# Patient Record
Sex: FEMALE | Race: OTHER | HISPANIC OR LATINO | Employment: OTHER | URBAN - METROPOLITAN AREA
[De-identification: names, ages, dates, MRNs, and addresses within clinical notes are randomized per-mention and may not be internally consistent; named-entity substitution may affect disease eponyms.]

---

## 2017-02-07 ENCOUNTER — HISTORICAL (OUTPATIENT)
Dept: OPHTHALMOLOGY | Facility: CLINIC | Age: 72
End: 2017-02-07

## 2017-02-10 ENCOUNTER — HISTORICAL (OUTPATIENT)
Dept: CARDIOLOGY | Facility: HOSPITAL | Age: 72
End: 2017-02-10

## 2017-03-08 ENCOUNTER — HISTORICAL (OUTPATIENT)
Dept: CARDIOLOGY | Facility: CLINIC | Age: 72
End: 2017-03-08

## 2017-03-28 ENCOUNTER — HISTORICAL (OUTPATIENT)
Dept: LAB | Facility: HOSPITAL | Age: 72
End: 2017-03-28

## 2017-03-29 ENCOUNTER — HOSPITAL ENCOUNTER (OUTPATIENT)
Dept: ADMINISTRATIVE | Facility: HOSPITAL | Age: 72
End: 2017-03-30
Attending: INTERNAL MEDICINE | Admitting: INTERNAL MEDICINE

## 2017-05-02 ENCOUNTER — HISTORICAL (OUTPATIENT)
Dept: ADMINISTRATIVE | Facility: HOSPITAL | Age: 72
End: 2017-05-02

## 2017-05-02 LAB
ABS NEUT (OLG): 5.51 X10(3)/MCL (ref 2.1–9.2)
ALBUMIN SERPL-MCNC: 4 GM/DL (ref 3.4–5)
ALBUMIN/GLOB SERPL: 1 {RATIO}
ALP SERPL-CCNC: 54 UNIT/L (ref 20–120)
ALT SERPL-CCNC: 14 UNIT/L
AST SERPL-CCNC: 19 UNIT/L
BILIRUB SERPL-MCNC: 0.7 MG/DL
BILIRUBIN DIRECT+TOT PNL SERPL-MCNC: <0.1 MG/DL
BILIRUBIN DIRECT+TOT PNL SERPL-MCNC: >0.6 MG/DL
BUN SERPL-MCNC: 19 MG/DL (ref 7–25)
CALCIUM SERPL-MCNC: 9.5 MG/DL (ref 8.4–10.3)
CHLORIDE SERPL-SCNC: 102 MMOL/L (ref 96–110)
CO2 SERPL-SCNC: 26 MMOL/L (ref 24–32)
CREAT SERPL-MCNC: 0.79 MG/DL (ref 0.7–1.1)
ERYTHROCYTE [DISTWIDTH] IN BLOOD BY AUTOMATED COUNT: 13.3 % (ref 11.5–14.5)
EST. AVERAGE GLUCOSE BLD GHB EST-MCNC: 166 MG/DL
GLOBULIN SER-MCNC: 3.3 GM/ML (ref 2.3–3.5)
GLUCOSE SERPL-MCNC: 162 MG/DL (ref 65–99)
HBA1C MFR BLD: 7.4 % (ref 4.7–5.6)
HCT VFR BLD AUTO: 36.9 % (ref 35–46)
HGB BLD-MCNC: 11.5 GM/DL (ref 12–16)
MCH RBC QN AUTO: 28.7 PG (ref 26–34)
MCHC RBC AUTO-ENTMCNC: 31.2 GM/DL (ref 31–37)
MCV RBC AUTO: 92 FL (ref 80–100)
PLATELET # BLD AUTO: 254 X10(3)/MCL (ref 130–400)
PMV BLD AUTO: 10.7 FL (ref 7.4–10.4)
POTASSIUM SERPL-SCNC: 4.2 MMOL/L (ref 3.6–5.2)
PROT SERPL-MCNC: 7.3 GM/DL (ref 6–8)
RBC # BLD AUTO: 4.01 X10(6)/MCL (ref 4–5.2)
SODIUM SERPL-SCNC: 137 MMOL/L (ref 135–146)
T4 FREE SERPL-MCNC: 1.32 NG/DL (ref 0.6–1.15)
TSH SERPL-ACNC: 1.4 MIU/L (ref 0.5–5)
WBC # SPEC AUTO: 9.5 X10(3)/MCL (ref 4.5–11)

## 2017-05-04 ENCOUNTER — HISTORICAL (OUTPATIENT)
Dept: SURGERY | Facility: HOSPITAL | Age: 72
End: 2017-05-04

## 2017-05-04 LAB — POTASSIUM SERPL-SCNC: 4.2 MMOL/L (ref 3.6–5.2)

## 2022-04-10 ENCOUNTER — HISTORICAL (OUTPATIENT)
Dept: ADMINISTRATIVE | Facility: HOSPITAL | Age: 77
End: 2022-04-10

## 2022-04-27 VITALS
WEIGHT: 189.38 LBS | DIASTOLIC BLOOD PRESSURE: 67 MMHG | HEIGHT: 59 IN | BODY MASS INDEX: 38.18 KG/M2 | SYSTOLIC BLOOD PRESSURE: 102 MMHG

## 2022-04-30 NOTE — OP NOTE
Patient:   Gabriela Fishman             MRN: [id number]            FIN: [id number]               Age:   71 years     Sex:  Female     :  1945   Associated Diagnoses:   None   Author:   Akin Calhoun MD      Operative Note   OPERATIVE NOTE:  DATE OF PROCEDURE: 17       PREOPERATIVE DIAGNOSIS: Visually significant cataract of right eye       POSTOPERATIVE DIAGNOSIS: Same       PROCEDURE PERFORMED: Cataract extraction with phacoemulsification and posterior chamber intraocular lens placement right eye       SURGEON: Jeanne Fulton MD       STAFF: Tristan Silverio MD       COMPLICATIONS: None.       BLOOD LOSS: Less than 5 mL.       ANESTHESIA: Monitored anesthesia care.       IMPLANT: [id number] 22.5       PROCEDURE IN DETAIL: After informed consent including the risks, benefits and alternatives, the patient was brought to the Operating Room table and placed in a supine position. Monitored anesthesia care was used throughout the entire procedure without any complications. Once adequate anesthesia was achieved with topical tetracaine, the patient was then prepped and draped in usual sterile fashion for intraocular surgery. A sideport blade was used to make a paracentesis wound. Viscoat was used to fill the anterior chamber. A 2.4 mm keratome blade was then used to make a clear corneal cataract wound. Cystotome was used to make a tear in the anterior capsular flap, which was continued around with Utrata forceps for continuous curvilinear capsulorrhexis. BSS was then used for hydrodissection and hydrodelineation of lens. The lens was noted to spin freely in the bag. Lens was then removed in a divide and conquer manner with the phacoemulsification handpiece. Irrigation and aspiration handpiece was then used to remove the remaining cortical material. Provisc was then used to fill the capsular bag and the lens as mentioned above was placed in the bag without any complications. Irrigation aspiration  handpiece removed the remaining Provisc and the wounds were hydrated with BSS.The eye was noted to have a good physiological pressure. The lid speculum was removed under microscope without any shallowing. Topical Vigamox, Pred Forte, Acular and Maxitrol ointment were placed in the eye. The eye was then covered with a shield. The patient tolerated the procedure well without any complication. The patient will follow-up with ophthalmology the next day.

## 2022-04-30 NOTE — H&P
* Final Report *  History of Present Illness Reports vision is still blurry. Review of Systems Constitutional: no fever, chills or weight loss  ENT: negative  Eyes: Per HPI  Cardio: negative  Pulmonary: negative  Integumentary: intact  Neuro: AAO x 3 Physical Exam   VAcc:  OD: 20/70 PH NI  OS: 20/70 PH 20/60      MRx 1/17/17  OD: +0.75 +0.5 x 180 20/60  OS: +1.00 +0.50 x125 20/50    Tp: 14//15    Pupils: R&R  EOM: full OU  CVF: full OU  Ext: wnl    SLE  L/L: dermatochalasis OU with temporal hooding  C/S: w/q OU  K: clear OU  AC: d/q OU  I: f/r OU  L: 2-3+ NSC, 2+ CC OU      DFE 12/12/16  Vit: clear OU  ONH: p/s OU. CDR 0.4 OU  Mac: flat OU. MAs, central soft exudates OD with temporal focal laser scars. MAs OS with temporal focal  Vsls: attenuated OU  Periph: flat 360 OU. No H/T/D. DBHs/MAs OU      IVFA 11/2016:  OD: staining from focal laser with no late leakage to indicate edema  OS: staining from focal laser with no late leakage to indicate edema      A/P    1. Mod NPDR OU  -encouraged tigth BG control  -Last A1c 6.5 (8/2016)    2. Hx of CSME OU  -s/p Avastin per patient and focal laser OU  -OCT mac (9/29/16): 286/247 w/o CME OD  -IVFA does not reveal any late leakage to indicate edema; pt can proceed to cataract surgery  -DFE/OCT after CE OU    3. NSC/CC OU  - Visually significant OU (OD > OS)  - BCVA 20/60 // 20/50  - Risks/benefits/alternatives of surgery discussed with patient; patient agrees and wishes to proceed  - Able to lay flat on back; no respiratory issues  - Pt is on plavix for cardiac stent placed 4yrs ago  - No h/o Flomax use  - No h/o Fuch's; no guttae on exam  - No h/o trauma  - No phako/iridodonesis on exam  - Cooperative with exam; no claustrophobia; able to do under topical anesthesia  - Trypan blue: may be needed  - Comorbidities: HTN, thyroid, cardiac stent  - Cleared by Cardio  - Surgery date May 4        4. CHASE OU  - Cont aggressive lubrication with ATs and warm compresses   Problem  List/Past Medical History Cataracts, bilateral Coronary artery disease FH: type 2 diabetes HLD (hyperlipidemia) HTN (hypertension) Hx of abnormal cervical Pap smear Morbid obesity(  Probable Diagnosis  ) Stented coronary artery Thyroid disease Type 2 diabetes mellitus Uterine cancer Historical Asthma Procedure/Surgical History Catheter placement in coronary artery(s) for coronary angiography, including intraprocedural injection(s) for coronary angiography, imaging supervision and interpretation; with left heart catheterization including intraprocedural injection(s) for left jania (03/29/2017), Dilation of Coronary Artery, Two Arteries with Three Drug-eluting Intraluminal Devices, Percutaneous Approach (03/29/2017), Dilation of Coronary Artery, Two Arteries, Percutaneous Approach (03/29/2017), Endoluminal imaging of coronary vessel or graft using intravascular ultrasound (IVUS) or optical coherence tomography (OCT) during diagnostic evaluation and/or therapeutic intervention including imaging supervision, interpretation and report; initial vess (03/29/2017), Fluoroscopy of Left Heart using Low Osmolar Contrast (03/29/2017), Fluoroscopy of Multiple Coronary Arteries using Low Osmolar Contrast (03/29/2017), Measurement of Cardiac Sampling and Pressure, Left Heart, Percutaneous Approach (03/29/2017), Percutaneous transcatheter placement of drug eluting intracoronary stent(s), with coronary angioplasty when performed; single major coronary artery or branch (03/29/2017), Percutaneous transcatheter placement of drug eluting intracoronary stent(s), with coronary angioplasty when performed; single major coronary artery or branch (03/29/2017), Ultrasonography of Single Coronary Artery, Intravascular (03/29/2017), Extirpation of Matter from Right External Auditory Canal, Via Natural or Artificial Opening (04/02/2016), Removal impacted cerumen requiring instrumentation, unilateral (04/02/2016), ankle sx, c- section x3, cardiac  stent, Hysterectomy. Medications acetaminophen 500 mg oral tablet, 500 mg, 1 tab(s), Oral, q4hr, PRN atorvastatin 40 mg oral tablet, 40 mg, 1 tab(s), Oral, Daily carvedilol 3.125 mg oral tablet, 3.125 mg, 1 tab(s), Oral, BID clopidogrel 75 mg oral tablet, 75 mg, 1 tab(s), Oral, Daily Flexeril 5 mg oral tablet, 5 mg, 1 tab(s), Oral, BID, Not Taking, Completed Rx gabapentin 300 mg oral capsule, 300 mg, 1 cap(s), Oral, TID glimepiride 2 mg oral tablet, 2 mg, 1 tab(s), Oral, Daily hydrochlorothiazide-lisinopril 25 mg-20 mg oral tablet, 1 tab(s), Oral, Daily isosorbide MONOnitrate 30 mg oral tablet, Extended Release, 30 mg, 1 tab(s), Oral, Daily levothyroxine 88 mcg (0.088 mg) oral tablet, 88 mcg, 1 tab(s), Oral, Daily metFORMIN 1000 mg oral tablet, 1000 mg, 1 tab(s), Oral, BID Mobic 7.5 mg oral tablet, 7.5 mg, 1 tab(s), Oral, q12hr, PRN Vitamin D3 1000 intl units oral capsule, 1000 IntUnit, 1 cap(s), Oral, Daily, Investigating Allergies No Known Medication Allergies Social History   Alcohol   never   Employment/School   Unemployed, Highest education level: High school.   confidential   Exercise   Exercise duration: 0. Self assessment: Fair condition.   Exercise type: never.   Home/Environment   Lives with Alone. Living situation: Home/Independent. Home equipment: Glucose monitoring. Alcohol abuse in household: No. Substance abuse in household: No. Smoker in household: No. Injuries/Abuse/Neglect in household: No. Feels unsafe at home: Yes. Safe place to go: Yes. Agency(s)/Others notified: No. Family/Friends available for support: Yes. Concern for family members at home: No. Major illness in household: No. Financial concerns: No.   Lives with Alone, .   Nutrition/Health   Caffeine intake amount: 2-3 CUPS COFFEE DAILY. Wants to lose weight: Yes. Sleeping concerns: No. Feels highly stressed: No.   Regular   Sexual   Substance Abuse   never   Tobacco   never Family History Congestive heart failure: Sister and  Brother.      Result type: Office/Clinic Note-Physician   Result date: April 27, 2017 10:59 CDT   Result status: Auth (Verified)   Result title: Office Visit Note   Performed by: Jeanne Fulton MD on April 27, 2017 10:59 CDT   Verified by: Jeanne Fulton MD on April 27, 2017 10:59 CDT   Encounter info: [id number], [facility], Clinic Visit, 4/27/2017 - 4/27/2017   Doc has been moved by HIM specialist.

## 2022-04-30 NOTE — H&P
* Final Report *  Update to H&P [facility]     Patient:   Gabriela Fishman             MRN: [id number]            FIN: [id number]               Age:   71 years     Sex:  Female     :  1945   Associated Diagnoses:   None   Author:   Akin Calhoun MD      Health Status   The H&P was reviewed, the patient was examined, and there are no changes to the patient's condition..The H&P was reviewed, the patient was examined, and the following changes to the patient's condition are noted:.  Result type: Progress Note-Physician  Result date: May 04, 2017 8:52 CDT  Result status: Auth (Verified)  Result title: Update to H&P [facility]  Performed by: Akin Calhoun MD on May 04, 2017 8:52 CDT  Verified by: Akin Calhoun MD on May 04, 2017 8:52 CDT  Encounter info: [id number], [facility], Day Surgery, 2017 - 2017    Doc has been moved by HIM specialist.

## 2022-10-31 DIAGNOSIS — M54.40 LUMBAGO WITH SCIATICA: Primary | ICD-10-CM

## 2022-11-03 ENCOUNTER — APPOINTMENT (OUTPATIENT)
Dept: RADIOLOGY | Facility: HOSPITAL | Age: 77
End: 2022-11-03
Attending: INTERNAL MEDICINE
Payer: COMMERCIAL

## 2022-11-03 DIAGNOSIS — M54.40 LUMBAGO WITH SCIATICA: ICD-10-CM

## 2022-11-03 LAB
CREAT SERPL-MCNC: 1.3 MG/DL (ref 0.5–1.4)
SAMPLE: NORMAL

## 2022-11-03 PROCEDURE — A9577 INJ MULTIHANCE: HCPCS | Performed by: INTERNAL MEDICINE

## 2022-11-03 PROCEDURE — 25500020 PHARM REV CODE 255: Performed by: INTERNAL MEDICINE

## 2022-11-03 PROCEDURE — 72158 MRI LUMBAR SPINE W/O & W/DYE: CPT | Mod: TC

## 2022-11-03 RX ADMIN — GADOBENATE DIMEGLUMINE 15 ML: 529 INJECTION, SOLUTION INTRAVENOUS at 08:11

## 2022-12-14 DIAGNOSIS — N13.39 OTHER HYDRONEPHROSIS: Primary | ICD-10-CM

## 2022-12-19 ENCOUNTER — HOSPITAL ENCOUNTER (OUTPATIENT)
Dept: RADIOLOGY | Facility: HOSPITAL | Age: 77
Discharge: HOME | End: 2022-12-19
Attending: INTERNAL MEDICINE
Payer: COMMERCIAL

## 2022-12-19 DIAGNOSIS — N13.39 OTHER HYDRONEPHROSIS: ICD-10-CM

## 2022-12-19 PROCEDURE — 76700 US EXAM ABDOM COMPLETE: CPT | Mod: TC

## 2022-12-20 DIAGNOSIS — M54.40 LUMBAGO WITH SCIATICA: Primary | ICD-10-CM

## 2023-01-19 DIAGNOSIS — R07.9 CHEST PAIN, UNSPECIFIED: Primary | ICD-10-CM

## 2023-01-19 DIAGNOSIS — R94.31 NONSPECIFIC ABNORMAL ELECTROCARDIOGRAM (ECG) (EKG): ICD-10-CM

## 2023-09-08 ENCOUNTER — HOSPITAL ENCOUNTER (EMERGENCY)
Facility: HOSPITAL | Age: 78
Discharge: HOME | End: 2023-09-09
Attending: STUDENT IN AN ORGANIZED HEALTH CARE EDUCATION/TRAINING PROGRAM
Payer: COMMERCIAL

## 2023-09-08 DIAGNOSIS — R52 PAIN: ICD-10-CM

## 2023-09-08 DIAGNOSIS — S93.402A SPRAIN OF LEFT ANKLE, UNSPECIFIED LIGAMENT, INITIAL ENCOUNTER: Primary | ICD-10-CM
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
## 2023-09-08 DIAGNOSIS — M79.672 LEFT FOOT PAIN: ICD-10-CM

## 2023-09-08 PROCEDURE — 99283 EMERGENCY DEPT VISIT LOW MDM: CPT

## 2023-09-09 VITALS
TEMPERATURE: 98 F | HEART RATE: 74 BPM | WEIGHT: 190 LBS | DIASTOLIC BLOOD PRESSURE: 80 MMHG | OXYGEN SATURATION: 99 % | SYSTOLIC BLOOD PRESSURE: 163 MMHG | HEIGHT: 58 IN | RESPIRATION RATE: 13 BRPM | BODY MASS INDEX: 39.88 KG/M2

## 2023-09-09 PROCEDURE — 25000003 PHARM REV CODE 250: Performed by: PHYSICIAN ASSISTANT

## 2023-09-09 RX ORDER — HYDROCODONE BITARTRATE AND ACETAMINOPHEN 10; 325 MG/1; MG/1
1 TABLET ORAL
Status: COMPLETED | OUTPATIENT
Start: 2023-09-09 | End: 2023-09-09

## 2023-09-09 RX ORDER — HYDROCODONE BITARTRATE AND ACETAMINOPHEN 10; 325 MG/1; MG/1
1 TABLET ORAL EVERY 6 HOURS PRN
Qty: 15 TABLET | Refills: 0 | Status: SHIPPED | OUTPATIENT
Start: 2023-09-09

## 2023-09-09 RX ADMIN — HYDROCODONE BITARTRATE AND ACETAMINOPHEN 1 TABLET: 10; 325 TABLET ORAL at 12:09

## 2023-09-09 NOTE — ED PROVIDER NOTES
Encounter Date: 9/8/2023       History     Chief Complaint   Patient presents with    Fall     C/o slip and fall with left ankle pain, did not hit head no loc Malay speaking. Hx of htn dm GCS 15     Ankle Pain     Patient presents with:  Fall: C/o slip and fall with left ankle pain, did not hit head no loc Malay speaking. Hx of htn dm GCS 15   Ankle Pain and left leg pain              Review of patient's allergies indicates:  No Known Allergies  No past medical history on file.  No past surgical history on file.  No family history on file.     Review of Systems   Constitutional:  Negative for fever.   HENT:  Negative for sore throat.    Respiratory:  Negative for shortness of breath.    Cardiovascular:  Negative for chest pain.   Gastrointestinal:  Negative for nausea.   Genitourinary:  Negative for dysuria.   Musculoskeletal:  Negative for back pain.        Left leg pain and left ankle pain.   Skin:  Negative for rash.   Neurological:  Negative for weakness.   Hematological:  Does not bruise/bleed easily.       Physical Exam     Initial Vitals [09/08/23 2045]   BP Pulse Resp Temp SpO2   (!) 163/80 74 18 98.2 °F (36.8 °C) 99 %      MAP       --         Physical Exam    Vitals reviewed.  Constitutional: She appears well-developed.   Cardiovascular:  Normal rate.           Pulmonary/Chest: Breath sounds normal.   Musculoskeletal:      Left ankle: Swelling present. No deformity or lacerations. Tenderness present over the lateral malleolus and proximal fibula. Normal range of motion. Anterior drawer test negative.      Left Achilles Tendon: Normal.      Left foot: Normal range of motion and normal capillary refill. No swelling or laceration. Normal pulse.     Neurological: She is alert and oriented to person, place, and time. She has normal strength. GCS eye subscore is 4. GCS verbal subscore is 5. GCS motor subscore is 6.   Skin: Skin is warm.   Psychiatric: Her behavior is normal. Judgment and thought content  normal.         ED Course   Procedures  Labs Reviewed - No data to display       Imaging Results              X-Ray Foot Complete Left (Final result)  Result time 09/08/23 21:59:43      Final result by Casey Maya MD (09/08/23 21:59:43)                   Impression:      No acute abnormality of the left foot.      Electronically signed by: Casey Maya MD  Date:    09/08/2023  Time:    21:59               Narrative:    EXAMINATION:  Three radiographic views of the LEFT FOOT.    CLINICAL HISTORY:  Pain, unspecified    TECHNIQUE:  Three radiographic views of the LEFT FOOT.    COMPARISON:  None.    FINDINGS:  Three views of the left foot demonstrate normal alignment.  There is no fracture.  There is no bony mass lesion.  There is no radiopaque foreign body.                                       X-Ray Tibia Fibula 2 View Left (Final result)  Result time 09/08/23 21:58:56      Final result by Casey Maya MD (09/08/23 21:58:56)                   Impression:      Diffuse soft tissue swelling without underlying fracture.      Electronically signed by: Casey Maya MD  Date:    09/08/2023  Time:    21:58               Narrative:    EXAMINATION:  Two radiographic views of the LEFT TIBIA FIBULA.    CLINICAL HISTORY:  Unspecified fall, initial encounter    TECHNIQUE:  Two radiographic views of the LEFT TIBIA FIBULA.    COMPARISON:  None.    FINDINGS:  Two views of the left tibia fibula demonstrate normal alignment.  There is no fracture.  There is diffuse soft tissue swelling.                        Final result by Casey Maya MD (09/08/23 21:58:56)                   Impression:      Diffuse soft tissue swelling without underlying fracture.      Electronically signed by: Casey Maya MD  Date:    09/08/2023  Time:    21:58               Narrative:    EXAMINATION:  Two radiographic views of the LEFT TIBIA FIBULA.    CLINICAL HISTORY:  Unspecified fall, initial encounter    TECHNIQUE:  Two radiographic views of the LEFT TIBIA  FIBULA.    COMPARISON:  None.    FINDINGS:  Two views of the left tibia fibula demonstrate normal alignment.  There is no fracture.  There is diffuse soft tissue swelling.                        Final result by Casey Maay MD (09/08/23 21:58:56)                   Impression:      Diffuse soft tissue swelling without underlying fracture.      Electronically signed by: Casey Maya MD  Date:    09/08/2023  Time:    21:58               Narrative:    EXAMINATION:  Two radiographic views of the LEFT TIBIA FIBULA.    CLINICAL HISTORY:  Unspecified fall, initial encounter    TECHNIQUE:  Two radiographic views of the LEFT TIBIA FIBULA.    COMPARISON:  None.    FINDINGS:  Two views of the left tibia fibula demonstrate normal alignment.  There is no fracture.  There is diffuse soft tissue swelling.                                       Medications   HYDROcodone-acetaminophen  mg per tablet 1 tablet (1 tablet Oral Given 9/9/23 0030)     Medical Decision Making  Patient presents with:  Fall: C/o slip and fall with left ankle pain, did not hit head no loc Croatian speaking. Hx of htn dm GCS 15   Ankle Pain        Amount and/or Complexity of Data Reviewed  Radiology:      Details: X-ray of the left tibia fibula demonstrated normal malalignment positive for tissue swelling  X-ray of the left foot no acute findings.    Risk  Prescription drug management.    Judging by the patient's chief complaint and pertinent history, the patient has the following possible differential diagnoses, including but not limited to the following.  Some of these are deemed to be lower likelihood and some more likely based on my physical exam and history combined with possible lab work and/or imaging studies.   Please see the pertinent studies, and refer to the HPI.  Some of these diagnoses will take further evaluation to fully rule out, perhaps as an outpatient and the patient was encouraged to follow up when discharged for more comprehensive  evaluation.    Sprain, strain, contusion, DDD,  foot strain, ankle sprain, ankle sprain, ankle fracture foot fracture     The patient is resting comfortably in no acute distress.  She is hemodynamically stable and is without objective evidence for acute process requiring urgent intervention or hospitalization. I provided counseling to patient with regard to condition, the treatment plan, specific conditions for return, and the importance of follow up. Detailed written and verbal instructions provided to patient and he expressed a verbal understanding of the discharge instructions and overall management plan. Reiterated the importance of medication administration and safety and advised patient to follow up with primary care provider in 3-5 days or sooner if needed.  Answered questions at this time. The patient is stable for discharge.        Clinical Impression:   Final diagnoses:  [R52] Pain  [W19.XXXA] Fall  [S93.402A] Sprain of left ankle, unspecified ligament, initial encounter (Primary)  [M79.672] Left foot pain        ED Disposition Condition    Discharge Stable          ED Prescriptions       Medication Sig Dispense Start Date End Date Auth. Provider    HYDROcodone-acetaminophen (NORCO)  mg per tablet Take 1 tablet by mouth every 6 (six) hours as needed for Pain. 15 tablet 9/9/2023 -- Maeve Aquino PA          Follow-up Information       Follow up With Specialties Details Why Contact Info    [facility] - Emergency Dept Emergency Medicine  If symptoms worsen [address] [id number] [phone]    [facility] - Emergency Dept Emergency Medicine   [address] [phone]  [phone]             Maeve Aquino PA  09/09/23 5708

## 2024-07-01 DIAGNOSIS — M48.061 DEGENERATIVE LUMBAR SPINAL STENOSIS: Primary | ICD-10-CM

## 2024-07-12 ENCOUNTER — HOSPITAL ENCOUNTER (OUTPATIENT)
Dept: RADIOLOGY | Facility: HOSPITAL | Age: 79
Discharge: HOME | End: 2024-07-12
Attending: INTERNAL MEDICINE
Payer: COMMERCIAL

## 2024-07-12 DIAGNOSIS — M48.061 DEGENERATIVE LUMBAR SPINAL STENOSIS: ICD-10-CM

## 2024-07-12 PROCEDURE — 25500020 PHARM REV CODE 255: Performed by: INTERNAL MEDICINE

## 2024-07-12 PROCEDURE — A9577 INJ MULTIHANCE: HCPCS | Performed by: INTERNAL MEDICINE

## 2024-07-12 PROCEDURE — 72158 MRI LUMBAR SPINE W/O & W/DYE: CPT | Mod: TC

## 2024-07-12 RX ADMIN — GADOBENATE DIMEGLUMINE 15 ML: 529 INJECTION, SOLUTION INTRAVENOUS at 05:07

## 2024-11-26 ENCOUNTER — HOSPITAL ENCOUNTER (INPATIENT)
Facility: HOSPITAL | Age: 79
LOS: 9 days | Discharge: NURSING, REHAB OR LONG-TERM CARE | DRG: 481 | End: 2024-12-05
Attending: STUDENT IN AN ORGANIZED HEALTH CARE EDUCATION/TRAINING PROGRAM | Admitting: SURGERY
Payer: COMMERCIAL
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
## 2024-11-26 DIAGNOSIS — S72.322A CLOSED DISPLACED TRANSVERSE FRACTURE OF SHAFT OF LEFT FEMUR, INITIAL ENCOUNTER: Primary | ICD-10-CM

## 2024-11-26 DIAGNOSIS — S72.402A UNSPECIFIED FRACTURE OF LOWER END OF LEFT FEMUR, INITIAL ENCOUNTER FOR CLOSED FRACTURE: ICD-10-CM

## 2024-11-26 LAB
ALBUMIN SERPL-MCNC: 3.4 G/DL (ref 3.4–4.8)
ALBUMIN/GLOB SERPL: 1.3 RATIO (ref 1.1–2)
ALP SERPL-CCNC: 49 UNIT/L (ref 40–150)
ALT SERPL-CCNC: 12 UNIT/L (ref 0–55)
ANION GAP SERPL CALC-SCNC: 8 MEQ/L
AST SERPL-CCNC: 18 UNIT/L (ref 5–34)
BASOPHILS # BLD AUTO: 0.02 X10(3)/MCL
BASOPHILS NFR BLD AUTO: 0.2 %
BILIRUB SERPL-MCNC: 0.2 MG/DL
BUN SERPL-MCNC: 31.1 MG/DL (ref 9.8–20.1)
CALCIUM SERPL-MCNC: 9.5 MG/DL (ref 8.4–10.2)
CHLORIDE SERPL-SCNC: 108 MMOL/L (ref 98–107)
CO2 SERPL-SCNC: 24 MMOL/L (ref 23–31)
CREAT SERPL-MCNC: 1.19 MG/DL (ref 0.55–1.02)
CREAT/UREA NIT SERPL: 26
EOSINOPHIL # BLD AUTO: 0.23 X10(3)/MCL (ref 0–0.9)
EOSINOPHIL NFR BLD AUTO: 2.4 %
ERYTHROCYTE [DISTWIDTH] IN BLOOD BY AUTOMATED COUNT: 14.1 % (ref 11.5–17)
GFR SERPLBLD CREATININE-BSD FMLA CKD-EPI: 46 ML/MIN/1.73/M2
GLOBULIN SER-MCNC: 2.6 GM/DL (ref 2.4–3.5)
GLUCOSE SERPL-MCNC: 210 MG/DL (ref 82–115)
GROUP & RH: NORMAL
HCT VFR BLD AUTO: 30.8 % (ref 37–47)
HGB BLD-MCNC: 9.7 G/DL (ref 12–16)
IMM GRANULOCYTES # BLD AUTO: 0.05 X10(3)/MCL (ref 0–0.04)
IMM GRANULOCYTES NFR BLD AUTO: 0.5 %
INDIRECT COOMBS: NORMAL
LYMPHOCYTES # BLD AUTO: 1.72 X10(3)/MCL (ref 0.6–4.6)
LYMPHOCYTES NFR BLD AUTO: 17.9 %
MCH RBC QN AUTO: 29.1 PG (ref 27–31)
MCHC RBC AUTO-ENTMCNC: 31.5 G/DL (ref 33–36)
MCV RBC AUTO: 92.5 FL (ref 80–94)
MONOCYTES # BLD AUTO: 0.64 X10(3)/MCL (ref 0.1–1.3)
MONOCYTES NFR BLD AUTO: 6.7 %
NEUTROPHILS # BLD AUTO: 6.95 X10(3)/MCL (ref 2.1–9.2)
NEUTROPHILS NFR BLD AUTO: 72.3 %
NRBC BLD AUTO-RTO: 0 %
PLATELET # BLD AUTO: 207 X10(3)/MCL (ref 130–400)
PMV BLD AUTO: 9.5 FL (ref 7.4–10.4)
POTASSIUM SERPL-SCNC: 4.9 MMOL/L (ref 3.5–5.1)
PROT SERPL-MCNC: 6 GM/DL (ref 5.8–7.6)
RBC # BLD AUTO: 3.33 X10(6)/MCL (ref 4.2–5.4)
SODIUM SERPL-SCNC: 140 MMOL/L (ref 136–145)
SPECIMEN OUTDATE: NORMAL
WBC # BLD AUTO: 9.61 X10(3)/MCL (ref 4.5–11.5)

## 2024-11-26 PROCEDURE — 99223 1ST HOSP IP/OBS HIGH 75: CPT | Mod: GC,,, | Performed by: SURGERY

## 2024-11-26 PROCEDURE — 85025 COMPLETE CBC W/AUTO DIFF WBC: CPT | Performed by: STUDENT IN AN ORGANIZED HEALTH CARE EDUCATION/TRAINING PROGRAM

## 2024-11-26 PROCEDURE — 63600175 PHARM REV CODE 636 W HCPCS: Performed by: STUDENT IN AN ORGANIZED HEALTH CARE EDUCATION/TRAINING PROGRAM

## 2024-11-26 PROCEDURE — 11000001 HC ACUTE MED/SURG PRIVATE ROOM

## 2024-11-26 PROCEDURE — 80053 COMPREHEN METABOLIC PANEL: CPT | Performed by: STUDENT IN AN ORGANIZED HEALTH CARE EDUCATION/TRAINING PROGRAM

## 2024-11-26 PROCEDURE — 86900 BLOOD TYPING SEROLOGIC ABO: CPT | Performed by: STUDENT IN AN ORGANIZED HEALTH CARE EDUCATION/TRAINING PROGRAM

## 2024-11-26 RX ORDER — ENOXAPARIN SODIUM 100 MG/ML
40 INJECTION SUBCUTANEOUS EVERY 12 HOURS
Status: DISCONTINUED | OUTPATIENT
Start: 2024-11-27 | End: 2024-12-05 | Stop reason: HOSPADM

## 2024-11-26 RX ORDER — ACETAMINOPHEN 325 MG/1
650 TABLET ORAL EVERY 4 HOURS
Status: DISPENSED | OUTPATIENT
Start: 2024-11-26 | End: 2024-12-01

## 2024-11-26 RX ORDER — SODIUM CHLORIDE, SODIUM LACTATE, POTASSIUM CHLORIDE, CALCIUM CHLORIDE 600; 310; 30; 20 MG/100ML; MG/100ML; MG/100ML; MG/100ML
INJECTION, SOLUTION INTRAVENOUS CONTINUOUS
Status: DISCONTINUED | OUTPATIENT
Start: 2024-11-27 | End: 2024-11-27

## 2024-11-26 RX ORDER — TALC
6 POWDER (GRAM) TOPICAL NIGHTLY PRN
Status: DISCONTINUED | OUTPATIENT
Start: 2024-11-26 | End: 2024-12-05 | Stop reason: HOSPADM

## 2024-11-26 RX ORDER — FENTANYL CITRATE 50 UG/ML
50 INJECTION, SOLUTION INTRAMUSCULAR; INTRAVENOUS
Status: COMPLETED | OUTPATIENT
Start: 2024-11-26 | End: 2024-11-26

## 2024-11-26 RX ORDER — ADHESIVE BANDAGE
30 BANDAGE TOPICAL DAILY PRN
Status: DISCONTINUED | OUTPATIENT
Start: 2024-11-26 | End: 2024-12-05 | Stop reason: HOSPADM

## 2024-11-26 RX ORDER — INSULIN ASPART 100 [IU]/ML
0-5 INJECTION, SOLUTION INTRAVENOUS; SUBCUTANEOUS EVERY 6 HOURS PRN
Status: DISCONTINUED | OUTPATIENT
Start: 2024-11-26 | End: 2024-11-30

## 2024-11-26 RX ORDER — POLYETHYLENE GLYCOL 3350 17 G/17G
17 POWDER, FOR SOLUTION ORAL 2 TIMES DAILY
Status: DISCONTINUED | OUTPATIENT
Start: 2024-11-27 | End: 2024-12-05 | Stop reason: HOSPADM

## 2024-11-26 RX ORDER — OXYCODONE HYDROCHLORIDE 10 MG/1
10 TABLET ORAL EVERY 4 HOURS PRN
Status: DISCONTINUED | OUTPATIENT
Start: 2024-11-26 | End: 2024-12-04

## 2024-11-26 RX ORDER — METHOCARBAMOL 500 MG/1
500 TABLET, FILM COATED ORAL EVERY 8 HOURS
Status: DISCONTINUED | OUTPATIENT
Start: 2024-11-26 | End: 2024-12-05 | Stop reason: HOSPADM

## 2024-11-26 RX ORDER — GLUCAGON 1 MG
1 KIT INJECTION
Status: DISCONTINUED | OUTPATIENT
Start: 2024-11-26 | End: 2024-11-30

## 2024-11-26 RX ORDER — ONDANSETRON HYDROCHLORIDE 2 MG/ML
4 INJECTION, SOLUTION INTRAVENOUS
Status: COMPLETED | OUTPATIENT
Start: 2024-11-26 | End: 2024-11-26

## 2024-11-26 RX ORDER — GABAPENTIN 300 MG/1
300 CAPSULE ORAL 3 TIMES DAILY
Status: DISCONTINUED | OUTPATIENT
Start: 2024-11-27 | End: 2024-12-05 | Stop reason: HOSPADM

## 2024-11-26 RX ORDER — OXYCODONE HYDROCHLORIDE 5 MG/1
5 TABLET ORAL EVERY 4 HOURS PRN
Status: DISCONTINUED | OUTPATIENT
Start: 2024-11-26 | End: 2024-12-05 | Stop reason: HOSPADM

## 2024-11-26 RX ORDER — DOCUSATE SODIUM 100 MG/1
100 CAPSULE, LIQUID FILLED ORAL 2 TIMES DAILY
Status: DISCONTINUED | OUTPATIENT
Start: 2024-11-26 | End: 2024-12-05 | Stop reason: HOSPADM

## 2024-11-26 RX ADMIN — FENTANYL CITRATE 50 MCG: 50 INJECTION, SOLUTION INTRAMUSCULAR; INTRAVENOUS at 09:11

## 2024-11-26 RX ADMIN — ONDANSETRON 4 MG: 2 INJECTION INTRAMUSCULAR; INTRAVENOUS at 09:11

## 2024-11-27 ENCOUNTER — ANESTHESIA (OUTPATIENT)
Dept: SURGERY | Facility: HOSPITAL | Age: 79
DRG: 481 | End: 2024-11-27
Payer: COMMERCIAL

## 2024-11-27 ENCOUNTER — ANESTHESIA EVENT (OUTPATIENT)
Dept: SURGERY | Facility: HOSPITAL | Age: 79
DRG: 481 | End: 2024-11-27
Payer: COMMERCIAL

## 2024-11-27 LAB
ABORH RETYPE: NORMAL
ALBUMIN SERPL-MCNC: 3 G/DL (ref 3.4–4.8)
ALBUMIN/GLOB SERPL: 1.4 RATIO (ref 1.1–2)
ALP SERPL-CCNC: 36 UNIT/L (ref 40–150)
ALT SERPL-CCNC: 11 UNIT/L (ref 0–55)
ANION GAP SERPL CALC-SCNC: 8 MEQ/L
AST SERPL-CCNC: 14 UNIT/L (ref 5–34)
BASOPHILS # BLD AUTO: 0.02 X10(3)/MCL
BASOPHILS NFR BLD AUTO: 0.2 %
BILIRUB SERPL-MCNC: 0.3 MG/DL
BUN SERPL-MCNC: 37.4 MG/DL (ref 9.8–20.1)
CALCIUM SERPL-MCNC: 8.7 MG/DL (ref 8.4–10.2)
CHLORIDE SERPL-SCNC: 108 MMOL/L (ref 98–107)
CO2 SERPL-SCNC: 22 MMOL/L (ref 23–31)
CREAT SERPL-MCNC: 1.15 MG/DL (ref 0.55–1.02)
CREAT/UREA NIT SERPL: 33
EOSINOPHIL # BLD AUTO: 0.18 X10(3)/MCL (ref 0–0.9)
EOSINOPHIL NFR BLD AUTO: 2.1 %
ERYTHROCYTE [DISTWIDTH] IN BLOOD BY AUTOMATED COUNT: 14.2 % (ref 11.5–17)
GFR SERPLBLD CREATININE-BSD FMLA CKD-EPI: 48 ML/MIN/1.73/M2
GLOBULIN SER-MCNC: 2.1 GM/DL (ref 2.4–3.5)
GLUCOSE SERPL-MCNC: 105 MG/DL (ref 82–115)
HCT VFR BLD AUTO: 27 % (ref 37–47)
HGB BLD-MCNC: 8.4 G/DL (ref 12–16)
IMM GRANULOCYTES # BLD AUTO: 0.02 X10(3)/MCL (ref 0–0.04)
IMM GRANULOCYTES NFR BLD AUTO: 0.2 %
LACTATE SERPL-SCNC: 1 MMOL/L (ref 0.5–2.2)
LYMPHOCYTES # BLD AUTO: 2.45 X10(3)/MCL (ref 0.6–4.6)
LYMPHOCYTES NFR BLD AUTO: 28.9 %
MAGNESIUM SERPL-MCNC: 0.9 MG/DL (ref 1.6–2.6)
MCH RBC QN AUTO: 29.3 PG (ref 27–31)
MCHC RBC AUTO-ENTMCNC: 31.1 G/DL (ref 33–36)
MCV RBC AUTO: 94.1 FL (ref 80–94)
MONOCYTES # BLD AUTO: 0.8 X10(3)/MCL (ref 0.1–1.3)
MONOCYTES NFR BLD AUTO: 9.4 %
NEUTROPHILS # BLD AUTO: 5 X10(3)/MCL (ref 2.1–9.2)
NEUTROPHILS NFR BLD AUTO: 59.2 %
NRBC BLD AUTO-RTO: 0 %
PHOSPHATE SERPL-MCNC: 3.1 MG/DL (ref 2.3–4.7)
PLATELET # BLD AUTO: 192 X10(3)/MCL (ref 130–400)
PMV BLD AUTO: 10.2 FL (ref 7.4–10.4)
POCT GLUCOSE: 156 MG/DL (ref 70–110)
POCT GLUCOSE: 77 MG/DL (ref 70–110)
POTASSIUM SERPL-SCNC: 4.9 MMOL/L (ref 3.5–5.1)
PROT SERPL-MCNC: 5.1 GM/DL (ref 5.8–7.6)
RBC # BLD AUTO: 2.87 X10(6)/MCL (ref 4.2–5.4)
SODIUM SERPL-SCNC: 138 MMOL/L (ref 136–145)
WBC # BLD AUTO: 8.47 X10(3)/MCL (ref 4.5–11.5)

## 2024-11-27 PROCEDURE — 36000710: Performed by: ORTHOPAEDIC SURGERY

## 2024-11-27 PROCEDURE — 99233 SBSQ HOSP IP/OBS HIGH 50: CPT | Mod: ,,, | Performed by: SURGERY

## 2024-11-27 PROCEDURE — 37000008 HC ANESTHESIA 1ST 15 MINUTES: Performed by: ORTHOPAEDIC SURGERY

## 2024-11-27 PROCEDURE — 0QS904Z REPOSITION LEFT FEMORAL SHAFT WITH INTERNAL FIXATION DEVICE, OPEN APPROACH: ICD-10-PCS | Performed by: ORTHOPAEDIC SURGERY

## 2024-11-27 PROCEDURE — 36415 COLL VENOUS BLD VENIPUNCTURE: CPT | Performed by: STUDENT IN AN ORGANIZED HEALTH CARE EDUCATION/TRAINING PROGRAM

## 2024-11-27 PROCEDURE — 25000003 PHARM REV CODE 250: Performed by: NURSE PRACTITIONER

## 2024-11-27 PROCEDURE — C1713 ANCHOR/SCREW BN/BN,TIS/BN: HCPCS | Performed by: ORTHOPAEDIC SURGERY

## 2024-11-27 PROCEDURE — 63600175 PHARM REV CODE 636 W HCPCS: Performed by: ANESTHESIOLOGY

## 2024-11-27 PROCEDURE — 27000221 HC OXYGEN, UP TO 24 HOURS

## 2024-11-27 PROCEDURE — 63600175 PHARM REV CODE 636 W HCPCS

## 2024-11-27 PROCEDURE — 27511 TREATMENT OF THIGH FRACTURE: CPT | Mod: LT,,, | Performed by: ORTHOPAEDIC SURGERY

## 2024-11-27 PROCEDURE — 63600175 PHARM REV CODE 636 W HCPCS: Performed by: NURSE PRACTITIONER

## 2024-11-27 PROCEDURE — D9220A PRA ANESTHESIA: Mod: ANES,,, | Performed by: ANESTHESIOLOGY

## 2024-11-27 PROCEDURE — 27201423 OPTIME MED/SURG SUP & DEVICES STERILE SUPPLY: Performed by: ORTHOPAEDIC SURGERY

## 2024-11-27 PROCEDURE — 84100 ASSAY OF PHOSPHORUS: CPT | Performed by: NURSE PRACTITIONER

## 2024-11-27 PROCEDURE — 80053 COMPREHEN METABOLIC PANEL: CPT | Performed by: NURSE PRACTITIONER

## 2024-11-27 PROCEDURE — 71000033 HC RECOVERY, INTIAL HOUR: Performed by: ORTHOPAEDIC SURGERY

## 2024-11-27 PROCEDURE — 27511 TREATMENT OF THIGH FRACTURE: CPT | Mod: AS,LT,,

## 2024-11-27 PROCEDURE — 25000003 PHARM REV CODE 250

## 2024-11-27 PROCEDURE — 99900035 HC TECH TIME PER 15 MIN (STAT)

## 2024-11-27 PROCEDURE — P9047 ALBUMIN (HUMAN), 25%, 50ML: HCPCS | Mod: JZ,JG

## 2024-11-27 PROCEDURE — 83735 ASSAY OF MAGNESIUM: CPT | Performed by: NURSE PRACTITIONER

## 2024-11-27 PROCEDURE — 85025 COMPLETE CBC W/AUTO DIFF WBC: CPT | Performed by: NURSE PRACTITIONER

## 2024-11-27 PROCEDURE — 83605 ASSAY OF LACTIC ACID: CPT | Performed by: NURSE PRACTITIONER

## 2024-11-27 PROCEDURE — 63600175 PHARM REV CODE 636 W HCPCS: Mod: JZ,JG

## 2024-11-27 PROCEDURE — C1769 GUIDE WIRE: HCPCS | Performed by: ORTHOPAEDIC SURGERY

## 2024-11-27 PROCEDURE — 99223 1ST HOSP IP/OBS HIGH 75: CPT | Mod: 57,,, | Performed by: ORTHOPAEDIC SURGERY

## 2024-11-27 PROCEDURE — 37000009 HC ANESTHESIA EA ADD 15 MINS: Performed by: ORTHOPAEDIC SURGERY

## 2024-11-27 PROCEDURE — 36415 COLL VENOUS BLD VENIPUNCTURE: CPT | Performed by: NURSE PRACTITIONER

## 2024-11-27 PROCEDURE — 63600175 PHARM REV CODE 636 W HCPCS: Performed by: ORTHOPAEDIC SURGERY

## 2024-11-27 PROCEDURE — D9220A PRA ANESTHESIA: Mod: CRNA,,,

## 2024-11-27 PROCEDURE — 11000001 HC ACUTE MED/SURG PRIVATE ROOM

## 2024-11-27 PROCEDURE — 94799 UNLISTED PULMONARY SVC/PX: CPT

## 2024-11-27 PROCEDURE — 36000711: Performed by: ORTHOPAEDIC SURGERY

## 2024-11-27 DEVICE — SCREW LOK XL25 5X32MM: Type: IMPLANTABLE DEVICE | Site: FEMUR | Status: FUNCTIONAL

## 2024-11-27 DEVICE — IMPLANTABLE DEVICE: Type: IMPLANTABLE DEVICE | Site: FEMUR | Status: FUNCTIONAL

## 2024-11-27 DEVICE — SCREW BONE VA LK 3.5X75MM: Type: IMPLANTABLE DEVICE | Site: FEMUR | Status: FUNCTIONAL

## 2024-11-27 DEVICE — SCREW STRDRV VA LOK 3.5X70MM: Type: IMPLANTABLE DEVICE | Site: FEMUR | Status: FUNCTIONAL

## 2024-11-27 DEVICE — SCREW LOK XL25 5X52MM: Type: IMPLANTABLE DEVICE | Site: FEMUR | Status: FUNCTIONAL

## 2024-11-27 DEVICE — SCREW BONE VA ST 3.5X60MM: Type: IMPLANTABLE DEVICE | Site: FEMUR | Status: FUNCTIONAL

## 2024-11-27 DEVICE — SCREW BONE LOCK IM NAIL 34X5MM: Type: IMPLANTABLE DEVICE | Site: FEMUR | Status: FUNCTIONAL

## 2024-11-27 RX ORDER — LIDOCAINE HYDROCHLORIDE 20 MG/ML
INJECTION INTRAVENOUS
Status: DISCONTINUED | OUTPATIENT
Start: 2024-11-27 | End: 2024-11-27

## 2024-11-27 RX ORDER — GABAPENTIN 600 MG/1
600 TABLET ORAL 2 TIMES DAILY
COMMUNITY
Start: 2024-07-10

## 2024-11-27 RX ORDER — SODIUM CHLORIDE, SODIUM LACTATE, POTASSIUM CHLORIDE, CALCIUM CHLORIDE 600; 310; 30; 20 MG/100ML; MG/100ML; MG/100ML; MG/100ML
INJECTION, SOLUTION INTRAVENOUS CONTINUOUS
Status: DISCONTINUED | OUTPATIENT
Start: 2024-11-27 | End: 2024-11-27

## 2024-11-27 RX ORDER — PROCHLORPERAZINE EDISYLATE 5 MG/ML
5 INJECTION INTRAMUSCULAR; INTRAVENOUS EVERY 30 MIN PRN
Status: DISCONTINUED | OUTPATIENT
Start: 2024-11-27 | End: 2024-11-28

## 2024-11-27 RX ORDER — INSULIN GLARGINE 100 [IU]/ML
30 INJECTION, SOLUTION SUBCUTANEOUS DAILY
Status: ON HOLD | COMMUNITY
End: 2024-12-05 | Stop reason: HOSPADM

## 2024-11-27 RX ORDER — ATORVASTATIN CALCIUM 40 MG/1
40 TABLET, FILM COATED ORAL DAILY
COMMUNITY

## 2024-11-27 RX ORDER — CARVEDILOL 3.12 MG/1
6.25 TABLET ORAL 2 TIMES DAILY
COMMUNITY
Start: 2024-07-15

## 2024-11-27 RX ORDER — DEXAMETHASONE SODIUM PHOSPHATE 4 MG/ML
INJECTION, SOLUTION INTRA-ARTICULAR; INTRALESIONAL; INTRAMUSCULAR; INTRAVENOUS; SOFT TISSUE
Status: DISCONTINUED | OUTPATIENT
Start: 2024-11-27 | End: 2024-11-27

## 2024-11-27 RX ORDER — METFORMIN HYDROCHLORIDE 1000 MG/1
1000 TABLET ORAL 2 TIMES DAILY
COMMUNITY

## 2024-11-27 RX ORDER — VANCOMYCIN HYDROCHLORIDE 1 G/20ML
INJECTION, POWDER, LYOPHILIZED, FOR SOLUTION INTRAVENOUS
Status: DISCONTINUED | OUTPATIENT
Start: 2024-11-27 | End: 2024-11-27 | Stop reason: HOSPADM

## 2024-11-27 RX ORDER — CALCIUM CHLORIDE INJECTION 100 MG/ML
INJECTION, SOLUTION INTRAVENOUS
Status: DISCONTINUED | OUTPATIENT
Start: 2024-11-27 | End: 2024-11-27

## 2024-11-27 RX ORDER — ONDANSETRON HYDROCHLORIDE 2 MG/ML
INJECTION, SOLUTION INTRAVENOUS
Status: DISCONTINUED | OUTPATIENT
Start: 2024-11-27 | End: 2024-11-27

## 2024-11-27 RX ORDER — PHENYLEPHRINE HYDROCHLORIDE 10 MG/ML
INJECTION INTRAVENOUS
Status: DISCONTINUED | OUTPATIENT
Start: 2024-11-27 | End: 2024-11-27

## 2024-11-27 RX ORDER — HYDROCODONE BITARTRATE AND ACETAMINOPHEN 5; 325 MG/1; MG/1
1 TABLET ORAL
OUTPATIENT
Start: 2024-11-27

## 2024-11-27 RX ORDER — TRAMADOL HYDROCHLORIDE 50 MG/1
50 TABLET ORAL DAILY PRN
Status: ON HOLD | COMMUNITY
End: 2024-12-05 | Stop reason: HOSPADM

## 2024-11-27 RX ORDER — LISINOPRIL 20 MG/1
20 TABLET ORAL DAILY
COMMUNITY

## 2024-11-27 RX ORDER — CEFAZOLIN SODIUM 1 G/3ML
INJECTION, POWDER, FOR SOLUTION INTRAMUSCULAR; INTRAVENOUS
Status: DISCONTINUED | OUTPATIENT
Start: 2024-11-27 | End: 2024-11-27

## 2024-11-27 RX ORDER — ROCURONIUM BROMIDE 10 MG/ML
INJECTION, SOLUTION INTRAVENOUS
Status: DISCONTINUED | OUTPATIENT
Start: 2024-11-27 | End: 2024-11-27

## 2024-11-27 RX ORDER — SODIUM CHLORIDE 9 MG/ML
INJECTION, SOLUTION INTRAVENOUS CONTINUOUS PRN
Status: DISCONTINUED | OUTPATIENT
Start: 2024-11-27 | End: 2024-11-27

## 2024-11-27 RX ORDER — ISOSORBIDE MONONITRATE 30 MG/1
30 TABLET, EXTENDED RELEASE ORAL DAILY
COMMUNITY
Start: 2024-06-11

## 2024-11-27 RX ORDER — OXYCODONE HYDROCHLORIDE 5 MG/1
5 TABLET ORAL EVERY 4 HOURS PRN
OUTPATIENT
Start: 2024-11-27

## 2024-11-27 RX ORDER — MORPHINE SULFATE 4 MG/ML
2 INJECTION, SOLUTION INTRAMUSCULAR; INTRAVENOUS EVERY 4 HOURS PRN
Status: DISCONTINUED | OUTPATIENT
Start: 2024-11-27 | End: 2024-12-03

## 2024-11-27 RX ORDER — MEPERIDINE HYDROCHLORIDE 25 MG/ML
6.25 INJECTION INTRAMUSCULAR; INTRAVENOUS; SUBCUTANEOUS ONCE AS NEEDED
Status: DISCONTINUED | OUTPATIENT
Start: 2024-11-27 | End: 2024-11-28

## 2024-11-27 RX ORDER — FENTANYL CITRATE 50 UG/ML
INJECTION, SOLUTION INTRAMUSCULAR; INTRAVENOUS
Status: DISCONTINUED | OUTPATIENT
Start: 2024-11-27 | End: 2024-11-27

## 2024-11-27 RX ORDER — METHOCARBAMOL 100 MG/ML
1000 INJECTION, SOLUTION INTRAMUSCULAR; INTRAVENOUS ONCE AS NEEDED
Status: ACTIVE | OUTPATIENT
Start: 2024-11-27 | End: 2024-11-27

## 2024-11-27 RX ORDER — HALOPERIDOL 5 MG/ML
0.5 INJECTION INTRAMUSCULAR EVERY 10 MIN PRN
Status: DISCONTINUED | OUTPATIENT
Start: 2024-11-27 | End: 2024-11-28

## 2024-11-27 RX ORDER — SODIUM CHLORIDE, SODIUM GLUCONATE, SODIUM ACETATE, POTASSIUM CHLORIDE AND MAGNESIUM CHLORIDE 30; 37; 368; 526; 502 MG/100ML; MG/100ML; MG/100ML; MG/100ML; MG/100ML
INJECTION, SOLUTION INTRAVENOUS CONTINUOUS
Status: DISCONTINUED | OUTPATIENT
Start: 2024-11-27 | End: 2024-11-27

## 2024-11-27 RX ORDER — CEFAZOLIN 2 G/1
2 INJECTION, POWDER, FOR SOLUTION INTRAMUSCULAR; INTRAVENOUS
Status: COMPLETED | OUTPATIENT
Start: 2024-11-27 | End: 2024-11-28

## 2024-11-27 RX ORDER — MAGNESIUM SULFATE HEPTAHYDRATE 40 MG/ML
2 INJECTION, SOLUTION INTRAVENOUS 2 TIMES DAILY
Status: DISCONTINUED | OUTPATIENT
Start: 2024-11-27 | End: 2024-11-27

## 2024-11-27 RX ORDER — CLOPIDOGREL BISULFATE 75 MG/1
75 TABLET ORAL DAILY
COMMUNITY

## 2024-11-27 RX ORDER — PROPOFOL 10 MG/ML
VIAL (ML) INTRAVENOUS
Status: DISCONTINUED | OUTPATIENT
Start: 2024-11-27 | End: 2024-11-27

## 2024-11-27 RX ORDER — ALBUMIN HUMAN 250 G/1000ML
SOLUTION INTRAVENOUS
Status: DISCONTINUED | OUTPATIENT
Start: 2024-11-27 | End: 2024-11-27

## 2024-11-27 RX ORDER — HYDROMORPHONE HYDROCHLORIDE 2 MG/ML
0.4 INJECTION, SOLUTION INTRAMUSCULAR; INTRAVENOUS; SUBCUTANEOUS EVERY 5 MIN PRN
Status: DISCONTINUED | OUTPATIENT
Start: 2024-11-27 | End: 2024-11-28

## 2024-11-27 RX ORDER — MIDAZOLAM HYDROCHLORIDE 2 MG/2ML
2 INJECTION, SOLUTION INTRAMUSCULAR; INTRAVENOUS ONCE AS NEEDED
Status: DISCONTINUED | OUTPATIENT
Start: 2024-11-27 | End: 2024-11-28

## 2024-11-27 RX ORDER — MIDAZOLAM HYDROCHLORIDE 1 MG/ML
INJECTION INTRAMUSCULAR; INTRAVENOUS
Status: DISCONTINUED | OUTPATIENT
Start: 2024-11-27 | End: 2024-11-27

## 2024-11-27 RX ORDER — MAGNESIUM SULFATE HEPTAHYDRATE 40 MG/ML
2 INJECTION, SOLUTION INTRAVENOUS ONCE
Status: COMPLETED | OUTPATIENT
Start: 2024-11-27 | End: 2024-11-27

## 2024-11-27 RX ADMIN — PHENYLEPHRINE HYDROCHLORIDE 100 MCG: 10 INJECTION INTRAVENOUS at 11:11

## 2024-11-27 RX ADMIN — CEFAZOLIN 2 G: 2 INJECTION, POWDER, FOR SOLUTION INTRAMUSCULAR; INTRAVENOUS at 08:11

## 2024-11-27 RX ADMIN — FENTANYL CITRATE 50 MCG: 50 INJECTION, SOLUTION INTRAMUSCULAR; INTRAVENOUS at 10:11

## 2024-11-27 RX ADMIN — METHOCARBAMOL 500 MG: 500 TABLET ORAL at 12:11

## 2024-11-27 RX ADMIN — DOCUSATE SODIUM 100 MG: 100 CAPSULE, LIQUID FILLED ORAL at 08:11

## 2024-11-27 RX ADMIN — DEXAMETHASONE SODIUM PHOSPHATE 4 MG: 4 INJECTION, SOLUTION INTRA-ARTICULAR; INTRALESIONAL; INTRAMUSCULAR; INTRAVENOUS; SOFT TISSUE at 10:11

## 2024-11-27 RX ADMIN — ACETAMINOPHEN 650 MG: 325 TABLET, FILM COATED ORAL at 08:11

## 2024-11-27 RX ADMIN — ROCURONIUM BROMIDE 70 MG: 10 SOLUTION INTRAVENOUS at 10:11

## 2024-11-27 RX ADMIN — ENOXAPARIN SODIUM 40 MG: 40 INJECTION SUBCUTANEOUS at 08:11

## 2024-11-27 RX ADMIN — ALBUMIN (HUMAN) 100 ML: 12.5 SOLUTION INTRAVENOUS at 10:11

## 2024-11-27 RX ADMIN — PROPOFOL 80 MG: 10 INJECTION, EMULSION INTRAVENOUS at 10:11

## 2024-11-27 RX ADMIN — GABAPENTIN 300 MG: 300 CAPSULE ORAL at 08:11

## 2024-11-27 RX ADMIN — ACETAMINOPHEN 650 MG: 325 TABLET, FILM COATED ORAL at 02:11

## 2024-11-27 RX ADMIN — METHOCARBAMOL 500 MG: 500 TABLET ORAL at 06:11

## 2024-11-27 RX ADMIN — CALCIUM CHLORIDE INJECTION 0.5 G: 100 INJECTION, SOLUTION INTRAVENOUS at 11:11

## 2024-11-27 RX ADMIN — ACETAMINOPHEN 650 MG: 325 TABLET, FILM COATED ORAL at 06:11

## 2024-11-27 RX ADMIN — ONDANSETRON 4 MG: 2 INJECTION INTRAMUSCULAR; INTRAVENOUS at 11:11

## 2024-11-27 RX ADMIN — MAGNESIUM SULFATE HEPTAHYDRATE 2 G: 40 INJECTION, SOLUTION INTRAVENOUS at 08:11

## 2024-11-27 RX ADMIN — POLYETHYLENE GLYCOL 3350 17 G: 17 POWDER, FOR SOLUTION ORAL at 08:11

## 2024-11-27 RX ADMIN — MIDAZOLAM HYDROCHLORIDE 1 MG: 1 INJECTION, SOLUTION INTRAMUSCULAR; INTRAVENOUS at 09:11

## 2024-11-27 RX ADMIN — SODIUM CHLORIDE: 9 INJECTION, SOLUTION INTRAVENOUS at 09:11

## 2024-11-27 RX ADMIN — CEFAZOLIN 2 G: 2 INJECTION, POWDER, FOR SOLUTION INTRAMUSCULAR; INTRAVENOUS at 02:11

## 2024-11-27 RX ADMIN — LIDOCAINE HYDROCHLORIDE 80 MG: 20 INJECTION INTRAVENOUS at 10:11

## 2024-11-27 RX ADMIN — ACETAMINOPHEN 650 MG: 325 TABLET, FILM COATED ORAL at 12:11

## 2024-11-27 RX ADMIN — MAGNESIUM SULFATE HEPTAHYDRATE 2 G: 40 INJECTION, SOLUTION INTRAVENOUS at 02:11

## 2024-11-27 RX ADMIN — HYDROMORPHONE HYDROCHLORIDE 0.4 MG: 2 INJECTION INTRAMUSCULAR; INTRAVENOUS; SUBCUTANEOUS at 12:11

## 2024-11-27 RX ADMIN — OXYCODONE HYDROCHLORIDE 5 MG: 5 TABLET ORAL at 08:11

## 2024-11-27 RX ADMIN — GABAPENTIN 300 MG: 300 CAPSULE ORAL at 02:11

## 2024-11-27 RX ADMIN — CEFAZOLIN 2 G: 330 INJECTION, POWDER, FOR SOLUTION INTRAMUSCULAR; INTRAVENOUS at 10:11

## 2024-11-27 RX ADMIN — OXYCODONE HYDROCHLORIDE 5 MG: 5 TABLET ORAL at 02:11

## 2024-11-27 RX ADMIN — SODIUM CHLORIDE, POTASSIUM CHLORIDE, SODIUM LACTATE AND CALCIUM CHLORIDE: 600; 310; 30; 20 INJECTION, SOLUTION INTRAVENOUS at 12:11

## 2024-11-27 RX ADMIN — PHENYLEPHRINE HYDROCHLORIDE 100 MCG: 10 INJECTION INTRAVENOUS at 10:11

## 2024-11-27 RX ADMIN — PHENYLEPHRINE HYDROCHLORIDE 200 MCG: 10 INJECTION INTRAVENOUS at 10:11

## 2024-11-27 RX ADMIN — METHOCARBAMOL 500 MG: 500 TABLET ORAL at 02:11

## 2024-11-27 NOTE — PLAN OF CARE
11/27/24 1413   Discharge Assessment   Assessment Type Discharge Planning Assessment   Confirmed/corrected address, phone number and insurance Yes   Confirmed Demographics Correct on Facesheet   Source of Information patient;family   Communicated HUSSAIN with patient/caregiver Date not available/Unable to determine   Reason For Admission fall   People in Home alone   Do you expect to return to your current living situation? Yes   Do you have help at home or someone to help you manage your care at home? Yes   Who are your caregiver(s) and their phone number(s)? Martin Jacob [phone]   Prior to hospitilization cognitive status: Alert/Oriented  (per her son)   Current cognitive status: Alert/Oriented  (per her son)   Walking or Climbing Stairs Difficulty yes   Walking or Climbing Stairs ambulation difficulty, requires equipment   Mobility Management standard walker, quad cane   Dressing/Bathing Difficulty yes   Dressing/Bathing Management does not have equipment but has a hard time getting into shower   Home Layout Able to live on 1st floor   Equipment Currently Used at Home glucometer;cane, quad;walker, standard   Patient currently being followed by outpatient case management? No   Do you currently have service(s) that help you manage your care at home? No   Do you take prescription medications? Yes   Do you have prescription coverage? Yes   Do you have any problems affording any of your prescribed medications? No   Is the patient taking medications as prescribed? yes   Who is going to help you get home at discharge? pt's son and his family   How do you get to doctors appointments? car, drives self   Are you on dialysis? No   Do you take coumadin? No   Discharge Plan A Skilled Nursing Facility   Discharge Plan B Skilled Nursing Facility   DME Needed Upon Discharge    (TBD)   Discharge Plan discussed with: Patient;Adult children  (DIL and grandchildren)   Transition of Care Barriers Other (see comments)  (primary  language is Faroese)     Assessment done with pt in bed. Son, MAURICE and grandchildren at bedside. They live in [place] and assist pt as needed. Pt has 2 other sons. (1 in [place] and 1 in [place]). Pt's primary language is Faroese and she speaks very little English. Her son, Martin, translated for the assessment.   Per her son, they spoke to the doctor about rehab after her discharge here. Son says that is what the patient wanted to do so she would feel safer going home alone after rehab. Son says they chose [facility] at [place] since it is near the pt's apartment and near the son's home.   2-TCU at [facility]. [place] of choice signed. PASRR completed and given to [facility].   [facility] notified of pt's choices. Pt no longer sees Dr. Delacruz and is now setup with   Dr. Humphreys as PCP at [facility] in [place]. Pt has a standard walker that the son purchased at [facility].   Pt is having a hard time getting her legs over the tub to shower. Spoke to son about a tub bench.

## 2024-11-27 NOTE — ANESTHESIA PREPROCEDURE EVALUATION
Belarusian Speaking                                                                      11/27/2024  Gabriela Lozano is a 79 y.o., presents for left femur IM timi after foot got caught in sheets   PMHx DM2, HTN, spinal stenosis, Cardiac stents x 4    Per PCP note 08/14/2024 pt is on plavix, coreg, isosorbide, lisinopril/HCTZ, metformin, insulin, levothyroxine      Pre-op Assessment    I have reviewed the NPO Status.      Review of Systems     Latest Reference Range & Units 11/27/24 04:18   WBC 4.50 - 11.50 x10(3)/mcL 8.47   Hemoglobin 12.0 - 16.0 g/dL 8.4 (L)   Hematocrit 37.0 - 47.0 % 27.0 (L)   Platelet Count 130 - 400 x10(3)/mcL 192   Sodium 136 - 145 mmol/L 138   Potassium 3.5 - 5.1 mmol/L 4.9   Chloride 98 - 107 mmol/L 108 (H)   CO2 23 - 31 mmol/L 22 (L)   Anion Gap mEq/L 8.0   BUN 9.8 - 20.1 mg/dL 37.4 (H)   Creatinine 0.55 - 1.02 mg/dL 1.15 (H)   BUN/CREAT RATIO  33   eGFR mL/min/1.73/m2 48   Glucose 82 - 115 mg/dL 105   Calcium 8.4 - 10.2 mg/dL 8.7   Phosphorus Level 2.3 - 4.7 mg/dL 3.1   Magnesium  1.60 - 2.60 mg/dL 0.90 (LL)   ALP 40 - 150 unit/L 36 (L)   PROTEIN TOTAL 5.8 - 7.6 gm/dL 5.1 (L)   Albumin 3.4 - 4.8 g/dL 3.0 (L)   (LL): Data is critically low  (L): Data is abnormally low  (H): Data is abnormally high    Physical Exam  General: Well nourished, Cooperative, Alert and Oriented    Airway:  Mallampati: II   Mouth Opening: Normal  TM Distance: Normal  Tongue: Normal  Neck ROM: Normal ROM    Dental:  Intact        Anesthesia Plan  Type of Anesthesia, risks & benefits discussed:    Anesthesia Type: Gen ETT  Intra-op Monitoring Plan: Standard ASA Monitors  Post Op Pain Control Plan: IV/PO Opioids PRN and multimodal analgesia  Induction:  IV  Airway Plan: Direct  Informed Consent: Informed consent signed with the Patient and all parties understand the risks and agree with anesthesia plan.  All questions answered. Patient consented to blood products? No  ASA  Score: 3  Day of Surgery Review of History & Physical: H&P Update referred to the surgeon/provider.  Anesthesia Plan Notes: Premedication with Midazolam  Technique: GETA - pt on home plavix so spinal should be avoided  Ketamine on induction - ofirmev f none in last 4-6 hours   Will avoid toradol due to bump in creatinine and use goal directed IVFs  PONV Prophylaxis   PACU Postop       Ready For Surgery From Anesthesia Perspective.     .

## 2024-11-27 NOTE — HPI
Trip and fall while getting OOB. Legs got caught in sheets. PMH: HTN, DM. No blood thinners. Son translates. Pain isoalted to L leg. Did not hit head or have LOC, GCS 15. Pain well controlled.

## 2024-11-27 NOTE — H&P
[facility] - Emergency Dept  Trauma  History & Physical    Patient Name: Gabriela Lozano  MRN: [id number]  Admission Date: 11/26/2024  Attending Physician: Jose Trotter MD   Primary Care Provider: Jude Cabello MD    Patient information was obtained from patient and ER records.     Subjective:     Chief Complaint/Reason for Admission: FFS    History of Present Illness: Trip and fall while getting OOB. Legs got caught in sheets. PMH: HTN, DM. No blood thinners. Son translates. Pain isoalted to L leg. Did not hit head or have LOC, GCS 15. Pain well controlled.     No current facility-administered medications on file prior to encounter.     Current Outpatient Medications on File Prior to Encounter   Medication Sig    HYDROcodone-acetaminophen (NORCO)  mg per tablet Take 1 tablet by mouth every 6 (six) hours as needed for Pain.       Review of patient's allergies indicates:  No Known Allergies    No past medical history on file.  No past surgical history on file.  Family History    None       Tobacco Use    Smoking status: Not on file    Smokeless tobacco: Not on file   Substance and Sexual Activity    Alcohol use: Not on file    Drug use: Not on file    Sexual activity: Not on file     Review of Systems   Constitutional:  Negative for chills and fever.   HENT:  Negative for ear pain and trouble swallowing.    Eyes:  Negative for pain and redness.   Respiratory:  Negative for cough and chest tightness.    Cardiovascular:  Negative for chest pain, palpitations and leg swelling.   Gastrointestinal:  Negative for abdominal distention, abdominal pain, nausea and vomiting.   Genitourinary:  Negative for difficulty urinating.   Musculoskeletal:  Positive for arthralgias, joint swelling and myalgias. Negative for back pain and neck pain.   Skin:  Negative for color change, pallor and wound.   Neurological:  Negative for dizziness, syncope, speech difficulty, weakness, light-headedness,  numbness and headaches.   Psychiatric/Behavioral:  Negative for agitation and suicidal ideas.    All other systems reviewed and are negative.    Objective:     Vital Signs (Most Recent):  Temp: 97.8 °F (36.6 °C) (11/26/24 1956)  Pulse: 79 (11/26/24 2231)  Resp: 13 (11/26/24 2231)  BP: 125/61 (11/26/24 2231)  SpO2: 98 % (11/26/24 2231) Vital Signs (24h Range):  Temp:  [97.8 °F (36.6 °C)] 97.8 °F (36.6 °C)  Pulse:  [76-83] 79  Resp:  [13-20] 13  SpO2:  [97 %-98 %] 98 %  BP: (125-190)/(61-99) 125/61     Weight: 99.8 kg (220 lb)  Body mass index is 42.97 kg/m².     Physical Exam  Constitutional:       Appearance: Normal appearance.   HENT:      Head: Normocephalic and atraumatic.      Nose: Nose normal.   Eyes:      Pupils: Pupils are equal, round, and reactive to light.   Cardiovascular:      Rate and Rhythm: Normal rate.      Pulses: Normal pulses.      Comments: Normal peripheral pulses  Pulmonary:      Effort: Pulmonary effort is normal. No respiratory distress.   Chest:      Chest wall: No tenderness.   Abdominal:      General: Abdomen is flat. Bowel sounds are normal. There is no distension.      Palpations: Abdomen is soft.      Tenderness: There is no abdominal tenderness.   Musculoskeletal:         General: Swelling, tenderness, deformity and signs of injury present.      Cervical back: Normal range of motion and neck supple. No tenderness.   Skin:     General: Skin is warm and dry.      Capillary Refill: Capillary refill takes less than 2 seconds.      Findings: No lesion.   Neurological:      General: No focal deficit present.      Mental Status: She is alert and oriented to person, place, and time. Mental status is at baseline.   Psychiatric:         Mood and Affect: Mood normal.         Behavior: Behavior normal.         Thought Content: Thought content normal.         Judgment: Judgment normal.            I have reviewed all pertinent lab results within the past 24 hours.    Significant Diagnostics:  I  have reviewed all pertinent imaging results/findings within the past 24 hours.    Assessment/Plan:     * Unspecified fracture of lower end of left femur, initial encounter for closed fracture  Admit  IVF given creat, most recent creat 2 years ago 1.3  MMPC  Diabetic diet, CLD at midnight, NPO 5 am  Ortho consult  SSI  L knee in immobilizer  NWB LLE  Q4 neurovascular checks      VTE Risk Mitigation (From admission, onward)           Ordered     enoxaparin injection 40 mg  Every 12 hours         11/26/24 2255     IP VTE HIGH RISK PATIENT  Once         11/26/24 2255     Place sequential compression device  Until discontinued         11/26/24 2255                    JOSE R Gomez  Trauma  [facility] - Emergency Dept

## 2024-11-27 NOTE — PLAN OF CARE
Problem: Adult Inpatient Plan of Care  Goal: Plan of Care Review  Outcome: Progressing  Goal: Patient-Specific Goal (Individualized)  Outcome: Progressing  Goal: Absence of Hospital-Acquired Illness or Injury  Outcome: Progressing  Goal: Optimal Comfort and Wellbeing  Outcome: Progressing  Goal: Readiness for Transition of Care  Outcome: Progressing     Problem: Bariatric Environmental Safety  Goal: Safety Maintained with Care  Outcome: Progressing     Problem: Fall Injury Risk  Goal: Absence of Fall and Fall-Related Injury  Outcome: Progressing     Problem: Wound  Goal: Optimal Coping  Outcome: Progressing  Goal: Optimal Functional Ability  Outcome: Progressing  Goal: Absence of Infection Signs and Symptoms  Outcome: Progressing  Goal: Improved Oral Intake  Outcome: Progressing  Goal: Optimal Pain Control and Function  Outcome: Progressing  Goal: Skin Health and Integrity  Outcome: Progressing  Goal: Optimal Wound Healing  Outcome: Progressing

## 2024-11-27 NOTE — OP NOTE
[facility]                       [address] [place] [address]    PATIENT NAME:      ROGELIO GAGNON   YOB: 1945  CSN:               [id number]  MRN:               [id number]  ADMIT DATE:        11/26/2024 20:03:00  PHYSICIAN:         Godwin Teixeira MD                          OPERATIVE REPORT      DATE OF SURGERY:    11/27/2024 00:00:00    SURGEON:  Godwin Teixeira MD    PREOPERATIVE DIAGNOSIS:  Left supracondylar distal femur fracture without   intercondylar extension.    POSTOPERATIVE DIAGNOSIS:  Left supracondylar distal femur fracture without   intercondylar extension.    PROCEDURE:  Operative stabilization of left distal femur fracture without   intercondylar extension, CPT 07986    ANESTHESIA:  General.    ASSISTANT:  HYACINTH De León, necessary for a skilled set of hands to assist   with reduction of the fracture as well as application of hardware and deep   closure.    IMPLANTS:  Synthes 12 x 320 mm retrograde femoral nail with locking attachment   washer.    COMPLICATIONS:  None.    COUNTS:  All counts correct x2 at the end of the case.    INDICATIONS FOR PROCEDURE:  The patient is an 79-year-old female, who had a fall   at home, sustained displaced left distal femur fracture.  She had a CT scan   performed showed no intercondylar split.  She was admitted to the trauma   service.  The risks and benefits of treatment were discussed at length with the   patient and family.  She is brought to the operating room for operative   stabilization today.    PROCEDURE IN DETAIL:  After informed consent was obtained, the patient was met   in the preoperative holding area and her site was marked.  She was taken to the   operating room.  She was placed supine on the operating table and general   anesthesia was induced.  All bony prominences were well padded.  Preoperative   antibiotics were given.  Her  left lower extremity was prepped and draped in a   standard sterile fashion.  A time-out was done indicating correct operative limb   and procedure.  She had proximal tibia traction applied.  She was pulled over   radiolucent triangle.  The fracture was pulled out to length.  The length and   rotation and overall alignment was corrected with traction as well as bumps.  We   made an incision of the anterior aspect of the knee, it was carried down   through the patellar tendon, a starting point for a retrograde femoral nail was obtained.  Opening   reamer was passed.  The ball-tipped guidewire was placed centered within the   femoral canal was reamed up to 13.5 mm.  The length was measured and a 12 x 320   mm nail was malleted into position.  An oblique screw was placed into the distal   segment to correct rotation and hold it in a still position.  Two proximal   interlocking screws were placed in a perfect Ione technique.  We then made an   incision on the lateral aspect of the knee was carried down through the IT band.    The locking attachment washer was positioned and 2 locking screws were placed   into the distal femur segment through the nail.  We then placed 2 above and 2   below using a 3.5 mm locking screws.  Her knee was found to be in stable, good   position and good overall alignment.  She had good range of motion after the jig was   removed.  The wounds were thoroughly irrigated and closed using a #1 Stratafix,   #1 Vicryl, 2-0 Monocryl, and staples.  Xeroform, 4 x 4's, and cast padding and   Ace bandage were applied.  The patient was awakened, extubated, and taken to   recovery in stable condition.    POSTOPERATIVE PLAN:  She will be admitted to the floor.  She can weightbear as   tolerated left lower extremity.  Full range of motion of the left knee.  Lovenox   for DVT prophylaxis while in-house.  Switch to enteric-coated aspirin 81 mg   p.o. b.i.d. upon discharge.  She will need Case Management  evaluation for   placement because she does live alone.        ______________________________  MD WILSON Crouch/BRISEIDA  DD:  11/27/2024  Time:  11:18AM  DT:  11/27/2024  Time:  12:21PM  Job #:  [id number]      OPERATIVE REPORT

## 2024-11-27 NOTE — PT/OT/SLP PROGRESS
Physical Therapy Treatment    Patient Name:  Gabriela Lozano   MRN:  [id number]    PT eval attempted. Patient recently returned from surgery. Asked for PT to return tomorrow. PT to f/u.

## 2024-11-27 NOTE — TRANSFER OF CARE
"Anesthesia Transfer of Care Note    Patient: Gabriela Lozano    Procedure(s) Performed: Procedure(s) (LRB):  INSERTION, INTRAMEDULLARY MARCOS, FEMUR, DISTAL, RETROGRADE (Left)    Patient location: PACU    Anesthesia Type: general    Transport from OR: Transported from OR on room air with adequate spontaneous ventilation    Post pain: adequate analgesia    Post assessment: no apparent anesthetic complications and tolerated procedure well    Post vital signs: stable    Level of consciousness: awake    Nausea/Vomiting: no nausea/vomiting    Complications: none    Transfer of care protocol was followed    Last vitals: Visit Vitals  BP (!) 143/64   Pulse 79   Temp 36.4 °C (97.5 °F) (Temporal)   Resp 13   Ht 4' 11" (1.499 m)   Wt 84.4 kg (186 lb)   SpO2 100%   BMI 37.57 kg/m²     "

## 2024-11-27 NOTE — PROGRESS NOTES
TERTIARY TRAUMA SURVEY (TTS)    List Injuries Identified to Date:   1. Femur fracture, left, distal    [x]Problems list reviewed  List Operations and Procedures:   1. IMN of L femur, 11/27    No past surgical history on file.    Incidental findings:   1. None    Past Medical History:   1. HTN  2. DM    Active Ambulatory Problems     Diagnosis Date Noted    No Active Ambulatory Problems     Resolved Ambulatory Problems     Diagnosis Date Noted    No Resolved Ambulatory Problems     Past Medical History:   Diagnosis Date    Diabetes mellitus     Hypertension      Past Medical History:   Diagnosis Date    Diabetes mellitus     Hypertension        Tertiary Physical Exam:     Physical Exam  Vitals and nursing note reviewed.   Constitutional:       Appearance: Normal appearance.   HENT:      Head: Normocephalic and atraumatic.      Right Ear: External ear normal.      Left Ear: External ear normal.      Nose: Nose normal.      Mouth/Throat:      Mouth: Mucous membranes are moist.      Pharynx: Oropharynx is clear.   Eyes:      Extraocular Movements: Extraocular movements intact.   Cardiovascular:      Rate and Rhythm: Normal rate.   Pulmonary:      Effort: Pulmonary effort is normal. No respiratory distress.      Breath sounds: No wheezing or rales.   Abdominal:      General: Abdomen is flat.      Palpations: Abdomen is soft.   Musculoskeletal:      Cervical back: Normal range of motion. No tenderness.      Comments: Knee immobilizer in place to LLE   Skin:     General: Skin is warm and dry.   Neurological:      General: No focal deficit present.      Mental Status: She is alert and oriented to person, place, and time.   Psychiatric:         Mood and Affect: Mood normal.         Behavior: Behavior normal.         Thought Content: Thought content normal.         Judgment: Judgment normal.         Imaging Review:     Imaging Results              CT Knee Without Contrast Left (Final result)  Result time 11/26/24 21:57:29       Final result by Jose Solis MD (11/26/24 21:57:29)                   Impression:      Distal femur fracture is described above      Electronically signed by: Jose Solis MD  Date:    11/26/2024  Time:    21:57               Narrative:    EXAMINATION:  CT KNEE WITHOUT CONTRAST LEFT    CLINICAL HISTORY:  Fracture, knee;    TECHNIQUE:  Automatic exposure control (AEC) was utilized for dose reduction.    Dose: 446 mGycm    COMPARISON:  None    FINDINGS:  There is a distal femur fracture.  The fracture is somewhat impacted with some overlapping of fragments.  The distal fragment is lateral to the medial fragment.  There is mild comminution.  Fracture does not extend intra-articular.  There is a small joint effusion.                                       X-Ray Knee 3 View Left (Final result)  Result time 11/26/24 21:30:30   Procedure changed from X-Ray Knee Complete 4 or More Views Left     Final result by Jose Solis MD (11/26/24 21:30:30)                   Impression:      Displaced distal femur fracture.      Electronically signed by: Jose Solis MD  Date:    11/26/2024  Time:    21:30               Narrative:    EXAMINATION:  XR KNEE 3 VIEW LEFT    CLINICAL HISTORY:  fall; Unspecified fall, initial encounter    TECHNIQUE:  AP, lateral, and Merchant views of the left knee were performed.    COMPARISON:  None    FINDINGS:  There is a displaced distal femur fracture.  It appears oblique in nature.  There is overlapping of fragments.                                       X-Ray Hip 2 or 3 views Left with Pelvis when performed (Final result)  Result time 11/26/24 21:31:27      Final result by Jose Solis MD (11/26/24 21:31:27)                   Impression:      No acute abnormalities are seen      Electronically signed by: Jose Solis MD  Date:    11/26/2024  Time:    21:31               Narrative:    EXAMINATION:  XR HIP WITH PELVIS WHEN PERFORMED 2 OR 3 VIEWS LEFT    CLINICAL  "HISTORY:  fall;    TECHNIQUE:  AP view of the pelvis and frog leg lateral view of the left hip were performed.    COMPARISON:  None    FINDINGS:  There are no fractures seen.  There is no dislocation.  There are no bony lesions noted.                                       X-Ray Chest AP Portable (Final result)  Result time 11/26/24 21:22:47      Final result by Jose Solis MD (11/26/24 21:22:47)                   Impression:      No acute disease is seen      Electronically signed by: Jose Solis MD  Date:    11/26/2024  Time:    21:22               Narrative:    EXAMINATION:  XR CHEST AP PORTABLE    CLINICAL HISTORY:  Unspecified fall, initial encounter    TECHNIQUE:  Single frontal view of the chest was performed.    COMPARISON:  01/02/2018    FINDINGS:  Bowel lungs are clear.  Heart size is within normal limits.  Costophrenic angles are clear.                                       Lab Review:   CBC:  Recent Labs   Lab Result Units 11/26/24 2035 11/27/24  0418   WBC x10(3)/mcL 9.61 8.47   RBC x10(6)/mcL 3.33* 2.87*   Hgb g/dL 9.7* 8.4*   Hct % 30.8* 27.0*   Platelet x10(3)/mcL 207 192   MCV fL 92.5 94.1*   MCH pg 29.1 29.3   MCHC g/dL 31.5* 31.1*       CMP:  Recent Labs   Lab Result Units 11/26/24 2035 11/27/24  0418   Calcium mg/dL 9.5 8.7   Albumin g/dL 3.4 3.0*   Sodium mmol/L 140 138   Potassium mmol/L 4.9 4.9   CO2 mmol/L 24 22*   Chloride mmol/L 108* 108*   Blood Urea Nitrogen mg/dL 31.1* 37.4*   Creatinine mg/dL 1.19* 1.15*   ALP unit/L 49 36*   ALT unit/L 12 11   AST unit/L 18 14   Bilirubin Total mg/dL 0.2 0.3       Troponin:  No results for input(s): "TROPONINI" in the last 2160 hours.    ETOH:  No results for input(s): "ETHANOL" in the last 72 hours.     Urine Drug Screen:  No results for input(s): "COCAINE", "OPIATE", "BARBITURATE", "AMPHETAMINE", "FENTANYL", "CANNABINOIDS", "MDMA" in the last 72 hours.    Invalid input(s): "BENZODIAZEPINE", "PHENCYCLIDINE"     Plan:     Unspecified fracture " of lower end of left femur, initial encounter for closed fracture  - Orthopedic surgery consulted, appreciate recs   - To OR today 11/27  for IMN of left femur  - Follow up orthopedic surgery recommendations postoperatively   - Q4H neurovascular checks    Fall  - MMPC  - Bowel regimen   - Resume diabetic diet postoperatively  - Daily labs  - Pulmonary hygiene: IS    Diabetes mellitus  - SSI    Elevated renal indices   - mIVF @ 100 mL/hr   - Trend BUN/Cr    Hypomagnesemia   - Replete PRN  - Trend Mag        Lazaro Álvarez MD  General Surgery PGY-1  [facility]

## 2024-11-27 NOTE — ED PROVIDER NOTES
Encounter Date: 11/26/2024    SCRIBE #1 NOTE: I, Yfn Zuluaga, am scribing for, and in the presence of,  Jose Trotter MD. I have scribed the following portions of the note - Other sections scribed: HPI,ROS,PE.       History     Chief Complaint   Patient presents with    Knee Injury     Pt arrives via [facility], EMS reports pt fell onto Left knee, no head strike , + blood thinners. Pt c/o Left knee pain, pt unable to bare weight, difficulty straightening leg, however appears straight on ems stretcher, possible patellar deformity, Pt is [place] speaking.      80 y/o female with PMHx of DM, HTN, and HLD presents to ED via EMS c/o fall onset tonight. Pt reports she tripped over her blanket, fell, and directly impacted her left knee. She denies head trauma or LOC. She denies any neck, back, head, chest, or hip pain.     The history is provided by the patient. The history is limited by a language barrier. A  was used (son was the ).     Review of patient's allergies indicates:  No Known Allergies  No past medical history on file.  No past surgical history on file.  No family history on file.     Review of Systems   Constitutional:  Negative for chills and fever.   HENT:  Negative for congestion, drooling and sore throat.    Eyes:  Negative for pain and visual disturbance.   Respiratory:  Negative for chest tightness, shortness of breath and wheezing.    Cardiovascular:  Negative for chest pain, palpitations and leg swelling.   Gastrointestinal:  Negative for abdominal pain, nausea and vomiting.   Genitourinary:  Negative for dysuria and hematuria.   Musculoskeletal:  Positive for arthralgias (left knee pain) and myalgias (left knee pain). Negative for back pain, neck pain and neck stiffness.        Denies hip pain   Skin:  Negative for pallor and rash.   Neurological:  Negative for weakness, numbness and headaches.   Hematological:  Does not bruise/bleed easily.       Physical  Exam     Initial Vitals [11/26/24 1956]   BP Pulse Resp Temp SpO2   (!) 188/98 83 20 97.8 °F (36.6 °C) 98 %      MAP       --         Physical Exam    Nursing note and vitals reviewed.  Constitutional: She appears well-developed and well-nourished. She is not diaphoretic. No distress.   HENT:   Head: Normocephalic and atraumatic.   Nose: Nose normal. Mouth/Throat: Oropharynx is clear and moist.   Eyes: EOM are normal. Pupils are equal, round, and reactive to light.   Neck: Neck supple.   No C-spine tenderness.    Normal range of motion.  Cardiovascular:  Normal rate, regular rhythm and normal heart sounds.           No murmur heard.  Palpable pulses.    Pulmonary/Chest: Breath sounds normal. No respiratory distress. She has no wheezes. She has no rales. She exhibits no tenderness.   Abdominal: Abdomen is soft. She exhibits no distension. There is no abdominal tenderness.   Musculoskeletal:         General: Tenderness (tenderness to Left knee.) present.      Cervical back: Normal range of motion and neck supple.      Comments: No tenderness to hips bilaterally.   No midline spine tenderness.      Neurological: She is alert and oriented to person, place, and time. She has normal strength. No cranial nerve deficit or sensory deficit. GCS score is 15. GCS eye subscore is 4. GCS verbal subscore is 5. GCS motor subscore is 6.   Awake and alert.  Answers questions appropriately.   Intact sensation to LLE.    Skin: Skin is warm. Capillary refill takes less than 2 seconds. No rash noted.         ED Course   Procedures  Labs Reviewed   CBC W/ AUTO DIFFERENTIAL    Narrative:     The following orders were created for panel order CBC auto differential.  Procedure                               Abnormality         Status                     ---------                               -----------         ------                     CBC with Differential[[id number]]                            In process                   Please view  results for these tests on the individual orders.   COMPREHENSIVE METABOLIC PANEL   CBC WITH DIFFERENTIAL          Imaging Results              X-Ray Knee Complete 4 or More Views Left (In process)                      Medications   fentaNYL injection 50 mcg (has no administration in time range)   ondansetron injection 4 mg (has no administration in time range)     Medical Decision Making  Amount and/or Complexity of Data Reviewed  Labs: ordered. Decision-making details documented in ED Course.  Radiology: ordered. Decision-making details documented in ED Course.    Risk  Prescription drug management.    Differential diagnosis (includes but is not limited to):   ***    MDM Narrative  ***    Update: ***    Dispo: ***    My independent radiology interpretation: ***  Point of care US (independently performed and interpreted): ***  Decision rules/clinical scoring: ***    Sepsis Perfusion Assessment: ***    Amount and/or Complexity of Data Reviewed  Independent historian: {MDMINDEPENDENTHISTORIAN:82790}   Summary of history: ***  External data reviewed: {MDMEXTERNALDATAREVIEWED:58583}  Summary of data reviewed: ***  Risk and benefits of testing: discussed   Labs: ordered and reviewed  Radiology: ordered and independent interpretation performed (see above or ED course)  {MDMEC}  Discussion of management or test interpretation with external provider(s): {MDMEXTERNALPROVIDER:34076}   Summary of discussion: ***    Risk  {MDMRISK:70646}    Critical Care  {MDMCRITICALCARE:03316}    Data Reviewed/Counseling: I have personally reviewed the patient's vital signs, nursing notes, and other relevant tests, information, and imaging. I had a detailed discussion regarding the historical points, exam findings, and any diagnostic results supporting the discharge diagnosis. I personally performed the history, PE, MDM and procedures as documented above and agree with the scribe's documentation.    Portions of this note were dictated  using voice recognition software. Although it was reviewed for accuracy, some inherent voice recognition errors may have occurred and may be present in this document.          Scribe Attestation:   Scribe #1: I performed the above scribed service and the documentation accurately describes the services I performed. I attest to the accuracy of the note.    Attending Attestation:           Physician Attestation for Scribe:  Physician Attestation Statement for Scribe #1: I, Jose Trotter MD, reviewed documentation, as scribed by Yfn Zuluaga in my presence, and it is both accurate and complete.                                    Clinical Impression:  Final diagnoses:  [W19.XXXA] Fall

## 2024-11-27 NOTE — ANESTHESIA PROCEDURE NOTES
Intubation    Date/Time: 11/27/2024 10:09 AM    Performed by: Mariel Julio  Authorized by: Kenyetta Whelan MD    Intubation:     Induction:  Intravenous    Intubated:  Postinduction    Mask Ventilation:  Easy mask    Attempts:  1    Attempted By:  Student    Method of Intubation:  Direct    Blade:  Yaima 3    Laryngeal View Grade: Grade IIA - cords partially seen      Difficult Airway Encountered?: No      Complications:  None    Airway Device:  Oral endotracheal tube    Airway Device Size:  7.0    Style/Cuff Inflation:  Cuffed (inflated to minimal occlusive pressure)    Tube secured:  21    Secured at:  The lips    Placement Verified By:  Capnometry    Complicating Factors:  None    Findings Post-Intubation:  BS equal bilateral and atraumatic/condition of teeth unchanged

## 2024-11-27 NOTE — SUBJECTIVE & OBJECTIVE
No current facility-administered medications on file prior to encounter.     Current Outpatient Medications on File Prior to Encounter   Medication Sig    HYDROcodone-acetaminophen (NORCO)  mg per tablet Take 1 tablet by mouth every 6 (six) hours as needed for Pain.       Review of patient's allergies indicates:  No Known Allergies    No past medical history on file.  No past surgical history on file.  Family History    None       Tobacco Use    Smoking status: Not on file    Smokeless tobacco: Not on file   Substance and Sexual Activity    Alcohol use: Not on file    Drug use: Not on file    Sexual activity: Not on file     Review of Systems   Constitutional:  Negative for chills and fever.   HENT:  Negative for ear pain and trouble swallowing.    Eyes:  Negative for pain and redness.   Respiratory:  Negative for cough and chest tightness.    Cardiovascular:  Negative for chest pain, palpitations and leg swelling.   Gastrointestinal:  Negative for abdominal distention, abdominal pain, nausea and vomiting.   Genitourinary:  Negative for difficulty urinating.   Musculoskeletal:  Positive for arthralgias, joint swelling and myalgias. Negative for back pain and neck pain.   Skin:  Negative for color change, pallor and wound.   Neurological:  Negative for dizziness, syncope, speech difficulty, weakness, light-headedness, numbness and headaches.   Psychiatric/Behavioral:  Negative for agitation and suicidal ideas.    All other systems reviewed and are negative.    Objective:     Vital Signs (Most Recent):  Temp: 97.8 °F (36.6 °C) (11/26/24 1956)  Pulse: 79 (11/26/24 2231)  Resp: 13 (11/26/24 2231)  BP: 125/61 (11/26/24 2231)  SpO2: 98 % (11/26/24 2231) Vital Signs (24h Range):  Temp:  [97.8 °F (36.6 °C)] 97.8 °F (36.6 °C)  Pulse:  [76-83] 79  Resp:  [13-20] 13  SpO2:  [97 %-98 %] 98 %  BP: (125-190)/(61-99) 125/61     Weight: 99.8 kg (220 lb)  Body mass index is 42.97 kg/m².     Physical Exam  Constitutional:        Appearance: Normal appearance.   HENT:      Head: Normocephalic and atraumatic.      Nose: Nose normal.   Eyes:      Pupils: Pupils are equal, round, and reactive to light.   Cardiovascular:      Rate and Rhythm: Normal rate.      Pulses: Normal pulses.      Comments: Normal peripheral pulses  Pulmonary:      Effort: Pulmonary effort is normal. No respiratory distress.   Chest:      Chest wall: No tenderness.   Abdominal:      General: Abdomen is flat. Bowel sounds are normal. There is no distension.      Palpations: Abdomen is soft.      Tenderness: There is no abdominal tenderness.   Musculoskeletal:         General: Swelling, tenderness, deformity and signs of injury present.      Cervical back: Normal range of motion and neck supple. No tenderness.   Skin:     General: Skin is warm and dry.      Capillary Refill: Capillary refill takes less than 2 seconds.      Findings: No lesion.   Neurological:      General: No focal deficit present.      Mental Status: She is alert and oriented to person, place, and time. Mental status is at baseline.   Psychiatric:         Mood and Affect: Mood normal.         Behavior: Behavior normal.         Thought Content: Thought content normal.         Judgment: Judgment normal.            I have reviewed all pertinent lab results within the past 24 hours.    Significant Diagnostics:  I have reviewed all pertinent imaging results/findings within the past 24 hours.

## 2024-11-27 NOTE — CONSULTS
Chief Complaint:   Chief Complaint   Patient presents with    Knee Injury     Pt arrives via [facility], EMS reports pt fell onto Left knee, no head strike , + blood thinners. Pt c/o Left knee pain, pt unable to bare weight, difficulty straightening leg, however appears straight on ems stretcher, possible patellar deformity, Pt is Dutch speaking.        History of present illness:  79-year-old female presents to the ER.  Patient was ambulating her house when she got caught in some sheets.  Landed and twisted her leg.  Complaining of left knee pain as well as left leg pain.  Taken to the ER she was noted to have a left distal femur fracture.  Orthopedics was consulted for treatment this.  Patient was on Plavix.    History of Present Illness      No past medical history on file.    No past surgical history on file.    Current Facility-Administered Medications   Medication    acetaminophen tablet 650 mg    dextrose 10% bolus 125 mL 125 mL    dextrose 10% bolus 250 mL 250 mL    docusate sodium capsule 100 mg    enoxaparin injection 40 mg    gabapentin capsule 300 mg    glucagon (human recombinant) injection 1 mg    insulin aspart U-100 injection 0-5 Units    lactated ringers infusion    magnesium hydroxide 400 mg/5 ml suspension 2,400 mg    melatonin tablet 6 mg    methocarbamoL tablet 500 mg    oxyCODONE immediate release tablet 5 mg    oxyCODONE immediate release tablet Tab 10 mg    polyethylene glycol packet 17 g       Review of patient's allergies indicates:  No Known Allergies    No family history on file.    Social History     Socioeconomic History    Marital status:    Social History Narrative    ** Merged History Encounter **                Review of Systems:    Constitution: Negative for chills, fever, and sweats.  Negative for unexplained weight loss.    HENT:  Negative for headaches and blurry vision.    Cardiovascular:Negative for chest pain or irregular heart beat. Negative for  hypertension.    Respiratory:  Negative for cough and shortness of breath.    Gastrointestinal: Negative for abdominal pain, heartburn, melena, nausea, and vomitting.    Genitourinary:  Negative bladder incontinence and dysuria.    Musculoskeletal:  See HPI    Neurological: Negative for numbness.    Psychiatric/Behavioral: Negative for depression.  The patient is not nervous/anxious.      Endocrine: Negative for polyuria    Hematologic/Lymphatic: Negative for bleeding problem.  Does not bruise/bleed easily.    Skin: Negative for poor would healing and rash      Physical Examination:    Vital Signs:    Vitals:    11/27/24 0435   BP: 124/88   Pulse: 77   Resp: 15   Temp: 97.7 °F (36.5 °C)       Body mass index is 37.57 kg/m².    General: No acute distress, alert and oriented, healthy appearing    HEENT: Head is atraumatic, mucous membranes are moist    Neck: Supples, no JVD    Cardiovascular: Palpable dorsalis pedis and posterior tibial pulses, regular rate and rhythm to those pulses    Lungs: Breathing non-labored    Skin: no rashes appreciated    Neurologic: Can flex and extend knees, ankles, and toes. Sensation is grossly intact    Bilateral upper extremities:  Full range of motion.  It was significant pain.  No significant tenderness.  No crepitus noted.    Left lower extremity:  Tenderness and swelling noted about the left knee.  Sensation intact distally.  Positive FHL EHL    X-rays:  Three views of the left knee as well as CT scan reviewed.  Patient with a extra-articular distal femur fracture     Assessment::  Left distal femur fracture    Plan:  Patient was benefit from operative fixation.  Plan for ORIF of the left distal femur later today.  All questions answered to the patient's satisfaction.  No guarantees made.    This note was generated with the assistance of ambient listening technology. Verbal consent was obtained by the patient and accompanying visitor(s) for the recording of patient appointment to  facilitate this note. I attest to having reviewed and edited the generated note for accuracy, though some syntax or spelling errors may persist. Please contact the author of this note for any clarification.      This note was created using [employer] voice recognition software that occasionally misinterpreted phrases or words.    Consult note is delivered via Epic messaging service.

## 2024-11-27 NOTE — ASSESSMENT & PLAN NOTE
Admit  IVF given creat, most recent creat 2 years ago 1.3  MMPC  Diabetic diet, CLD at midnight, NPO 5 am  Ortho consult  SSI  L knee in immobilizer  NWB LLE  Q4 neurovascular checks

## 2024-11-27 NOTE — BRIEF OP NOTE
[facility] - 8th Floor Med Surg  Brief Operative Note    SUMMARY     Surgery Date: 11/27/2024     Surgeons and Role:     * Godwin Teixeira MD - Primary    Assisting Surgeon: None    Pre-op Diagnosis:  Left distal femur supracondylar fracture without intercondylar extension    Post-op Diagnosis:  same    Procedure(s) (LRB):  Open treatment of left supracondylar distal femur fracture- 67465    Anesthesia: Choice    Implants:  Implant Name Type Inv. Item Serial No.  Lot No. LRB No. Used Action   WASHER RFNA DAYNA LEFT 5 DEG - [id number]  WASHER RFNA DAYNA LEFT 5 DEG  [employer] [id number] Left 1 Implanted   SCREW DAYNA XL25 5X52MM - [id number]  SCREW DAYNA XL25 5X52MM  [employer] [id number] Left 1 Implanted   NAIL IM NAIL RFNA 5D [id number] - [id number]  NAIL IM NAIL RFNA 5D [id number]  [employer] [id number] Left 1 Implanted   SCREW DAYNA XL25 5X32MM - [id number]  SCREW DAYNA XL25 5X32MM  [employer] [id number] Left 1 Implanted   SCREW BONE LOCK IM NAIL 34X5MM - [id number]  SCREW BONE LOCK IM NAIL 34X5MM  [employer] [id number] Left 1 Implanted   5.0mm x 55mm Optilink Locking Screw      Left 1 Implanted   5.0mm x 60mm Optilink Locking Screw    [employer]  Left 1 Implanted   5.0mm x 75mm Optilink Locking Screw    [employer]ES  Left 1 Implanted   SCREW STRDRV VA DAYNA 3.5X70MM - [id number]  SCREW STRDRV VA DAYNA 3.5X70MM  [employer]  Left 2 Implanted   SCREW BONE VA LK 3.5X75MM - [id number]  SCREW BONE VA LK 3.5X75MM  [employer]  Left 1 Implanted   SCREW BONE VA ST 3.5X60MM - [id number]  SCREW BONE VA ST 3.5X60MM  [employer]  Left 1 Implanted       Operative Findings: see op report    Estimated Blood Loss: 100mL    Estimated Blood Loss has been documented.         Specimens:   Specimen (24h ago, onward)      None            [id number]  A/P: Tolerated procdure well. Admit to floor. WBAT to LLE. Full ROM. Lovenox while in hluse then ECASA 81mg PO BID upon d/c. CM eval for placement. Ancef for 24hrs.Dressing changes in 48hrs.      Godwin Teixeira,  MD  Orthopedic Trauma  [facility]

## 2024-11-28 LAB
ABO + RH BLD: NORMAL
ABO + RH BLD: NORMAL
ALBUMIN SERPL-MCNC: 3.1 G/DL (ref 3.4–4.8)
ALBUMIN/GLOB SERPL: 1.7 RATIO (ref 1.1–2)
ALP SERPL-CCNC: 30 UNIT/L (ref 40–150)
ALT SERPL-CCNC: 6 UNIT/L (ref 0–55)
ANION GAP SERPL CALC-SCNC: 7 MEQ/L
AST SERPL-CCNC: 15 UNIT/L (ref 5–34)
BASOPHILS # BLD AUTO: 0.01 X10(3)/MCL
BASOPHILS # BLD AUTO: 0.02 X10(3)/MCL
BASOPHILS NFR BLD AUTO: 0.1 %
BASOPHILS NFR BLD AUTO: 0.2 %
BILIRUB SERPL-MCNC: 0.3 MG/DL
BLD PROD TYP BPU: NORMAL
BLD PROD TYP BPU: NORMAL
BLOOD UNIT EXPIRATION DATE: NORMAL
BLOOD UNIT EXPIRATION DATE: NORMAL
BLOOD UNIT TYPE CODE: 600
BLOOD UNIT TYPE CODE: 600
BUN SERPL-MCNC: 37.2 MG/DL (ref 9.8–20.1)
CALCIUM SERPL-MCNC: 9 MG/DL (ref 8.4–10.2)
CHLORIDE SERPL-SCNC: 104 MMOL/L (ref 98–107)
CO2 SERPL-SCNC: 24 MMOL/L (ref 23–31)
CREAT SERPL-MCNC: 1.5 MG/DL (ref 0.55–1.02)
CREAT/UREA NIT SERPL: 25
CROSSMATCH INTERPRETATION: NORMAL
CROSSMATCH INTERPRETATION: NORMAL
CRP SERPL-MCNC: 69.9 MG/L
DISPENSE STATUS: NORMAL
DISPENSE STATUS: NORMAL
EOSINOPHIL # BLD AUTO: 0.01 X10(3)/MCL (ref 0–0.9)
EOSINOPHIL # BLD AUTO: 0.14 X10(3)/MCL (ref 0–0.9)
EOSINOPHIL NFR BLD AUTO: 0.1 %
EOSINOPHIL NFR BLD AUTO: 1.6 %
ERYTHROCYTE [DISTWIDTH] IN BLOOD BY AUTOMATED COUNT: 14.2 % (ref 11.5–17)
ERYTHROCYTE [DISTWIDTH] IN BLOOD BY AUTOMATED COUNT: 14.6 % (ref 11.5–17)
GFR SERPLBLD CREATININE-BSD FMLA CKD-EPI: 35 ML/MIN/1.73/M2
GLOBULIN SER-MCNC: 1.8 GM/DL (ref 2.4–3.5)
GLUCOSE SERPL-MCNC: 222 MG/DL (ref 82–115)
HCT VFR BLD AUTO: 18.1 % (ref 37–47)
HCT VFR BLD AUTO: 26.2 % (ref 37–47)
HGB BLD-MCNC: 5.8 G/DL (ref 12–16)
HGB BLD-MCNC: 8.7 G/DL (ref 12–16)
IMM GRANULOCYTES # BLD AUTO: 0.04 X10(3)/MCL (ref 0–0.04)
IMM GRANULOCYTES # BLD AUTO: 0.04 X10(3)/MCL (ref 0–0.04)
IMM GRANULOCYTES NFR BLD AUTO: 0.5 %
IMM GRANULOCYTES NFR BLD AUTO: 0.5 %
LYMPHOCYTES # BLD AUTO: 1.53 X10(3)/MCL (ref 0.6–4.6)
LYMPHOCYTES # BLD AUTO: 1.79 X10(3)/MCL (ref 0.6–4.6)
LYMPHOCYTES NFR BLD AUTO: 19.6 %
LYMPHOCYTES NFR BLD AUTO: 21 %
MAGNESIUM SERPL-MCNC: 2 MG/DL (ref 1.6–2.6)
MCH RBC QN AUTO: 29.3 PG (ref 27–31)
MCH RBC QN AUTO: 30.1 PG (ref 27–31)
MCHC RBC AUTO-ENTMCNC: 32 G/DL (ref 33–36)
MCHC RBC AUTO-ENTMCNC: 33.2 G/DL (ref 33–36)
MCV RBC AUTO: 88.2 FL (ref 80–94)
MCV RBC AUTO: 93.8 FL (ref 80–94)
MONOCYTES # BLD AUTO: 0.62 X10(3)/MCL (ref 0.1–1.3)
MONOCYTES # BLD AUTO: 0.79 X10(3)/MCL (ref 0.1–1.3)
MONOCYTES NFR BLD AUTO: 7.9 %
MONOCYTES NFR BLD AUTO: 9.3 %
NEUTROPHILS # BLD AUTO: 5.59 X10(3)/MCL (ref 2.1–9.2)
NEUTROPHILS # BLD AUTO: 5.73 X10(3)/MCL (ref 2.1–9.2)
NEUTROPHILS NFR BLD AUTO: 67.4 %
NEUTROPHILS NFR BLD AUTO: 71.8 %
NRBC BLD AUTO-RTO: 0 %
NRBC BLD AUTO-RTO: 0.4 %
PHOSPHATE SERPL-MCNC: 4.4 MG/DL (ref 2.3–4.7)
PLATELET # BLD AUTO: 135 X10(3)/MCL (ref 130–400)
PLATELET # BLD AUTO: 160 X10(3)/MCL (ref 130–400)
PLATELET # BLD EST: NORMAL 10*3/UL
PMV BLD AUTO: 10.1 FL (ref 7.4–10.4)
PMV BLD AUTO: 10.4 FL (ref 7.4–10.4)
POCT GLUCOSE: 224 MG/DL (ref 70–110)
POCT GLUCOSE: 286 MG/DL (ref 70–110)
POCT GLUCOSE: 311 MG/DL (ref 70–110)
POCT GLUCOSE: 319 MG/DL (ref 70–110)
POIKILOCYTOSIS BLD QL SMEAR: ABNORMAL
POTASSIUM SERPL-SCNC: 5.2 MMOL/L (ref 3.5–5.1)
PREALB SERPL-MCNC: 17.1 MG/DL (ref 14–37)
PROT SERPL-MCNC: 4.9 GM/DL (ref 5.8–7.6)
RBC # BLD AUTO: 1.93 X10(6)/MCL (ref 4.2–5.4)
RBC # BLD AUTO: 2.97 X10(6)/MCL (ref 4.2–5.4)
RBC MORPH BLD: ABNORMAL
SODIUM SERPL-SCNC: 135 MMOL/L (ref 136–145)
UNIT NUMBER: NORMAL
UNIT NUMBER: NORMAL
WBC # BLD AUTO: 7.8 X10(3)/MCL (ref 4.5–11.5)
WBC # BLD AUTO: 8.51 X10(3)/MCL (ref 4.5–11.5)

## 2024-11-28 PROCEDURE — 86923 COMPATIBILITY TEST ELECTRIC: CPT | Mod: 91

## 2024-11-28 PROCEDURE — 36415 COLL VENOUS BLD VENIPUNCTURE: CPT | Performed by: NURSE PRACTITIONER

## 2024-11-28 PROCEDURE — 99233 SBSQ HOSP IP/OBS HIGH 50: CPT | Mod: ,,, | Performed by: SURGERY

## 2024-11-28 PROCEDURE — 25000003 PHARM REV CODE 250: Performed by: NURSE PRACTITIONER

## 2024-11-28 PROCEDURE — 84134 ASSAY OF PREALBUMIN: CPT | Performed by: NURSE PRACTITIONER

## 2024-11-28 PROCEDURE — 94799 UNLISTED PULMONARY SVC/PX: CPT

## 2024-11-28 PROCEDURE — 63600175 PHARM REV CODE 636 W HCPCS: Performed by: NURSE PRACTITIONER

## 2024-11-28 PROCEDURE — 99900035 HC TECH TIME PER 15 MIN (STAT)

## 2024-11-28 PROCEDURE — 85025 COMPLETE CBC W/AUTO DIFF WBC: CPT

## 2024-11-28 PROCEDURE — 86140 C-REACTIVE PROTEIN: CPT | Performed by: NURSE PRACTITIONER

## 2024-11-28 PROCEDURE — 84100 ASSAY OF PHOSPHORUS: CPT

## 2024-11-28 PROCEDURE — 80053 COMPREHEN METABOLIC PANEL: CPT | Performed by: NURSE PRACTITIONER

## 2024-11-28 PROCEDURE — 36415 COLL VENOUS BLD VENIPUNCTURE: CPT

## 2024-11-28 PROCEDURE — 94760 N-INVAS EAR/PLS OXIMETRY 1: CPT

## 2024-11-28 PROCEDURE — 85025 COMPLETE CBC W/AUTO DIFF WBC: CPT | Performed by: NURSE PRACTITIONER

## 2024-11-28 PROCEDURE — P9016 RBC LEUKOCYTES REDUCED: HCPCS

## 2024-11-28 PROCEDURE — 11000001 HC ACUTE MED/SURG PRIVATE ROOM

## 2024-11-28 PROCEDURE — 63600175 PHARM REV CODE 636 W HCPCS

## 2024-11-28 PROCEDURE — 30233N1 TRANSFUSION OF NONAUTOLOGOUS RED BLOOD CELLS INTO PERIPHERAL VEIN, PERCUTANEOUS APPROACH: ICD-10-PCS | Performed by: SURGERY

## 2024-11-28 PROCEDURE — 83735 ASSAY OF MAGNESIUM: CPT

## 2024-11-28 PROCEDURE — 27000221 HC OXYGEN, UP TO 24 HOURS

## 2024-11-28 RX ORDER — HYDROCODONE BITARTRATE AND ACETAMINOPHEN 500; 5 MG/1; MG/1
TABLET ORAL
Status: DISCONTINUED | OUTPATIENT
Start: 2024-11-28 | End: 2024-11-30

## 2024-11-28 RX ADMIN — CEFAZOLIN 2 G: 2 INJECTION, POWDER, FOR SOLUTION INTRAMUSCULAR; INTRAVENOUS at 04:11

## 2024-11-28 RX ADMIN — POLYETHYLENE GLYCOL 3350 17 G: 17 POWDER, FOR SOLUTION ORAL at 08:11

## 2024-11-28 RX ADMIN — ACETAMINOPHEN 650 MG: 325 TABLET, FILM COATED ORAL at 04:11

## 2024-11-28 RX ADMIN — METHOCARBAMOL 500 MG: 500 TABLET ORAL at 12:11

## 2024-11-28 RX ADMIN — METHOCARBAMOL 500 MG: 500 TABLET ORAL at 10:11

## 2024-11-28 RX ADMIN — ENOXAPARIN SODIUM 40 MG: 40 INJECTION SUBCUTANEOUS at 09:11

## 2024-11-28 RX ADMIN — GABAPENTIN 300 MG: 300 CAPSULE ORAL at 08:11

## 2024-11-28 RX ADMIN — METHOCARBAMOL 500 MG: 500 TABLET ORAL at 01:11

## 2024-11-28 RX ADMIN — INSULIN ASPART 5 UNITS: 100 INJECTION, SOLUTION INTRAVENOUS; SUBCUTANEOUS at 10:11

## 2024-11-28 RX ADMIN — ACETAMINOPHEN 650 MG: 325 TABLET, FILM COATED ORAL at 10:11

## 2024-11-28 RX ADMIN — INSULIN ASPART 2 UNITS: 100 INJECTION, SOLUTION INTRAVENOUS; SUBCUTANEOUS at 05:11

## 2024-11-28 RX ADMIN — INSULIN ASPART 5 UNITS: 100 INJECTION, SOLUTION INTRAVENOUS; SUBCUTANEOUS at 12:11

## 2024-11-28 RX ADMIN — DOCUSATE SODIUM 100 MG: 100 CAPSULE, LIQUID FILLED ORAL at 08:11

## 2024-11-28 RX ADMIN — DOCUSATE SODIUM 100 MG: 100 CAPSULE, LIQUID FILLED ORAL at 09:11

## 2024-11-28 RX ADMIN — INSULIN ASPART 4 UNITS: 100 INJECTION, SOLUTION INTRAVENOUS; SUBCUTANEOUS at 09:11

## 2024-11-28 RX ADMIN — OXYCODONE HYDROCHLORIDE 5 MG: 5 TABLET ORAL at 04:11

## 2024-11-28 RX ADMIN — METHOCARBAMOL 500 MG: 500 TABLET ORAL at 05:11

## 2024-11-28 RX ADMIN — GABAPENTIN 300 MG: 300 CAPSULE ORAL at 09:11

## 2024-11-28 RX ADMIN — POLYETHYLENE GLYCOL 3350 17 G: 17 POWDER, FOR SOLUTION ORAL at 09:11

## 2024-11-28 RX ADMIN — ACETAMINOPHEN 650 MG: 325 TABLET, FILM COATED ORAL at 05:11

## 2024-11-28 RX ADMIN — GABAPENTIN 300 MG: 300 CAPSULE ORAL at 01:11

## 2024-11-28 RX ADMIN — ACETAMINOPHEN 650 MG: 325 TABLET, FILM COATED ORAL at 08:11

## 2024-11-28 RX ADMIN — ACETAMINOPHEN 650 MG: 325 TABLET, FILM COATED ORAL at 01:11

## 2024-11-28 RX ADMIN — ENOXAPARIN SODIUM 40 MG: 40 INJECTION SUBCUTANEOUS at 08:11

## 2024-11-28 NOTE — PROGRESS NOTES
"Patient Name: Gabriela Lozano  MRN: [id number]  Admission Date: 11/26/2024  Hospital Length of Stay: 2 days  Attending Provider: Shiv Moreno Jr., *  Primary Care Provider: Monica Humphreys MD  Follow-up For: Procedure(s) (LRB):  INSERTION, INTRAMEDULLARY MARCOS, FEMUR, DISTAL, RETROGRADE (Left)    Post-Operative Day: 1 Day Post-Op  Subjective:       Principal Orthopedic Problem: 1 Day Post-Op       Interval History:  No acute issues overnight.  Tolerated surgery very well.  Resting comfortably.    Review of patient's allergies indicates:  No Known Allergies    Current Facility-Administered Medications   Medication    0.9%  NaCl infusion (for blood administration)    acetaminophen tablet 650 mg    dextrose 10% bolus 125 mL 125 mL    dextrose 10% bolus 250 mL 250 mL    docusate sodium capsule 100 mg    enoxaparin injection 40 mg    gabapentin capsule 300 mg    glucagon (human recombinant) injection 1 mg    insulin aspart U-100 injection 0-5 Units    magnesium hydroxide 400 mg/5 ml suspension 2,400 mg    melatonin tablet 6 mg    methocarbamoL tablet 500 mg    morphine injection 2 mg    oxyCODONE immediate release tablet 5 mg    oxyCODONE immediate release tablet Tab 10 mg    polyethylene glycol packet 17 g     Objective:     Vital Signs (Most Recent):  Temp: 98.3 °F (36.8 °C) (11/28/24 0429)  Pulse: 92 (11/28/24 0429)  Resp: 16 (11/28/24 0429)  BP: (!) 111/50 (11/28/24 0429)  SpO2: (!) 91 % (11/28/24 0429) Vital Signs (24h Range):  Temp:  [97 °F (36.1 °C)-98.3 °F (36.8 °C)] 98.3 °F (36.8 °C)  Pulse:  [] 92  Resp:  [11-18] 16  SpO2:  [91 %-100 %] 91 %  BP: (103-184)/(43-96) 111/50     Weight: 84.4 kg (186 lb)  Height: 4' 11" (149.9 cm)  Body mass index is 37.57 kg/m².      Intake/Output Summary (Last 24 hours) at 11/28/2024 0718  Last data filed at 11/28/2024 0600  Gross per 24 hour   Intake 240 ml   Output 100 ml   Net 140 ml       Physical Exam:   Ortho/SPM Exam    General the patient is alert and in " no acute distress nontoxic-appearing appropriate affect.    Constitutional: Vital signs are examined and stable.  Resp: No signs of labored breathing    Musculoskeletal Exam:  Left lower extremity:  Dressings clean and dry and intact tolerates gentle passive circumduction of the hip.  Tolerates gentle passive range motion of the knee.  Lower limb compartments are soft and compressible.  2+ DP pulse.  Good range motion of the ankle and digits actively.    Diagnostic Findings:   Significant Labs: BMP:   Recent Labs   Lab 11/28/24 0419   *   K 5.2*      CO2 24   BUN 37.2*   CREATININE 1.50*   CALCIUM 9.0   MG 2.00     CBC:   Recent Labs   Lab 11/26/24 2035 11/27/24 0418 11/28/24  0515   WBC 9.61 8.47 7.80   HGB 9.7* 8.4* 5.8*   HCT 30.8* 27.0* 18.1*    192 160     CMP:   Recent Labs   Lab 11/26/24 2035 11/27/24 0418 11/28/24 0419    138 135*   K 4.9 4.9 5.2*   * 108* 104   CO2 24 22* 24   BUN 31.1* 37.4* 37.2*   CREATININE 1.19* 1.15* 1.50*   CALCIUM 9.5 8.7 9.0   ALBUMIN 3.4 3.0* 3.1*   BILITOT 0.2 0.3 0.3   ALKPHOS 49 36* 30*   AST 18 14 15   ALT 12 11 6     All pertinent labs within the past 24 hours have been reviewed.        Significant Imaging: I have reviewed and interpreted all pertinent imaging results/findings.     Assessment/Plan:     Active Diagnoses:    Diagnosis Date Noted POA    PRINCIPAL PROBLEM:  Unspecified fracture of lower end of left femur, initial encounter for closed fracture [S72.402A] 11/26/2024 Yes      Problems Resolved During this Admission:     H/H is down as expected due to acute blood loss anemia following surgery.  Defer transfusion to primary team.  Plan to allow her to weight bear as tolerated to left lower extremity, full range motion.  Begin dressing changes tomorrow.  Patient will need case management evaluation for inpatient rehab admission versus skilled nursing facility.    This note/OR report was created with the assistance of  voice  recognition software or phone  dictation.  There may be transcription errors as a result of using this technology however minimal. Effort has been made to assure accuracy of transcription but any obvious errors or omissions should be clarified with the author of the document.         Godwin Teixeira MD  Orthopedic Trauma Surgery  [facility] - 8th Floor Med Surg

## 2024-11-28 NOTE — PROGRESS NOTES
Trauma Surgery   Progress Note  Patient Name: Gabriela Lozano                   : 1945     MRN: [id number]   Date of Admission: 2024  Code Status: Full Code  Date of Exam: 2024  HD#2  POD#1 Day Post-Op  Attending Provider: Shiv Moreno Jr., *    Subjective:   Interval history:  NAEON  AF   SBP soft  Intermittent tachycardia  Pale this AM   H/H down, will transfuse     Home Meds:   Current Outpatient Medications   Medication Instructions    atorvastatin (LIPITOR) 40 mg, Oral, Daily    carvediloL (COREG) 6.25 mg, 2 times daily    clopidogreL (PLAVIX) 75 mg, Oral, Daily    gabapentin (NEURONTIN) 600 mg, 2 times daily    insulin glargine U-100 (Lantus) 30 Units, Subcutaneous, Daily    isosorbide mononitrate (IMDUR) 30 mg, Daily    lisinopriL (PRINIVIL,ZESTRIL) 20 mg, Oral, Daily    metFORMIN (GLUCOPHAGE) 1,000 mg, Oral, 2 times daily    traMADoL (ULTRAM) 50 mg, Oral, Daily PRN      Scheduled Meds:   acetaminophen  650 mg Oral Q4H    docusate sodium  100 mg Oral BID    enoxparin  40 mg Subcutaneous Q12H    gabapentin  300 mg Oral TID    methocarbamoL  500 mg Oral Q8H    polyethylene glycol  17 g Oral BID     Continuous Infusions:  PRN Meds:  Current Facility-Administered Medications:     0.9%  NaCl infusion (for blood administration), , Intravenous, Q24H PRN    dextrose 10%, 12.5 g, Intravenous, PRN    dextrose 10%, 25 g, Intravenous, PRN    glucagon (human recombinant), 1 mg, Intramuscular, PRN    haloperidol lactate, 0.5 mg, Intravenous, Q10 Min PRN    HYDROmorphone, 0.4 mg, Intravenous, Q5 Min PRN    insulin aspart U-100, 0-5 Units, Subcutaneous, Q6H PRN    magnesium hydroxide 400 mg/5 ml, 30 mL, Oral, Daily PRN    melatonin, 6 mg, Oral, Nightly PRN    meperidine, 6.25 mg, Intravenous, Once PRN    midazolam, 2 mg, Intravenous, Once PRN    morphine, 2 mg, Intravenous, Q4H PRN    oxyCODONE, 5 mg, Oral, Q4H PRN    oxyCODONE, 10 mg, Oral, Q4H PRN    prochlorperazine, 5 mg, Intravenous, Q30  "Min PRN     Objective:     VITAL SIGNS: 24 HR MIN & MAX LAST   Temp  Min: 97 °F (36.1 °C)  Max: 98.3 °F (36.8 °C)  98.3 °F (36.8 °C)   BP  Min: 103/63  Max: 184/55  (!) 111/50    Pulse  Min: 69  Max: 107  92    Resp  Min: 11  Max: 18  16    SpO2  Min: 91 %  Max: 100 %  (!) 91 %      HT: 4' 11" (149.9 cm)  WT: 84.4 kg (186 lb)  BMI: 37.5     Intake/output:  Intake/Output - Last 3 Shifts         11/26 0700 11/27 0659 11/27 0700 11/28 0659    P.O. 200 240    Total Intake(mL/kg) 200 (2.4) 240 (2.8)    Urine (mL/kg/hr)  100 (0)    Total Output  100    Net +200 +140          Urine Occurrence  2 x            Intake/Output Summary (Last 24 hours) at 11/28/2024 0659  Last data filed at 11/28/2024 0600  Gross per 24 hour   Intake 240 ml   Output 100 ml   Net 140 ml         Lines/drains/airway:       Peripheral IV - Single Lumen 11/26/24 2130 20 G Anterior;Distal;Left Forearm (Active)   Site Assessment Clean;Dry;Intact;No redness;No swelling 11/28/24 0400   Line Securement Device Secured with sutureless device 11/27/24 2000   Extremity Assessment Distal to IV No abnormal discoloration;No redness;No swelling;No warmth 11/27/24 2000   Line Status Capped 11/28/24 0400   Dressing Status Clean;Dry;Intact 11/28/24 0400   Dressing Intervention Integrity maintained 11/28/24 0400   Number of days: 1       Physical examination:  Gen: NAD, AAOx3, answering questions appropriately  HEENT: NC  CV: RR  Resp: NWOB  Abd: S/NT/ND  Msk: moving all extremities spontaneously and purposefully. LLE dressing c/d/i  Neuro: CN II-XII grossly intact, GCS 15    Labs:  Renal:  Recent Labs     11/26/24 2035 11/27/24 0418 11/28/24 0419   BUN 31.1* 37.4* 37.2*   CREATININE 1.19* 1.15* 1.50*     No results for input(s): "LACTIC" in the last 72 hours.  FEN/GI:  Recent Labs     11/26/24 2035 11/27/24 0418 11/28/24 0419    138 135*   K 4.9 4.9 5.2*   * 108* 104   CO2 24 22* 24   CALCIUM 9.5 8.7 9.0   MG  --  0.90* 2.00   PHOS  --  3.1 4.4 " "  ALBUMIN 3.4 3.0* 3.1*   BILITOT 0.2 0.3 0.3   AST 18 14 15   ALKPHOS 49 36* 30*   ALT 12 11 6     Heme:  Recent Labs     11/26/24 2035 11/27/24 0418 11/28/24  0515   HGB 9.7* 8.4* 5.8*   HCT 30.8* 27.0* 18.1*    192 160     ID:  Recent Labs     11/26/24 2035 11/27/24 0418 11/28/24  0515   WBC 9.61 8.47 7.80     CBG:  Recent Labs     11/26/24 2035 11/27/24 0418 11/28/24  0419   GLUCOSE 210* 105 222*      Recent Labs     11/27/24 0018 11/27/24  0923 11/28/24 0018 11/28/24  0545   POCTGLUCOSE 156* 77 319* 224*      Cardiovascular:  No results for input(s): "TROPONINI", "CKTOTAL", "CKMB", "BNP" in the last 168 hours.  I have reviewed all pertinent lab results within the past 24 hours.    Imaging:  SURG FL Surgery Fluoro Usage   Final Result      X-Ray Femur 2 View Left   Final Result      As above.         Electronically signed by: Dvaid Lomeli   Date:    11/27/2024   Time:    12:36      CT Knee Without Contrast Left   Final Result      Distal femur fracture is described above         Electronically signed by: Jose Solis MD   Date:    11/26/2024   Time:    21:57      X-Ray Knee 3 View Left   Final Result      Displaced distal femur fracture.         Electronically signed by: Jose Solis MD   Date:    11/26/2024   Time:    21:30      X-Ray Hip 2 or 3 views Left with Pelvis when performed   Final Result      No acute abnormalities are seen         Electronically signed by: Jose Solis MD   Date:    11/26/2024   Time:    21:31      X-Ray Chest AP Portable   Final Result      No acute disease is seen         Electronically signed by: Jose Solis MD   Date:    11/26/2024   Time:    21:22         I have reviewed all pertinent imaging results/findings within the past 24 hours.    Micro/Path/Other:  Microbiology Results (last 7 days)       ** No results found for the last 168 hours. **           Pathology Results  (Last 7 days)      None             Problems list:  Active Problem List with Overview " Notes    Diagnosis Date Noted    Unspecified fracture of lower end of left femur, initial encounter for closed fracture 11/26/2024      Active Diagnoses:    Diagnosis Date Noted POA    PRINCIPAL PROBLEM:  Unspecified fracture of lower end of left femur, initial encounter for closed fracture [S72.402A] 11/26/2024 Yes      Problems Resolved During this Admission:       Assessment & Plan:   L femur fx s/p IMN    - Consults: Orthopedic surgery  - Regular diet  - mIVF   - Daily labs  - trend BUN, Cr, H/H  - transfuse 2 U PRBC   - obtain post transfusion CBC  - SSI  - MM pain control  - IS  - Physical Therapy  Occupational Therapy  - Prophylactic Lovenox 40mg BID   - Precautions: Fall  - Disposition: Pending progress with therapy  Pending clinical progress  - Outpatient follow up: PCP and Orthopedic surgery

## 2024-11-28 NOTE — PT/OT/SLP PROGRESS
Physical Therapy      Patient Name:  Gabriela Lozano   MRN:  [id number]    Patient not seen today secondary to low H&H . Will follow-up as schedule allows.    11/28/24

## 2024-11-29 LAB
ALBUMIN SERPL-MCNC: 3.1 G/DL (ref 3.4–4.8)
ALBUMIN/GLOB SERPL: 1.4 RATIO (ref 1.1–2)
ALP SERPL-CCNC: 40 UNIT/L (ref 40–150)
ALT SERPL-CCNC: <5 UNIT/L (ref 0–55)
ANION GAP SERPL CALC-SCNC: 9 MEQ/L
AST SERPL-CCNC: 22 UNIT/L (ref 5–34)
BASOPHILS # BLD AUTO: 0.03 X10(3)/MCL
BASOPHILS NFR BLD AUTO: 0.3 %
BILIRUB SERPL-MCNC: 0.5 MG/DL
BUN SERPL-MCNC: 39 MG/DL (ref 9.8–20.1)
CALCIUM SERPL-MCNC: 9.2 MG/DL (ref 8.4–10.2)
CHLORIDE SERPL-SCNC: 107 MMOL/L (ref 98–107)
CO2 SERPL-SCNC: 19 MMOL/L (ref 23–31)
CREAT SERPL-MCNC: 1.33 MG/DL (ref 0.55–1.02)
CREAT/UREA NIT SERPL: 29
EOSINOPHIL # BLD AUTO: 0.37 X10(3)/MCL (ref 0–0.9)
EOSINOPHIL NFR BLD AUTO: 3.6 %
ERYTHROCYTE [DISTWIDTH] IN BLOOD BY AUTOMATED COUNT: 15.6 % (ref 11.5–17)
GFR SERPLBLD CREATININE-BSD FMLA CKD-EPI: 41 ML/MIN/1.73/M2
GLOBULIN SER-MCNC: 2.2 GM/DL (ref 2.4–3.5)
GLUCOSE SERPL-MCNC: 224 MG/DL (ref 82–115)
HCT VFR BLD AUTO: 28.4 % (ref 37–47)
HGB BLD-MCNC: 9.3 G/DL (ref 12–16)
IMM GRANULOCYTES # BLD AUTO: 0.05 X10(3)/MCL (ref 0–0.04)
IMM GRANULOCYTES NFR BLD AUTO: 0.5 %
LYMPHOCYTES # BLD AUTO: 2.3 X10(3)/MCL (ref 0.6–4.6)
LYMPHOCYTES NFR BLD AUTO: 22.6 %
MAGNESIUM SERPL-MCNC: 1.7 MG/DL (ref 1.6–2.6)
MCH RBC QN AUTO: 29.2 PG (ref 27–31)
MCHC RBC AUTO-ENTMCNC: 32.7 G/DL (ref 33–36)
MCV RBC AUTO: 89.3 FL (ref 80–94)
MONOCYTES # BLD AUTO: 0.95 X10(3)/MCL (ref 0.1–1.3)
MONOCYTES NFR BLD AUTO: 9.3 %
NEUTROPHILS # BLD AUTO: 6.47 X10(3)/MCL (ref 2.1–9.2)
NEUTROPHILS NFR BLD AUTO: 63.7 %
NRBC BLD AUTO-RTO: 0.3 %
PHOSPHATE SERPL-MCNC: 3.2 MG/DL (ref 2.3–4.7)
PLATELET # BLD AUTO: 142 X10(3)/MCL (ref 130–400)
PMV BLD AUTO: 10.3 FL (ref 7.4–10.4)
POCT GLUCOSE: 239 MG/DL (ref 70–110)
POCT GLUCOSE: 264 MG/DL (ref 70–110)
POCT GLUCOSE: 264 MG/DL (ref 70–110)
POCT GLUCOSE: 290 MG/DL (ref 70–110)
POTASSIUM SERPL-SCNC: 5.2 MMOL/L (ref 3.5–5.1)
PROT SERPL-MCNC: 5.3 GM/DL (ref 5.8–7.6)
RBC # BLD AUTO: 3.18 X10(6)/MCL (ref 4.2–5.4)
SODIUM SERPL-SCNC: 135 MMOL/L (ref 136–145)
WBC # BLD AUTO: 10.17 X10(3)/MCL (ref 4.5–11.5)

## 2024-11-29 PROCEDURE — 80053 COMPREHEN METABOLIC PANEL: CPT | Performed by: NURSE PRACTITIONER

## 2024-11-29 PROCEDURE — 63600175 PHARM REV CODE 636 W HCPCS

## 2024-11-29 PROCEDURE — 84100 ASSAY OF PHOSPHORUS: CPT

## 2024-11-29 PROCEDURE — 97166 OT EVAL MOD COMPLEX 45 MIN: CPT

## 2024-11-29 PROCEDURE — 94799 UNLISTED PULMONARY SVC/PX: CPT

## 2024-11-29 PROCEDURE — 97162 PT EVAL MOD COMPLEX 30 MIN: CPT

## 2024-11-29 PROCEDURE — 85025 COMPLETE CBC W/AUTO DIFF WBC: CPT | Performed by: NURSE PRACTITIONER

## 2024-11-29 PROCEDURE — 83735 ASSAY OF MAGNESIUM: CPT

## 2024-11-29 PROCEDURE — 11000001 HC ACUTE MED/SURG PRIVATE ROOM

## 2024-11-29 PROCEDURE — 36415 COLL VENOUS BLD VENIPUNCTURE: CPT | Performed by: NURSE PRACTITIONER

## 2024-11-29 PROCEDURE — 99900031 HC PATIENT EDUCATION (STAT)

## 2024-11-29 PROCEDURE — 99233 SBSQ HOSP IP/OBS HIGH 50: CPT | Mod: ,,, | Performed by: SURGERY

## 2024-11-29 PROCEDURE — 63600175 PHARM REV CODE 636 W HCPCS: Performed by: NURSE PRACTITIONER

## 2024-11-29 PROCEDURE — 25000003 PHARM REV CODE 250: Performed by: NURSE PRACTITIONER

## 2024-11-29 RX ORDER — INSULIN GLARGINE 100 [IU]/ML
5 INJECTION, SOLUTION SUBCUTANEOUS NIGHTLY
Status: DISCONTINUED | OUTPATIENT
Start: 2024-11-29 | End: 2024-12-02

## 2024-11-29 RX ADMIN — INSULIN ASPART 2 UNITS: 100 INJECTION, SOLUTION INTRAVENOUS; SUBCUTANEOUS at 01:11

## 2024-11-29 RX ADMIN — DOCUSATE SODIUM 100 MG: 100 CAPSULE, LIQUID FILLED ORAL at 09:11

## 2024-11-29 RX ADMIN — METHOCARBAMOL 500 MG: 500 TABLET ORAL at 06:11

## 2024-11-29 RX ADMIN — ENOXAPARIN SODIUM 40 MG: 40 INJECTION SUBCUTANEOUS at 09:11

## 2024-11-29 RX ADMIN — OXYCODONE HYDROCHLORIDE 5 MG: 5 TABLET ORAL at 06:11

## 2024-11-29 RX ADMIN — INSULIN ASPART 3 UNITS: 100 INJECTION, SOLUTION INTRAVENOUS; SUBCUTANEOUS at 06:11

## 2024-11-29 RX ADMIN — ACETAMINOPHEN 650 MG: 325 TABLET, FILM COATED ORAL at 06:11

## 2024-11-29 RX ADMIN — OXYCODONE HYDROCHLORIDE 10 MG: 10 TABLET ORAL at 01:11

## 2024-11-29 RX ADMIN — INSULIN ASPART 2 UNITS: 100 INJECTION, SOLUTION INTRAVENOUS; SUBCUTANEOUS at 06:11

## 2024-11-29 RX ADMIN — ACETAMINOPHEN 650 MG: 325 TABLET, FILM COATED ORAL at 10:11

## 2024-11-29 RX ADMIN — GABAPENTIN 300 MG: 300 CAPSULE ORAL at 09:11

## 2024-11-29 RX ADMIN — GABAPENTIN 300 MG: 300 CAPSULE ORAL at 10:11

## 2024-11-29 RX ADMIN — POLYETHYLENE GLYCOL 3350 17 G: 17 POWDER, FOR SOLUTION ORAL at 10:11

## 2024-11-29 RX ADMIN — POLYETHYLENE GLYCOL 3350 17 G: 17 POWDER, FOR SOLUTION ORAL at 09:11

## 2024-11-29 RX ADMIN — INSULIN ASPART 1 UNITS: 100 INJECTION, SOLUTION INTRAVENOUS; SUBCUTANEOUS at 11:11

## 2024-11-29 RX ADMIN — METHOCARBAMOL 500 MG: 500 TABLET ORAL at 10:11

## 2024-11-29 RX ADMIN — METHOCARBAMOL 500 MG: 500 TABLET ORAL at 01:11

## 2024-11-29 RX ADMIN — GABAPENTIN 300 MG: 300 CAPSULE ORAL at 04:11

## 2024-11-29 RX ADMIN — MORPHINE SULFATE 2 MG: 4 INJECTION INTRAVENOUS at 10:11

## 2024-11-29 RX ADMIN — ENOXAPARIN SODIUM 40 MG: 40 INJECTION SUBCUTANEOUS at 10:11

## 2024-11-29 RX ADMIN — INSULIN GLARGINE 5 UNITS: 100 INJECTION, SOLUTION SUBCUTANEOUS at 10:11

## 2024-11-29 RX ADMIN — DOCUSATE SODIUM 100 MG: 100 CAPSULE, LIQUID FILLED ORAL at 10:11

## 2024-11-29 NOTE — PROGRESS NOTES
Trauma Surgery   Progress Note  Patient Name: Gabriela Lozano                   : 1945     MRN: [id number]   Date of Admission: 2024  Code Status: Full Code  Date of Exam: 2024  HD#3  POD#2 Days Post-Op  Attending Provider: Shiv Moreno Jr., *    Subjective:   Interval history:  NAEON  AFVSS, mild tachycardia  H&H stable at 9.3/28.4  Pain controlled with meds  Tolerating diabetic diet    Home Meds:   Current Outpatient Medications   Medication Instructions    atorvastatin (LIPITOR) 40 mg, Oral, Daily    carvediloL (COREG) 6.25 mg, 2 times daily    clopidogreL (PLAVIX) 75 mg, Oral, Daily    gabapentin (NEURONTIN) 600 mg, 2 times daily    insulin glargine U-100 (Lantus) 30 Units, Subcutaneous, Daily    isosorbide mononitrate (IMDUR) 30 mg, Daily    lisinopriL (PRINIVIL,ZESTRIL) 20 mg, Oral, Daily    metFORMIN (GLUCOPHAGE) 1,000 mg, Oral, 2 times daily    traMADoL (ULTRAM) 50 mg, Oral, Daily PRN      Scheduled Meds:   acetaminophen  650 mg Oral Q4H    docusate sodium  100 mg Oral BID    enoxparin  40 mg Subcutaneous Q12H    gabapentin  300 mg Oral TID    methocarbamoL  500 mg Oral Q8H    polyethylene glycol  17 g Oral BID     Continuous Infusions:  PRN Meds:  Current Facility-Administered Medications:     0.9%  NaCl infusion (for blood administration), , Intravenous, Q24H PRN    dextrose 10%, 12.5 g, Intravenous, PRN    dextrose 10%, 25 g, Intravenous, PRN    glucagon (human recombinant), 1 mg, Intramuscular, PRN    insulin aspart U-100, 0-5 Units, Subcutaneous, Q6H PRN    magnesium hydroxide 400 mg/5 ml, 30 mL, Oral, Daily PRN    melatonin, 6 mg, Oral, Nightly PRN    morphine, 2 mg, Intravenous, Q4H PRN    oxyCODONE, 5 mg, Oral, Q4H PRN    oxyCODONE, 10 mg, Oral, Q4H PRN     Objective:     VITAL SIGNS: 24 HR MIN & MAX LAST   Temp  Min: 98 °F (36.7 °C)  Max: 99.1 °F (37.3 °C)  99 °F (37.2 °C)   BP  Min: 106/40  Max: 144/70  (!) 144/70    Pulse  Min: 96  Max: 106  105    Resp  Min: 16   "Max: 20  18    SpO2  Min: 94 %  Max: 97 %  97 %      HT: 4' 11" (149.9 cm)  WT: 84.4 kg (186 lb)  BMI: 37.5     Intake/output:  Intake/Output - Last 3 Shifts         11/27 0700 11/28 0659 11/28 0700 11/29 0659 11/29 0700 11/30 0659    P.O. 240 720 240    Blood  635.4     Total Intake(mL/kg) 240 (2.8) 1355.4 (16.1) 240 (2.8)    Urine (mL/kg/hr) 100 (0) 600 (0.3) 500 (1.4)    Total Output 100 600 500    Net +140 +755.4 -260           Urine Occurrence 2 x              Intake/Output Summary (Last 24 hours) at 11/29/2024 1116  Last data filed at 11/29/2024 0746  Gross per 24 hour   Intake 1295.42 ml   Output 1100 ml   Net 195.42 ml         Lines/drains/airway:       Peripheral IV - Single Lumen 11/26/24 2130 20 G Anterior;Distal;Left Forearm (Active)   Site Assessment Clean;Dry;Intact;No redness;No swelling 11/28/24 0400   Line Securement Device Secured with sutureless device 11/27/24 2000   Extremity Assessment Distal to IV No abnormal discoloration;No redness;No swelling;No warmth 11/27/24 2000   Line Status Capped 11/28/24 0400   Dressing Status Clean;Dry;Intact 11/28/24 0400   Dressing Intervention Integrity maintained 11/28/24 0400   Number of days: 1       Physical examination:  Gen: NAD, AAOx3, answering questions appropriately  HEENT: Normocephalic, atraumatic  CV: RR  Resp: NWOB  Abd: S/NT/ND  Msk: moving all extremities spontaneously and purposefully. LLE dressing c/d/i  Neuro: CN II-XII grossly intact, GCS 15    Labs:  Renal:  Recent Labs     11/26/24 2035 11/27/24 0418 11/28/24 0419 11/29/24 0407   BUN 31.1* 37.4* 37.2* 39.0*   CREATININE 1.19* 1.15* 1.50* 1.33*     No results for input(s): "LACTIC" in the last 72 hours.  FEN/GI:  Recent Labs     11/26/24 2035 11/27/24 0418 11/28/24 0419 11/29/24 0407    138 135* 135*   K 4.9 4.9 5.2* 5.2*   * 108* 104 107   CO2 24 22* 24 19*   CALCIUM 9.5 8.7 9.0 9.2   MG  --  0.90* 2.00 1.70   PHOS  --  3.1 4.4 3.2   ALBUMIN 3.4 3.0* 3.1* 3.1* " "  BILITOT 0.2 0.3 0.3 0.5   AST 18 14 15 22   ALKPHOS 49 36* 30* 40   ALT 12 11 6 <5     Heme:  Recent Labs     11/27/24 0418 11/28/24  0515 11/28/24  1455 11/29/24  0407   HGB 8.4* 5.8* 8.7* 9.3*   HCT 27.0* 18.1* 26.2* 28.4*    160 135 142     ID:  Recent Labs     11/27/24 0418 11/28/24  0515 11/28/24  1455 11/29/24  0407   WBC 8.47 7.80 8.51 10.17     CBG:  Recent Labs     11/26/24 2035 11/27/24  0418 11/28/24  0419 11/29/24  0407   GLUCOSE 210* 105 222* 224*      Recent Labs     11/27/24  0018 11/27/24  0923 11/28/24  0018 11/28/24  0545 11/28/24  1027 11/28/24  2115 11/29/24  0609   POCTGLUCOSE 156* 77 319* 224* 286* 311* 239*      Cardiovascular:  No results for input(s): "TROPONINI", "CKTOTAL", "CKMB", "BNP" in the last 168 hours.  I have reviewed all pertinent lab results within the past 24 hours.    Imaging:  SURG FL Surgery Fluoro Usage   Final Result      X-Ray Femur 2 View Left   Final Result      As above.         Electronically signed by: David Lomeli   Date:    11/27/2024   Time:    12:36      CT Knee Without Contrast Left   Final Result      Distal femur fracture is described above         Electronically signed by: Jose Solis MD   Date:    11/26/2024   Time:    21:57      X-Ray Knee 3 View Left   Final Result      Displaced distal femur fracture.         Electronically signed by: Jose Solis MD   Date:    11/26/2024   Time:    21:30      X-Ray Hip 2 or 3 views Left with Pelvis when performed   Final Result      No acute abnormalities are seen         Electronically signed by: Jose Solis MD   Date:    11/26/2024   Time:    21:31      X-Ray Chest AP Portable   Final Result      No acute disease is seen         Electronically signed by: Jose Solis MD   Date:    11/26/2024   Time:    21:22         I have reviewed all pertinent imaging results/findings within the past 24 hours.    Micro/Path/Other:  Microbiology Results (last 7 days)       ** No results found for the last 168 hours. " **           Pathology Results  (Last 7 days)      None             Problems list:  Active Problem List with Overview Notes    Diagnosis Date Noted    Unspecified fracture of lower end of left femur, initial encounter for closed fracture 11/26/2024      Active Diagnoses:    Diagnosis Date Noted POA    PRINCIPAL PROBLEM:  Unspecified fracture of lower end of left femur, initial encounter for closed fracture [S72.402A] 11/26/2024 Yes      Problems Resolved During this Admission:       Assessment & Plan:   Left femur fracture  -Ortho following  -s/p IMN  -MMPC  -PT/OT  -Lovenox  -NV checks q4h    Fall  -MMPC  -Regular diet  -Daily labs  -SSI  -IS    Ankita Call P  Trauma Surgery

## 2024-11-29 NOTE — PROGRESS NOTES
"[facility] - 8th Floor Med Surg  Orthopedics  Progress Note    Patient Name: Gabriela Lozano  MRN: [id number]  Admission Date: 11/26/2024  Hospital Length of Stay: 3 days  Attending Provider: Shiv Moreno Jr., *  Primary Care Provider: Monica Humphreys MD    Subjective:     Principal Problem:Unspecified fracture of lower end of left femur, initial encounter for closed fracture    Principal Orthopedic Problem: 2 Days Post-Op   IMN L distal femur fracture    Interval History: Seen this am.  used. Dressings changed this am. States she has back pain with radiating left leg pain and she is nervous now that she will be limited. Discussed possible placement if needed. Questions answered.     Review of patient's allergies indicates:  No Known Allergies    Current Facility-Administered Medications   Medication    0.9%  NaCl infusion (for blood administration)    acetaminophen tablet 650 mg    dextrose 10% bolus 125 mL 125 mL    dextrose 10% bolus 250 mL 250 mL    docusate sodium capsule 100 mg    enoxaparin injection 40 mg    gabapentin capsule 300 mg    glucagon (human recombinant) injection 1 mg    insulin aspart U-100 injection 0-5 Units    magnesium hydroxide 400 mg/5 ml suspension 2,400 mg    melatonin tablet 6 mg    methocarbamoL tablet 500 mg    morphine injection 2 mg    oxyCODONE immediate release tablet 5 mg    oxyCODONE immediate release tablet Tab 10 mg    polyethylene glycol packet 17 g     Objective:     Vital Signs (Most Recent):  Temp: 98 °F (36.7 °C) (11/29/24 0730)  Pulse: 102 (11/29/24 0730)  Resp: 18 (11/29/24 0730)  BP: 125/77 (11/29/24 0730)  SpO2: 95 % (11/29/24 0730) Vital Signs (24h Range):  Temp:  [98 °F (36.7 °C)-99.1 °F (37.3 °C)] 98 °F (36.7 °C)  Pulse:  [] 102  Resp:  [16-20] 18  SpO2:  [94 %-97 %] 95 %  BP: (106-150)/(39-99) 125/77     Weight: 84.4 kg (186 lb)  Height: 4' 11" (149.9 cm)  Body mass index is 37.57 kg/m².      Intake/Output Summary (Last " 24 hours) at 11/29/2024 0842  Last data filed at 11/29/2024 0746  Gross per 24 hour   Intake 1595.42 ml   Output 1100 ml   Net 495.42 ml       Physical Exam:   General the patient is alert and in no acute distress;  nontoxic-appearing appropriate affect.    Constitutional: Vital signs are examined and stable.  Resp: No signs of labored breathing               LLE: -Skin: Dressings all removed, incisions well approxiamted, staples in tact.            -MSK: +EHL/FHL, + DF/PF           -Neuro:  Sensation intact to light touch throughout           -CV: Capillary refill is less than 2 seconds. +DP. Compartments soft and compressible      Diagnostic Findings:     Significant Labs: CBC:   Recent Labs   Lab 11/28/24  0515 11/28/24  1455 11/29/24  0407   WBC 7.80 8.51 10.17   HGB 5.8* 8.7* 9.3*   HCT 18.1* 26.2* 28.4*    135 142     All pertinent labs within the past 24 hours have been reviewed.    Significant Imaging: I have reviewed all pertinent imaging results/findings.     Assessment/Plan:     Active Diagnoses:    Diagnosis Date Noted POA    PRINCIPAL PROBLEM:  Unspecified fracture of lower end of left femur, initial encounter for closed fracture [S72.402A] 11/26/2024 Yes      Problems Resolved During this Admission:   POD #2 IMN L distal femur fracture    Diet: As tolerated  Pain: multimodal PRN  DVT: Lovenox;  OK for ASA 81mg BID on DC x30D  ABLA: expected post op; transfused - hgb stable this am 9.3  PT/OT: Eval and Treat  Activity: WBAT LLE full ROM  Ok to leave incisions open to air if dry; No creams/ointments   FU with Dr Teixeira in 2 weeks for staple removal     The above findings, diagnostics, and treatment plan were discussed with Dr Teixeira who is in agreement with the plan of care except as stated in additional documentation.      JOSE R Chang   Orthopedic Trauma Surgery  [facility]

## 2024-11-29 NOTE — PLAN OF CARE
Problem: Occupational Therapy  Goal: Occupational Therapy Goal  Description: Goals to be met by: 12/27/24     Patient will increase functional independence with ADLs by performing:    LE Dressing with Modified [place].  Grooming while standing with Modified [place].  Toileting from bedside commode with Modified [place] for hygiene and clothing management.   Toilet transfer to bedside commode with Modified [place]. Progress as appropriate.     Outcome: Progressing

## 2024-11-29 NOTE — PLAN OF CARE
Problem: Physical Therapy  Goal: Physical Therapy Goal  Description: Goals to be met by: 2024     Patient will increase functional independence with mobility by performin. Supine to sit with MInimal Assistance  2. Sit to supine with MInimal Assistance  3. Sit to stand transfer with Contact Guard Assistance  4. Gait  x 100 feet with Minimal Assistance using Rolling Walker.     Outcome: Progressing

## 2024-11-29 NOTE — PT/OT/SLP EVAL
Physical Therapy Evaluation    Patient Name:  Gabriela Lozano   MRN:  [id number]    Recommendations:     Discharge therapy intensity: Moderate Intensity Therapy   Discharge Equipment Recommendations: walker, rolling   Barriers to discharge: Impaired mobility and Ongoing medical needs    Assessment:     Gabriela Lozano is a 79 y.o. female admitted with a medical diagnosis of L femur fx s/p IMR . Upon eval,  She presents with the following impairments/functional limitations: weakness, gait instability, impaired endurance, impaired balance, impaired functional mobility, pain, decreased lower extremity function, decreased ROM, orthopedic precautions. Pt req Max A x2 for supine <> sit and mod A x2 for sit <> stand. Attempted weight shifting onto LLE; unable to WB fully through LLE 2/2 pain. Unable to take side steps on this date. Prior to admit, pt lived alone and was Mod I using either a cane or walker due to history of low back pain. Recommending moderate intensity therapy at this time.     Of note:  Linda ([id number]) used for the session.       Rehab Prognosis: Good; patient would benefit from acute skilled PT services to address these deficits and reach maximum level of function.    Recent Surgery: Procedure(s) (LRB):  INSERTION, INTRAMEDULLARY MARCOS, FEMUR, DISTAL, RETROGRADE (Left) 2 Days Post-Op    Plan:     During this hospitalization, patient would benefit from acute PT services 6 x/week to address the identified rehab impairments via gait training, therapeutic activities, therapeutic exercises and progress toward the following goals:    Plan of Care Expires:  12/29/24    Subjective     Chief Complaint: LLE pain   Patient/Family Comments/goals: get better  Pain/Comfort:   Back and LLE pain with movement     Patients cultural, spiritual, Advent conflicts given the current situation: no    Living Environment:  Pt lives alone in an apartment; no MATEO.   Prior to admission, patients  level of function was Mod I .  Equipment used at home: walker, standard, cane, quad.  DME owned (not currently used): none.  Upon discharge, patient will have assistance from family; unclear how much they can assit.    Objective:     Communicated with RN prior to session.  Patient found HOB elevated with peripheral IV, pulse ox (continuous), telemetry  upon PT entry to room.    General Precautions: Standard, fall  Orthopedic Precautions:LLE weight bearing as tolerated   Braces: N/A  Respiratory Status: Room air  Blood Pressure: NT      Exams:  RLE ROM: WFL  RLE Strength: WFL  LLE ROM: limited by pain   LLE Strength: unable to test secondary to pain and post op limb         Functional Mobility:  Bed Mobility:     Supine to Sit: maximal assistance and of 2 persons  Sit to Supine: maximal assistance and of 2 persons  Transfers:     Sit to Stand:  moderate assistance and of 2 persons with rolling walker  Pre-Gait: Pt able to pivot LLE posterior x1. Attempted weight shifting and side stepping at EOB; but unable to tolerate WB through LLE.     AM-PAC 6 CLICK MOBILITY  Total Score:9       Treatment & Education:      Patient provided with verbal education education regarding PT role/goals/POC, post-op precautions, fall prevention, and safety awareness.  Understanding was verbalized, however additional teaching warranted.     Patient left HOB elevated with all lines intact, call button in reach, pressure relief boots, RN notified, and Ice to LLE .    GOALS:   Multidisciplinary Problems       Physical Therapy Goals          Problem: Physical Therapy    Goal Priority Disciplines Outcome Interventions   Physical Therapy Goal     PT, PT/OT Progressing    Description: Goals to be met by: 2024     Patient will increase functional independence with mobility by performin. Supine to sit with MInimal Assistance  2. Sit to supine with MInimal Assistance  3. Sit to stand transfer with Contact Guard Assistance  4. Gait  x  100 feet with Minimal Assistance using Rolling Walker.                          History:     Past Medical History:   Diagnosis Date    Diabetes mellitus     Hypertension        Past Surgical History:   Procedure Laterality Date    RETROGRADE INTRAMEDULLARY RODDING OF DISTAL FEMUR Left 11/27/2024    Procedure: INSERTION, INTRAMEDULLARY MARCOS, FEMUR, DISTAL, RETROGRADE;  Surgeon: Godwin Teixeira MD;  Location: [facility];  Service: Orthopedics;  Laterality: Left;       Time Tracking:     PT Received On: 11/29/24  PT Start Time: 0943     PT Stop Time: 1011  PT Total Time (min): 28 min     Billable Minutes: Evaluation Mod      11/29/2024

## 2024-11-29 NOTE — PT/OT/SLP EVAL
"Occupational Therapy  Evaluation    Name: Gabriela Lozano  MRN: [id number]  Admitting Diagnosis:   1. Closed displaced transverse fracture of shaft of left femur, initial encounter    2. Fall    3. Unspecified fracture of lower end of left femur, initial encounter for closed fracture        Recent Surgery: Procedure(s) (LRB):  INSERTION, INTRAMEDULLARY MARCOS, FEMUR, DISTAL, RETROGRADE (Left) 2 Days Post-Op    Recommendations:     Discharge therapy intensity: Moderate Intensity Therapy   Discharge Equipment Recommendations:  walker, rolling (other tbd)  Barriers to discharge:  Decreased caregiver support    Assessment:     Gabriela Lozano is a 79 y.o. female with a medical diagnosis of L femur fx s/p IMR.  She presents with the following performance deficits affecting function: weakness, impaired endurance, impaired self care skills, impaired functional mobility, decreased lower extremity function, pain.    At [facility], pt requires max assist x2 supine to sit, and mod assist x2 for safety sit to stand, u/a to pivot along EOB but attempted wt. Shifts briefly, pt lives alone and was previously mod I/I with BADL's, at this time recommend mod intensity therapy at d/c.       Rehab Prognosis: good ; patient would benefit from acute skilled OT services to address these deficits and reach maximum level of function.       Plan:     Patient to be seen 6 x/week to address the above listed problems via self-care/home management, therapeutic activities, therapeutic exercises  Plan of Care Expires: 12/27/24  Plan of Care Reviewed with: patient    Subjective     Chief Complaint: LLE pain  Patient/Family Comments/goals: "it hurts when I move"    Occupational Profile:  Living Environment: lives alone, has tub shower, grab bars  Previous level of function: mod I/I, difficulty in and out of shower   Roles and Routines: mom, mom in law  Equipment Used at Home: cane, quad, walker, standard  Assistance upon Discharge: some from " family, unclear how much    Pain/Comfort:  Pain Rating 1:  (not rated but c/o L leg pain anytime she tries to move)    Patients cultural, spiritual, Druze conflicts given the current situation:      Objective:     OT communicated with nsg and Pt prior to session.      Patient was found supine with telemetry, pressure relief boots upon OT entry to room.    General Precautions: Standard, fall  Orthopedic Precautions: LLE weight bearing as tolerated  Braces:      Vital Signs:     Bed Mobility:    Sup to sit with max assist x 2    Functional Mobility/Transfers:  Sit to stand with mod assist x 2 for safety  Functional Mobility: attempted wt. Shifts, u/a to progress LE's along EOB but able to bring LLE back    Activities of Daily Living:  Total assist with socks    AMPA 6 Click ADL:  [facility] Total Score:      Functional Cognition:  Follows commands appropriately    Visual Perceptual Skills:  intact    Upper Extremity Function:  Right Upper Extremity:   wfl    Left Upper Extremity:  wfl    Balance:   Sitting EOB with SBA  Standing with mod assist and rW        Patient Education:  Patient provided with verbal education education regarding OT role/goals/POC, post op precautions, fall prevention, safety awareness, Discharge/DME recommendations, and pressure ulcer prevention.  Understanding was verbalized.     Patient left HOB elevated with all lines intact, call button in reach, and bed alarm on.    GOALS:   Multidisciplinary Problems       Occupational Therapy Goals          Problem: Occupational Therapy    Goal Priority Disciplines Outcome Interventions   Occupational Therapy Goal     OT, PT/OT Progressing    Description: Goals to be met by: 12/27/24     Patient will increase functional independence with ADLs by performing:    LE Dressing with Modified [place].  Grooming while standing with Modified [place].  Toileting from bedside commode with Modified [place] for hygiene and clothing management.    Toilet transfer to bedside commode with Modified [place]. Progress as appropriate.                          History:     Past Medical History:   Diagnosis Date    Diabetes mellitus     Hypertension          Past Surgical History:   Procedure Laterality Date    RETROGRADE INTRAMEDULLARY RODDING OF DISTAL FEMUR Left 11/27/2024    Procedure: INSERTION, INTRAMEDULLARY MARCOS, FEMUR, DISTAL, RETROGRADE;  Surgeon: Godwin Teixeira MD;  Location: [facility];  Service: Orthopedics;  Laterality: Left;       Time Tracking:     OT Date of Treatment: 11/29/24  OT Start Time: 0943  OT Stop Time: 1015  OT Total Time (min): 32 min    Billable Minutes:Evaluation mod complexity    11/29/2024

## 2024-11-29 NOTE — PLAN OF CARE
[facility] [place] and  [facility].

## 2024-11-29 NOTE — PHYSICIAN QUERY
Please specify the diagnosis associated with the clinical findings.                          CHOOSE ALL THAT APPLY   Unspecified acute kidney failure/injury
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
## 2024-11-30 PROBLEM — Z79.4 TYPE 2 DIABETES MELLITUS, WITH LONG-TERM CURRENT USE OF INSULIN: Status: ACTIVE | Noted: 2024-11-30

## 2024-11-30 PROBLEM — D62 ACUTE BLOOD LOSS ANEMIA: Status: ACTIVE | Noted: 2024-11-30

## 2024-11-30 PROBLEM — E11.9 TYPE 2 DIABETES MELLITUS, WITH LONG-TERM CURRENT USE OF INSULIN: Status: ACTIVE | Noted: 2024-11-30

## 2024-11-30 LAB
ABO + RH BLD: NORMAL
ALBUMIN SERPL-MCNC: 2.6 G/DL (ref 3.4–4.8)
ALBUMIN/GLOB SERPL: 1.2 RATIO (ref 1.1–2)
ALP SERPL-CCNC: 41 UNIT/L (ref 40–150)
ALT SERPL-CCNC: <5 UNIT/L (ref 0–55)
ANION GAP SERPL CALC-SCNC: 9 MEQ/L
ANISOCYTOSIS BLD QL SMEAR: ABNORMAL
AST SERPL-CCNC: 16 UNIT/L (ref 5–34)
BASOPHILS # BLD AUTO: 0.02 X10(3)/MCL
BASOPHILS # BLD AUTO: 0.02 X10(3)/MCL
BASOPHILS NFR BLD AUTO: 0.3 %
BASOPHILS NFR BLD AUTO: 0.3 %
BILIRUB SERPL-MCNC: 0.5 MG/DL
BLD PROD TYP BPU: NORMAL
BLOOD UNIT EXPIRATION DATE: NORMAL
BLOOD UNIT TYPE CODE: 600
BUN SERPL-MCNC: 37.6 MG/DL (ref 9.8–20.1)
BURR CELLS (OLG): ABNORMAL
CALCIUM SERPL-MCNC: 8.3 MG/DL (ref 8.4–10.2)
CHLORIDE SERPL-SCNC: 101 MMOL/L (ref 98–107)
CO2 SERPL-SCNC: 23 MMOL/L (ref 23–31)
CREAT SERPL-MCNC: 1.21 MG/DL (ref 0.55–1.02)
CREAT/UREA NIT SERPL: 31
CROSSMATCH INTERPRETATION: NORMAL
DISPENSE STATUS: NORMAL
EOSINOPHIL # BLD AUTO: 0.26 X10(3)/MCL (ref 0–0.9)
EOSINOPHIL # BLD AUTO: 0.33 X10(3)/MCL (ref 0–0.9)
EOSINOPHIL NFR BLD AUTO: 3.5 %
EOSINOPHIL NFR BLD AUTO: 4.2 %
ERYTHROCYTE [DISTWIDTH] IN BLOOD BY AUTOMATED COUNT: 14.9 % (ref 11.5–17)
ERYTHROCYTE [DISTWIDTH] IN BLOOD BY AUTOMATED COUNT: 22.5 % (ref 11.5–17)
GFR SERPLBLD CREATININE-BSD FMLA CKD-EPI: 45 ML/MIN/1.73/M2
GLOBULIN SER-MCNC: 2.1 GM/DL (ref 2.4–3.5)
GLUCOSE SERPL-MCNC: 235 MG/DL (ref 82–115)
GROUP & RH: NORMAL
HCT VFR BLD AUTO: 22.5 % (ref 37–47)
HCT VFR BLD AUTO: 25.9 % (ref 37–47)
HGB BLD-MCNC: 7.3 G/DL (ref 12–16)
HGB BLD-MCNC: 8.3 G/DL (ref 12–16)
IMM GRANULOCYTES # BLD AUTO: 0.03 X10(3)/MCL (ref 0–0.04)
IMM GRANULOCYTES # BLD AUTO: 0.06 X10(3)/MCL (ref 0–0.04)
IMM GRANULOCYTES NFR BLD AUTO: 0.4 %
IMM GRANULOCYTES NFR BLD AUTO: 0.8 %
INDIRECT COOMBS: NORMAL
LYMPHOCYTES # BLD AUTO: 1.9 X10(3)/MCL (ref 0.6–4.6)
LYMPHOCYTES # BLD AUTO: 2.35 X10(3)/MCL (ref 0.6–4.6)
LYMPHOCYTES NFR BLD AUTO: 25.3 %
LYMPHOCYTES NFR BLD AUTO: 30 %
MACROCYTES BLD QL SMEAR: ABNORMAL
MAGNESIUM SERPL-MCNC: 1.4 MG/DL (ref 1.6–2.6)
MCH RBC QN AUTO: 26.5 PG (ref 27–31)
MCH RBC QN AUTO: 28.9 PG (ref 27–31)
MCHC RBC AUTO-ENTMCNC: 32 G/DL (ref 33–36)
MCHC RBC AUTO-ENTMCNC: 32.4 G/DL (ref 33–36)
MCV RBC AUTO: 82.7 FL (ref 80–94)
MCV RBC AUTO: 88.9 FL (ref 80–94)
MONOCYTES # BLD AUTO: 0.86 X10(3)/MCL (ref 0.1–1.3)
MONOCYTES # BLD AUTO: 0.9 X10(3)/MCL (ref 0.1–1.3)
MONOCYTES NFR BLD AUTO: 11 %
MONOCYTES NFR BLD AUTO: 12 %
NEUTROPHILS # BLD AUTO: 4.24 X10(3)/MCL (ref 2.1–9.2)
NEUTROPHILS # BLD AUTO: 4.36 X10(3)/MCL (ref 2.1–9.2)
NEUTROPHILS NFR BLD AUTO: 54.1 %
NEUTROPHILS NFR BLD AUTO: 58.1 %
NRBC BLD AUTO-RTO: 0.3 %
NRBC BLD AUTO-RTO: 0.7 %
PHOSPHATE SERPL-MCNC: 3.1 MG/DL (ref 2.3–4.7)
PLATELET # BLD AUTO: 109 X10(3)/MCL (ref 130–400)
PLATELET # BLD AUTO: 129 X10(3)/MCL (ref 130–400)
PLATELET # BLD EST: ABNORMAL 10*3/UL
PLATELETS.RETICULATED NFR BLD AUTO: 7 % (ref 0.9–11.2)
PMV BLD AUTO: 10.3 FL (ref 7.4–10.4)
PMV BLD AUTO: 10.9 FL (ref 7.4–10.4)
POCT GLUCOSE: 252 MG/DL (ref 70–110)
POCT GLUCOSE: 273 MG/DL (ref 70–110)
POCT GLUCOSE: 288 MG/DL (ref 70–110)
POIKILOCYTOSIS BLD QL SMEAR: ABNORMAL
POTASSIUM SERPL-SCNC: 5 MMOL/L (ref 3.5–5.1)
PROT SERPL-MCNC: 4.7 GM/DL (ref 5.8–7.6)
RBC # BLD AUTO: 2.53 X10(6)/MCL (ref 4.2–5.4)
RBC # BLD AUTO: 3.13 X10(6)/MCL (ref 4.2–5.4)
RBC MORPH BLD: ABNORMAL
RBCS: NORMAL
SODIUM SERPL-SCNC: 133 MMOL/L (ref 136–145)
SPECIMEN OUTDATE: NORMAL
UNIT NUMBER: NORMAL
WBC # BLD AUTO: 7.5 X10(3)/MCL (ref 4.5–11.5)
WBC # BLD AUTO: 7.83 X10(3)/MCL (ref 4.5–11.5)

## 2024-11-30 PROCEDURE — C1751 CATH, INF, PER/CENT/MIDLINE: HCPCS

## 2024-11-30 PROCEDURE — 25000003 PHARM REV CODE 250: Performed by: NURSE PRACTITIONER

## 2024-11-30 PROCEDURE — 86901 BLOOD TYPING SEROLOGIC RH(D): CPT

## 2024-11-30 PROCEDURE — 85025 COMPLETE CBC W/AUTO DIFF WBC: CPT | Performed by: NURSE PRACTITIONER

## 2024-11-30 PROCEDURE — 86923 COMPATIBILITY TEST ELECTRIC: CPT

## 2024-11-30 PROCEDURE — 83735 ASSAY OF MAGNESIUM: CPT

## 2024-11-30 PROCEDURE — 99900031 HC PATIENT EDUCATION (STAT)

## 2024-11-30 PROCEDURE — 84100 ASSAY OF PHOSPHORUS: CPT

## 2024-11-30 PROCEDURE — 94799 UNLISTED PULMONARY SVC/PX: CPT | Mod: XB

## 2024-11-30 PROCEDURE — 63600175 PHARM REV CODE 636 W HCPCS

## 2024-11-30 PROCEDURE — 25000003 PHARM REV CODE 250

## 2024-11-30 PROCEDURE — 63600175 PHARM REV CODE 636 W HCPCS: Performed by: NURSE PRACTITIONER

## 2024-11-30 PROCEDURE — 36415 COLL VENOUS BLD VENIPUNCTURE: CPT | Performed by: SURGERY

## 2024-11-30 PROCEDURE — 36415 COLL VENOUS BLD VENIPUNCTURE: CPT | Performed by: NURSE PRACTITIONER

## 2024-11-30 PROCEDURE — 11000001 HC ACUTE MED/SURG PRIVATE ROOM

## 2024-11-30 PROCEDURE — 99900035 HC TECH TIME PER 15 MIN (STAT)

## 2024-11-30 PROCEDURE — P9016 RBC LEUKOCYTES REDUCED: HCPCS

## 2024-11-30 PROCEDURE — 99233 SBSQ HOSP IP/OBS HIGH 50: CPT | Mod: ,,, | Performed by: SURGERY

## 2024-11-30 PROCEDURE — 80053 COMPREHEN METABOLIC PANEL: CPT | Performed by: NURSE PRACTITIONER

## 2024-11-30 PROCEDURE — 36410 VNPNXR 3YR/> PHY/QHP DX/THER: CPT

## 2024-11-30 PROCEDURE — 36415 COLL VENOUS BLD VENIPUNCTURE: CPT

## 2024-11-30 PROCEDURE — 85025 COMPLETE CBC W/AUTO DIFF WBC: CPT | Performed by: SURGERY

## 2024-11-30 RX ORDER — ISOSORBIDE MONONITRATE 30 MG/1
30 TABLET, EXTENDED RELEASE ORAL DAILY
Status: DISCONTINUED | OUTPATIENT
Start: 2024-11-30 | End: 2024-12-05 | Stop reason: HOSPADM

## 2024-11-30 RX ORDER — INSULIN ASPART 100 [IU]/ML
0-10 INJECTION, SOLUTION INTRAVENOUS; SUBCUTANEOUS
Status: DISCONTINUED | OUTPATIENT
Start: 2024-11-30 | End: 2024-12-05 | Stop reason: HOSPADM

## 2024-11-30 RX ORDER — IBUPROFEN 200 MG
24 TABLET ORAL
Status: DISCONTINUED | OUTPATIENT
Start: 2024-11-30 | End: 2024-12-05 | Stop reason: HOSPADM

## 2024-11-30 RX ORDER — IBUPROFEN 200 MG
16 TABLET ORAL
Status: DISCONTINUED | OUTPATIENT
Start: 2024-11-30 | End: 2024-12-05 | Stop reason: HOSPADM

## 2024-11-30 RX ORDER — MAGNESIUM SULFATE HEPTAHYDRATE 40 MG/ML
2 INJECTION, SOLUTION INTRAVENOUS ONCE
Status: COMPLETED | OUTPATIENT
Start: 2024-11-30 | End: 2024-11-30

## 2024-11-30 RX ORDER — CARVEDILOL 3.12 MG/1
6.25 TABLET ORAL 2 TIMES DAILY
Status: DISCONTINUED | OUTPATIENT
Start: 2024-11-30 | End: 2024-12-05 | Stop reason: HOSPADM

## 2024-11-30 RX ORDER — HYDROCODONE BITARTRATE AND ACETAMINOPHEN 500; 5 MG/1; MG/1
TABLET ORAL
Status: DISCONTINUED | OUTPATIENT
Start: 2024-11-30 | End: 2024-12-03

## 2024-11-30 RX ORDER — LEVOTHYROXINE SODIUM 88 UG/1
88 TABLET ORAL
Status: DISCONTINUED | OUTPATIENT
Start: 2024-12-01 | End: 2024-12-05 | Stop reason: HOSPADM

## 2024-11-30 RX ORDER — SODIUM CHLORIDE 0.9 % (FLUSH) 0.9 %
10 SYRINGE (ML) INJECTION EVERY 12 HOURS PRN
Status: DISCONTINUED | OUTPATIENT
Start: 2024-11-30 | End: 2024-12-05 | Stop reason: HOSPADM

## 2024-11-30 RX ADMIN — MAGNESIUM SULFATE HEPTAHYDRATE 2 G: 40 INJECTION, SOLUTION INTRAVENOUS at 07:11

## 2024-11-30 RX ADMIN — INSULIN ASPART 6 UNITS: 100 INJECTION, SOLUTION INTRAVENOUS; SUBCUTANEOUS at 12:11

## 2024-11-30 RX ADMIN — DOCUSATE SODIUM 100 MG: 100 CAPSULE, LIQUID FILLED ORAL at 11:11

## 2024-11-30 RX ADMIN — CARVEDILOL 6.25 MG: 3.12 TABLET, FILM COATED ORAL at 11:11

## 2024-11-30 RX ADMIN — ENOXAPARIN SODIUM 40 MG: 40 INJECTION SUBCUTANEOUS at 09:11

## 2024-11-30 RX ADMIN — ISOSORBIDE MONONITRATE 30 MG: 30 TABLET, EXTENDED RELEASE ORAL at 09:11

## 2024-11-30 RX ADMIN — ACETAMINOPHEN 650 MG: 325 TABLET, FILM COATED ORAL at 02:11

## 2024-11-30 RX ADMIN — INSULIN ASPART 3 UNITS: 100 INJECTION, SOLUTION INTRAVENOUS; SUBCUTANEOUS at 11:11

## 2024-11-30 RX ADMIN — OXYCODONE HYDROCHLORIDE 5 MG: 5 TABLET ORAL at 08:11

## 2024-11-30 RX ADMIN — GABAPENTIN 300 MG: 300 CAPSULE ORAL at 09:11

## 2024-11-30 RX ADMIN — GABAPENTIN 300 MG: 300 CAPSULE ORAL at 11:11

## 2024-11-30 RX ADMIN — GABAPENTIN 300 MG: 300 CAPSULE ORAL at 02:11

## 2024-11-30 RX ADMIN — METHOCARBAMOL 500 MG: 500 TABLET ORAL at 02:11

## 2024-11-30 RX ADMIN — CARVEDILOL 6.25 MG: 3.12 TABLET, FILM COATED ORAL at 09:11

## 2024-11-30 RX ADMIN — ACETAMINOPHEN 650 MG: 325 TABLET, FILM COATED ORAL at 09:11

## 2024-11-30 RX ADMIN — POLYETHYLENE GLYCOL 3350 17 G: 17 POWDER, FOR SOLUTION ORAL at 09:11

## 2024-11-30 RX ADMIN — POLYETHYLENE GLYCOL 3350 17 G: 17 POWDER, FOR SOLUTION ORAL at 11:11

## 2024-11-30 RX ADMIN — DOCUSATE SODIUM 100 MG: 100 CAPSULE, LIQUID FILLED ORAL at 09:11

## 2024-11-30 RX ADMIN — INSULIN ASPART 4 UNITS: 100 INJECTION, SOLUTION INTRAVENOUS; SUBCUTANEOUS at 06:11

## 2024-11-30 RX ADMIN — ACETAMINOPHEN 650 MG: 325 TABLET, FILM COATED ORAL at 11:11

## 2024-11-30 RX ADMIN — INSULIN GLARGINE 5 UNITS: 100 INJECTION, SOLUTION SUBCUTANEOUS at 11:11

## 2024-11-30 RX ADMIN — METHOCARBAMOL 500 MG: 500 TABLET ORAL at 11:11

## 2024-11-30 RX ADMIN — ENOXAPARIN SODIUM 40 MG: 40 INJECTION SUBCUTANEOUS at 11:11

## 2024-11-30 RX ADMIN — METHOCARBAMOL 500 MG: 500 TABLET ORAL at 06:11

## 2024-11-30 RX ADMIN — ACETAMINOPHEN 650 MG: 325 TABLET, FILM COATED ORAL at 06:11

## 2024-11-30 NOTE — PROGRESS NOTES
TERTIARY TRAUMA SURVEY (TTS)    List Injuries Identified to Date:   Displaced left distal femur fracture with mild comminution    [x]Problems list reviewed  List Operations and Procedures:   1. IMN left femur, 11/27    Past Surgical History:   Procedure Laterality Date    RETROGRADE INTRAMEDULLARY RODDING OF DISTAL FEMUR Left 11/27/2024    Procedure: INSERTION, INTRAMEDULLARY MARCOS, FEMUR, DISTAL, RETROGRADE;  Surgeon: Godwin Teixeira MD;  Location: [facility];  Service: Orthopedics;  Laterality: Left;       Incidental findings:   None    Past Medical History:   DM  HTN  CAD with stents  Hypothyroidism    Active Ambulatory Problems     Diagnosis Date Noted    No Active Ambulatory Problems     Resolved Ambulatory Problems     Diagnosis Date Noted    No Resolved Ambulatory Problems     Past Medical History:   Diagnosis Date    Diabetes mellitus     Hypertension      Past Medical History:   Diagnosis Date    Diabetes mellitus     Hypertension        Tertiary Physical Exam:     Physical Exam  Constitutional:       General: She is not in acute distress.     Appearance: She is obese.   HENT:      Head: Normocephalic and atraumatic.      Mouth/Throat:      Mouth: Mucous membranes are moist.      Pharynx: Oropharynx is clear.   Cardiovascular:      Rate and Rhythm: Normal rate.      Pulses: Normal pulses.   Pulmonary:      Effort: Pulmonary effort is normal. No respiratory distress.   Abdominal:      General: There is no distension.      Palpations: Abdomen is soft.      Tenderness: There is no abdominal tenderness.   Musculoskeletal:      Cervical back: Normal range of motion.      Left upper leg: Swelling and tenderness present.      Comments: Dry dressing in place to left thigh surgical sites. Compartments soft and compressible.    Skin:     General: Skin is warm and dry.      Capillary Refill: Capillary refill takes less than 2 seconds.      Coloration: Skin is pale.   Neurological:      General: No focal deficit  present.      Mental Status: She is alert and oriented to person, place, and time.   Psychiatric:         Mood and Affect: Mood normal.         Behavior: Behavior normal.         Thought Content: Thought content normal.         Judgment: Judgment normal.         Imaging Review:     Imaging Results              CT Knee Without Contrast Left (Final result)  Result time 11/26/24 21:57:29      Final result by Jose Solis MD (11/26/24 21:57:29)                   Impression:      Distal femur fracture is described above      Electronically signed by: Jose Solis MD  Date:    11/26/2024  Time:    21:57               Narrative:    EXAMINATION:  CT KNEE WITHOUT CONTRAST LEFT    CLINICAL HISTORY:  Fracture, knee;    TECHNIQUE:  Automatic exposure control (AEC) was utilized for dose reduction.    Dose: 446 mGycm    COMPARISON:  None    FINDINGS:  There is a distal femur fracture.  The fracture is somewhat impacted with some overlapping of fragments.  The distal fragment is lateral to the medial fragment.  There is mild comminution.  Fracture does not extend intra-articular.  There is a small joint effusion.                                       X-Ray Knee 3 View Left (Final result)  Result time 11/26/24 21:30:30   Procedure changed from X-Ray Knee Complete 4 or More Views Left     Final result by Jose Solis MD (11/26/24 21:30:30)                   Impression:      Displaced distal femur fracture.      Electronically signed by: Jose Solis MD  Date:    11/26/2024  Time:    21:30               Narrative:    EXAMINATION:  XR KNEE 3 VIEW LEFT    CLINICAL HISTORY:  fall; Unspecified fall, initial encounter    TECHNIQUE:  AP, lateral, and Merchant views of the left knee were performed.    COMPARISON:  None    FINDINGS:  There is a displaced distal femur fracture.  It appears oblique in nature.  There is overlapping of fragments.                                       X-Ray Hip 2 or 3 views Left with Pelvis when  performed (Final result)  Result time 11/26/24 21:31:27      Final result by Jose Solis MD (11/26/24 21:31:27)                   Impression:      No acute abnormalities are seen      Electronically signed by: Jose Solis MD  Date:    11/26/2024  Time:    21:31               Narrative:    EXAMINATION:  XR HIP WITH PELVIS WHEN PERFORMED 2 OR 3 VIEWS LEFT    CLINICAL HISTORY:  fall;    TECHNIQUE:  AP view of the pelvis and frog leg lateral view of the left hip were performed.    COMPARISON:  None    FINDINGS:  There are no fractures seen.  There is no dislocation.  There are no bony lesions noted.                                       X-Ray Chest AP Portable (Final result)  Result time 11/26/24 21:22:47      Final result by Jose Solis MD (11/26/24 21:22:47)                   Impression:      No acute disease is seen      Electronically signed by: Jose Solis MD  Date:    11/26/2024  Time:    21:22               Narrative:    EXAMINATION:  XR CHEST AP PORTABLE    CLINICAL HISTORY:  Unspecified fall, initial encounter    TECHNIQUE:  Single frontal view of the chest was performed.    COMPARISON:  01/02/2018    FINDINGS:  Bowel lungs are clear.  Heart size is within normal limits.  Costophrenic angles are clear.                                       Lab Review:   CBC:  Recent Labs   Lab Result Units 11/26/24  2035 11/27/24  0418 11/28/24  0515 11/28/24  1455 11/29/24  0407 11/30/24  0412   WBC x10(3)/mcL 9.61 8.47 7.80 8.51 10.17 7.83   RBC x10(6)/mcL 3.33* 2.87* 1.93* 2.97* 3.18* 2.53*   Hgb g/dL 9.7* 8.4* 5.8* 8.7* 9.3* 7.3*   Hct % 30.8* 27.0* 18.1* 26.2* 28.4* 22.5*   Platelet x10(3)/mcL 207 192 160 135 142 129*   MCV fL 92.5 94.1* 93.8 88.2 89.3 88.9   MCH pg 29.1 29.3 30.1 29.3 29.2 28.9   MCHC g/dL 31.5* 31.1* 32.0* 33.2 32.7* 32.4*       CMP:  Recent Labs   Lab Result Units 11/26/24 2035 11/27/24 0418 11/28/24 0419 11/29/24 0407 11/30/24 0411   Calcium mg/dL 9.5 8.7 9.0 9.2 8.3*   Albumin g/dL  "3.4 3.0* 3.1* 3.1* 2.6*   Sodium mmol/L 140 138 135* 135* 133*   Potassium mmol/L 4.9 4.9 5.2* 5.2* 5.0   CO2 mmol/L 24 22* 24 19* 23   Chloride mmol/L 108* 108* 104 107 101   Blood Urea Nitrogen mg/dL 31.1* 37.4* 37.2* 39.0* 37.6*   Creatinine mg/dL 1.19* 1.15* 1.50* 1.33* 1.21*   ALP unit/L 49 36* 30* 40 41   ALT unit/L 12 11 6 <5 <5   AST unit/L 18 14 15 22 16   Bilirubin Total mg/dL 0.2 0.3 0.3 0.5 0.5       Troponin:  No results for input(s): "TROPONINI" in the last 2160 hours.    ETOH:  No results for input(s): "ETHANOL" in the last 72 hours.     Urine Drug Screen:  No results for input(s): "COCAINE", "OPIATE", "BARBITURATE", "AMPHETAMINE", "FENTANYL", "CANNABINOIDS", "MDMA" in the last 72 hours.    Invalid input(s): "BENZODIAZEPINE", "PHENCYCLIDINE"     Plan:   Tertiary completed with patient's son as  per her request.     Left femur fracture  - Orthopedics following  - s/p IMN left femur, 11/27  - PT/OT  - ASA 81 mg BID on dishcarge  - WBAT LLE with FROM  - Outpatient follow up with Dr. Teixeira in 2 weeks    DM  - POC glucose AC & HS  - Increased sliding scale insulin to moderate; 11/30  - Lantus 5 units qHS; started 11/29    Acute blood loss anemia  - Transfused 2 PRBC 11/28  - H&H down this AM, transfuse 1 unit PRBC  - surgical sites soft and compressible with mild swelling noted to LLE    Hypomagnesemia   - Magnesium replaced 11/27  - Decreased today, given 2 gm replacement     Fall  - Diabetic diet  - Daily labs  - MMPC  - Frequent IS  - Lovenox for VTE prophylaxis   - Fall precautions  - PT/OT  - CM for placement   - Resumed home medications as appropriate     Mackenzie Staley, AGACNP-BC, FNP-BC  Trauma Surgery  [facility]  C: [phone]      "

## 2024-12-01 LAB
ALBUMIN SERPL-MCNC: 2.7 G/DL (ref 3.4–4.8)
ALBUMIN/GLOB SERPL: 1.1 RATIO (ref 1.1–2)
ALP SERPL-CCNC: 49 UNIT/L (ref 40–150)
ALT SERPL-CCNC: 6 UNIT/L (ref 0–55)
ANION GAP SERPL CALC-SCNC: 7 MEQ/L
AST SERPL-CCNC: 21 UNIT/L (ref 5–34)
BASOPHILS # BLD AUTO: 0.01 X10(3)/MCL
BASOPHILS NFR BLD AUTO: 0.2 %
BILIRUB SERPL-MCNC: 0.7 MG/DL
BUN SERPL-MCNC: 32.8 MG/DL (ref 9.8–20.1)
CALCIUM SERPL-MCNC: 8.8 MG/DL (ref 8.4–10.2)
CHLORIDE SERPL-SCNC: 102 MMOL/L (ref 98–107)
CO2 SERPL-SCNC: 25 MMOL/L (ref 23–31)
CREAT SERPL-MCNC: 0.99 MG/DL (ref 0.55–1.02)
CREAT/UREA NIT SERPL: 33
EOSINOPHIL # BLD AUTO: 0.24 X10(3)/MCL (ref 0–0.9)
EOSINOPHIL NFR BLD AUTO: 3.8 %
ERYTHROCYTE [DISTWIDTH] IN BLOOD BY AUTOMATED COUNT: 22.4 % (ref 11.5–17)
GFR SERPLBLD CREATININE-BSD FMLA CKD-EPI: 58 ML/MIN/1.73/M2
GLOBULIN SER-MCNC: 2.4 GM/DL (ref 2.4–3.5)
GLUCOSE SERPL-MCNC: 165 MG/DL (ref 82–115)
HCT VFR BLD AUTO: 24.8 % (ref 37–47)
HGB BLD-MCNC: 8 G/DL (ref 12–16)
IMM GRANULOCYTES # BLD AUTO: 0.03 X10(3)/MCL (ref 0–0.04)
IMM GRANULOCYTES NFR BLD AUTO: 0.5 %
LYMPHOCYTES # BLD AUTO: 1.3 X10(3)/MCL (ref 0.6–4.6)
LYMPHOCYTES NFR BLD AUTO: 20.5 %
MCH RBC QN AUTO: 27 PG (ref 27–31)
MCHC RBC AUTO-ENTMCNC: 32.3 G/DL (ref 33–36)
MCV RBC AUTO: 83.8 FL (ref 80–94)
MONOCYTES # BLD AUTO: 0.78 X10(3)/MCL (ref 0.1–1.3)
MONOCYTES NFR BLD AUTO: 12.3 %
NEUTROPHILS # BLD AUTO: 3.98 X10(3)/MCL (ref 2.1–9.2)
NEUTROPHILS NFR BLD AUTO: 62.7 %
NRBC BLD AUTO-RTO: 0.3 %
PLATELET # BLD AUTO: 155 X10(3)/MCL (ref 130–400)
PMV BLD AUTO: 10.1 FL (ref 7.4–10.4)
POCT GLUCOSE: 225 MG/DL (ref 70–110)
POCT GLUCOSE: 228 MG/DL (ref 70–110)
POCT GLUCOSE: 262 MG/DL (ref 70–110)
POCT GLUCOSE: 267 MG/DL (ref 70–110)
POTASSIUM SERPL-SCNC: 4.7 MMOL/L (ref 3.5–5.1)
PROT SERPL-MCNC: 5.1 GM/DL (ref 5.8–7.6)
RBC # BLD AUTO: 2.96 X10(6)/MCL (ref 4.2–5.4)
SODIUM SERPL-SCNC: 134 MMOL/L (ref 136–145)
WBC # BLD AUTO: 6.34 X10(3)/MCL (ref 4.5–11.5)

## 2024-12-01 PROCEDURE — 94799 UNLISTED PULMONARY SVC/PX: CPT | Mod: XB

## 2024-12-01 PROCEDURE — 85025 COMPLETE CBC W/AUTO DIFF WBC: CPT | Performed by: NURSE PRACTITIONER

## 2024-12-01 PROCEDURE — 63600175 PHARM REV CODE 636 W HCPCS: Performed by: NURSE PRACTITIONER

## 2024-12-01 PROCEDURE — 63600175 PHARM REV CODE 636 W HCPCS

## 2024-12-01 PROCEDURE — 99900031 HC PATIENT EDUCATION (STAT)

## 2024-12-01 PROCEDURE — 99900035 HC TECH TIME PER 15 MIN (STAT)

## 2024-12-01 PROCEDURE — 11000001 HC ACUTE MED/SURG PRIVATE ROOM

## 2024-12-01 PROCEDURE — 80053 COMPREHEN METABOLIC PANEL: CPT | Performed by: NURSE PRACTITIONER

## 2024-12-01 PROCEDURE — 25000003 PHARM REV CODE 250

## 2024-12-01 PROCEDURE — 25000003 PHARM REV CODE 250: Performed by: NURSE PRACTITIONER

## 2024-12-01 PROCEDURE — 36415 COLL VENOUS BLD VENIPUNCTURE: CPT | Performed by: NURSE PRACTITIONER

## 2024-12-01 PROCEDURE — 97530 THERAPEUTIC ACTIVITIES: CPT | Mod: CQ

## 2024-12-01 PROCEDURE — 99233 SBSQ HOSP IP/OBS HIGH 50: CPT | Mod: ,,, | Performed by: SURGERY

## 2024-12-01 RX ADMIN — ENOXAPARIN SODIUM 40 MG: 40 INJECTION SUBCUTANEOUS at 09:12

## 2024-12-01 RX ADMIN — METHOCARBAMOL 500 MG: 500 TABLET ORAL at 09:12

## 2024-12-01 RX ADMIN — INSULIN ASPART 6 UNITS: 100 INJECTION, SOLUTION INTRAVENOUS; SUBCUTANEOUS at 12:12

## 2024-12-01 RX ADMIN — OXYCODONE HYDROCHLORIDE 5 MG: 5 TABLET ORAL at 09:12

## 2024-12-01 RX ADMIN — ACETAMINOPHEN 650 MG: 325 TABLET, FILM COATED ORAL at 03:12

## 2024-12-01 RX ADMIN — OXYCODONE HYDROCHLORIDE 5 MG: 5 TABLET ORAL at 07:12

## 2024-12-01 RX ADMIN — CARVEDILOL 6.25 MG: 3.12 TABLET, FILM COATED ORAL at 09:12

## 2024-12-01 RX ADMIN — DOCUSATE SODIUM 100 MG: 100 CAPSULE, LIQUID FILLED ORAL at 09:12

## 2024-12-01 RX ADMIN — INSULIN GLARGINE 5 UNITS: 100 INJECTION, SOLUTION SUBCUTANEOUS at 09:12

## 2024-12-01 RX ADMIN — POLYETHYLENE GLYCOL 3350 17 G: 17 POWDER, FOR SOLUTION ORAL at 09:12

## 2024-12-01 RX ADMIN — GABAPENTIN 300 MG: 300 CAPSULE ORAL at 09:12

## 2024-12-01 RX ADMIN — INSULIN ASPART 6 UNITS: 100 INJECTION, SOLUTION INTRAVENOUS; SUBCUTANEOUS at 05:12

## 2024-12-01 RX ADMIN — GABAPENTIN 300 MG: 300 CAPSULE ORAL at 03:12

## 2024-12-01 RX ADMIN — INSULIN ASPART 4 UNITS: 100 INJECTION, SOLUTION INTRAVENOUS; SUBCUTANEOUS at 07:12

## 2024-12-01 RX ADMIN — METHOCARBAMOL 500 MG: 500 TABLET ORAL at 03:12

## 2024-12-01 RX ADMIN — ACETAMINOPHEN 650 MG: 325 TABLET, FILM COATED ORAL at 09:12

## 2024-12-01 RX ADMIN — LEVOTHYROXINE SODIUM 88 MCG: 88 TABLET ORAL at 06:12

## 2024-12-01 RX ADMIN — ACETAMINOPHEN 650 MG: 325 TABLET, FILM COATED ORAL at 06:12

## 2024-12-01 RX ADMIN — ISOSORBIDE MONONITRATE 30 MG: 30 TABLET, EXTENDED RELEASE ORAL at 09:12

## 2024-12-01 RX ADMIN — METHOCARBAMOL 500 MG: 500 TABLET ORAL at 06:12

## 2024-12-01 NOTE — PLAN OF CARE
Problem: Adult Inpatient Plan of Care  Goal: Plan of Care Review  Outcome: Progressing  Goal: Patient-Specific Goal (Individualized)  Outcome: Progressing  Goal: Absence of Hospital-Acquired Illness or Injury  Outcome: Progressing  Goal: Optimal Comfort and Wellbeing  Outcome: Progressing  Goal: Readiness for Transition of Care  Outcome: Progressing     Problem: Bariatric Environmental Safety  Goal: Safety Maintained with Care  Outcome: Progressing     Problem: Fall Injury Risk  Goal: Absence of Fall and Fall-Related Injury  Outcome: Progressing     Problem: Wound  Goal: Optimal Coping  Outcome: Progressing  Goal: Optimal Functional Ability  Outcome: Progressing  Goal: Absence of Infection Signs and Symptoms  Outcome: Progressing  Goal: Improved Oral Intake  Outcome: Progressing  Goal: Optimal Pain Control and Function  Outcome: Progressing  Goal: Skin Health and Integrity  Outcome: Progressing  Goal: Optimal Wound Healing  Outcome: Progressing     Problem: Diabetes Comorbidity  Goal: Blood Glucose Level Within Targeted Range  Outcome: Progressing     Problem: Infection  Goal: Absence of Infection Signs and Symptoms  Outcome: Progressing

## 2024-12-01 NOTE — PROGRESS NOTES
Trauma Surgery   Progress Note  Patient Name: Gabriela Lozano                   : 1945     MRN: [id number]   Date of Admission: 2024  Code Status: Full Code  Date of Exam: 2024  HD#5  POD#4 Days Post-Op  Attending Provider: Shiv Moreno Jr., *    Subjective:   Interval history:  NAEON  AFVSS  Sleeping this AM  Received 1 U PRBC yesterday     Home Meds:   Current Outpatient Medications   Medication Instructions    atorvastatin (LIPITOR) 40 mg, Oral, Daily    carvediloL (COREG) 6.25 mg, 2 times daily    clopidogreL (PLAVIX) 75 mg, Oral, Daily    gabapentin (NEURONTIN) 600 mg, 2 times daily    insulin glargine U-100 (Lantus) 30 Units, Subcutaneous, Daily    isosorbide mononitrate (IMDUR) 30 mg, Daily    lisinopriL (PRINIVIL,ZESTRIL) 20 mg, Oral, Daily    metFORMIN (GLUCOPHAGE) 1,000 mg, Oral, 2 times daily    traMADoL (ULTRAM) 50 mg, Oral, Daily PRN      Scheduled Meds:   acetaminophen  650 mg Oral Q4H    carvediloL  6.25 mg Oral BID    docusate sodium  100 mg Oral BID    enoxparin  40 mg Subcutaneous Q12H    gabapentin  300 mg Oral TID    insulin glargine U-100  5 Units Subcutaneous QHS    isosorbide mononitrate  30 mg Oral Daily    levothyroxine  88 mcg Oral Before breakfast    methocarbamoL  500 mg Oral Q8H    polyethylene glycol  17 g Oral BID     Continuous Infusions:  PRN Meds:  Current Facility-Administered Medications:     0.9%  NaCl infusion (for blood administration), , Intravenous, Q24H PRN    dextrose 10%, 12.5 g, Intravenous, PRN    dextrose 10%, 25 g, Intravenous, PRN    glucose, 16 g, Oral, PRN    glucose, 24 g, Oral, PRN    insulin aspart U-100, 0-10 Units, Subcutaneous, QID (AC + HS) PRN    magnesium hydroxide 400 mg/5 ml, 30 mL, Oral, Daily PRN    melatonin, 6 mg, Oral, Nightly PRN    morphine, 2 mg, Intravenous, Q4H PRN    oxyCODONE, 5 mg, Oral, Q4H PRN    oxyCODONE, 10 mg, Oral, Q4H PRN    Flushing PICC/Midline Protocol, , , Until Discontinued **AND** sodium  "chloride 0.9%, 10 mL, Intravenous, Q12H PRN     Objective:     VITAL SIGNS: 24 HR MIN & MAX LAST   Temp  Min: 97.9 °F (36.6 °C)  Max: 98.8 °F (37.1 °C)  97.9 °F (36.6 °C)   BP  Min: 98/50  Max: 147/97  (!) 147/97    Pulse  Min: 77  Max: 94  77    Resp  Min: 15  Max: 20  15    SpO2  Min: 95 %  Max: 99 %  95 %      HT: 4' 11" (149.9 cm)  WT: 84.4 kg (186 lb)  BMI: 37.5     Intake/output:  Intake/Output - Last 3 Shifts         11/29 0700  11/30 0659 11/30 0700  12/01 0659    P.O. 1860 1160    Total Intake(mL/kg) 1860 (22) 1160 (13.7)    Urine (mL/kg/hr) 1800 (0.9) 1050 (0.5)    Emesis/NG output 0 0    Stool 0 0    Total Output 1800 1050    Net +60 +110          Stool Occurrence 0 x 0 x    Emesis Occurrence 0 x 0 x            Intake/Output Summary (Last 24 hours) at 12/1/2024 0644  Last data filed at 11/30/2024 2300  Gross per 24 hour   Intake 1160 ml   Output 1050 ml   Net 110 ml         Lines/drains/airway:       Peripheral IV - Single Lumen 11/26/24 2130 20 G Anterior;Distal;Left Forearm (Active)   Site Assessment Clean;Dry;Intact;No redness;No swelling 11/28/24 0400   Line Securement Device Secured with sutureless device 11/27/24 2000   Extremity Assessment Distal to IV No abnormal discoloration;No redness;No swelling;No warmth 11/27/24 2000   Line Status Capped 11/28/24 0400   Dressing Status Clean;Dry;Intact 11/28/24 0400   Dressing Intervention Integrity maintained 11/28/24 0400   Number of days: 1       Physical examination:  Gen: NAD, AAOx3, answering questions appropriately  HEENT: NC  CV: RR  Resp: NWOB  Abd: S/NT/ND  Msk: moving all extremities spontaneously and purposefully. LLE dressing c/d/i  Neuro: CN II-XII grossly intact, GCS 15    Labs:  Renal:  Recent Labs     11/29/24 0407 11/30/24 0411   BUN 39.0* 37.6*   CREATININE 1.33* 1.21*     No results for input(s): "LACTIC" in the last 72 hours.  FEN/GI:  Recent Labs     11/29/24 0407 11/30/24 0411   * 133*   K 5.2* 5.0    101   CO2 19* 23 " "  CALCIUM 9.2 8.3*   MG 1.70 1.40*   PHOS 3.2 3.1   ALBUMIN 3.1* 2.6*   BILITOT 0.5 0.5   AST 22 16   ALKPHOS 40 41   ALT <5 <5     Heme:  Recent Labs     11/28/24  1455 11/29/24  0407 11/30/24  0412 11/30/24 1954   HGB 8.7* 9.3* 7.3* 8.3*   HCT 26.2* 28.4* 22.5* 25.9*    142 129* 109*     ID:  Recent Labs     11/28/24  1455 11/29/24  0407 11/30/24  0412 11/30/24 1954   WBC 8.51 10.17 7.83 7.50     CBG:  Recent Labs     11/29/24  0407 11/30/24  0411   GLUCOSE 224* 235*      Recent Labs     11/29/24  0609 11/29/24  1116 11/29/24  1623 11/29/24  2232 11/30/24  0442 11/30/24  1107 11/30/24  2149 12/01/24  0626   POCTGLUCOSE 239* 290* 264* 264* 252* 273* 288* 225*      Cardiovascular:  No results for input(s): "TROPONINI", "CKTOTAL", "CKMB", "BNP" in the last 168 hours.  I have reviewed all pertinent lab results within the past 24 hours.    Imaging:  SURG FL Surgery Fluoro Usage   Final Result      X-Ray Femur 2 View Left   Final Result      As above.         Electronically signed by: David Lomeli   Date:    11/27/2024   Time:    12:36      CT Knee Without Contrast Left   Final Result      Distal femur fracture is described above         Electronically signed by: Jose Solis MD   Date:    11/26/2024   Time:    21:57      X-Ray Knee 3 View Left   Final Result      Displaced distal femur fracture.         Electronically signed by: Jose Solis MD   Date:    11/26/2024   Time:    21:30      X-Ray Hip 2 or 3 views Left with Pelvis when performed   Final Result      No acute abnormalities are seen         Electronically signed by: Jose Solis MD   Date:    11/26/2024   Time:    21:31      X-Ray Chest AP Portable   Final Result      No acute disease is seen         Electronically signed by: Jose Solis MD   Date:    11/26/2024   Time:    21:22         I have reviewed all pertinent imaging results/findings within the past 24 hours.    Micro/Path/Other:  Microbiology Results (last 7 days)       ** No results " found for the last 168 hours. **           Pathology Results  (Last 7 days)      None             Problems list:  Active Problem List with Overview Notes    Diagnosis Date Noted    Fall 11/30/2024    Acute blood loss anemia 11/30/2024    Type 2 diabetes mellitus, with long-term current use of insulin 11/30/2024    Unspecified fracture of lower end of left femur, initial encounter for closed fracture 11/26/2024      Active Diagnoses:    Diagnosis Date Noted POA    PRINCIPAL PROBLEM:  Unspecified fracture of lower end of left femur, initial encounter for closed fracture [S72.402A] 11/26/2024 Yes    Fall [W19.XXXA] 11/30/2024 Yes    Acute blood loss anemia [D62] 11/30/2024 No    Type 2 diabetes mellitus, with long-term current use of insulin [E11.9, Z79.4] 11/30/2024 Not Applicable      Problems Resolved During this Admission:       Assessment & Plan:   L femur fx s/p IMN    Left femur fracture  - Orthopedics following  - s/p IMN left femur, 11/27  - PT/OT  - ASA 81 mg BID on dishcarge  - WBAT LLE with FROM  - Outpatient follow up with Dr. Teixeira in 2 weeks     DM  - POC glucose AC & HS  - Increased sliding scale insulin to moderate; 11/30  - Lantus 5 units qHS; started 11/29     Acute blood loss anemia  - Transfused 2 PRBC 11/28; 1 U PRBC 11/30  - follow up AM CBC, trend H/H  - surgical sites soft and compressible with mild swelling noted to LLE     Hypomagnesemia   - Magnesium replaced 11/27     Fall  - Diabetic diet  - Daily labs  - MMPC  - Frequent IS  - Lovenox for VTE prophylaxis   - Fall precautions  - PT/OT  - CM for placement   - Resumed home medications as appropriate       12/1/2024     The above findings, diagnostics, and treatment plan were discussed with Dr. Shiv Moreno who will follow with further assessments and recommendations. Please call with any questions, concerns, or clinical status changes.  This note/OR report was created with the assistance of  voice recognition software or phone  dictation.   There may be transcription errors as a result of using this technology however minimal. Effort has been made to assure accuracy of transcription but any obvious errors or omissions should be clarified with the author of the document.

## 2024-12-01 NOTE — PROCEDURES
"Gabriela Lozano is a 79 y.o. female patient.    Temp: 98.5 °F (36.9 °C) (11/30/24 1523)  Pulse: 81 (11/30/24 1523)  Resp: 18 (11/30/24 1136)  BP: (!) 98/50 (11/30/24 1523)  SpO2: 97 % (11/30/24 1523)  Weight: 84.4 kg (186 lb) (11/26/24 2352)  Height: 4' 11" (149.9 cm) (11/26/24 2352)    PICC  Date/Time: 11/30/2024 6:06 PM  Performed by: Kashmir Spence, RN  Consent Done: Yes  Indications: med administration  Preparation: skin prepped with ChloraPrep  Skin prep agent dried: skin prep agent completely dried prior to procedure  Sterile barriers: all five maximum sterile barriers used - cap, mask, sterile gown, sterile gloves, and large sterile sheet  Hand hygiene: hand hygiene performed prior to central venous catheter insertion  Location details: right basilic  Catheter type: single lumen  Catheter size: 4 Fr  Catheter Length: 13cm              Kashmir Spence RN  11/30/2024    " Detail Level: Detailed Wound Evaluated By: Dr. Becki Cai Add [id number] Cpt? (Important Note: In 2017 The Use Of 62557 Is Being Tracked By Cms To Determine Future Global Period Reimbursement For Global Periods): yes

## 2024-12-01 NOTE — PT/OT/SLP PROGRESS
Physical Therapy Treatment    Patient Name:  Gabriela Lozano   MRN:  [id number]    Recommendations:     Discharge therapy intensity: Moderate Intensity Therapy   Discharge Equipment Recommendations: walker, rolling  Barriers to discharge: Impaired mobility, Ongoing medical needs, and placement    Assessment:     Gabriela Lozano is a 79 y.o. female admitted with a medical diagnosis of L femur fx s/p IMR.  She presents with the following impairments/functional limitations: weakness, gait instability, impaired endurance, impaired balance, impaired functional mobility, pain, decreased lower extremity function, decreased ROM, orthopedic precautions.     Cherri ([id number]) used for session.     Rehab Prognosis: Fair; patient would benefit from acute skilled PT services to address these deficits and reach maximum level of function.    Recent Surgery: Procedure(s) (LRB):  INSERTION, INTRAMEDULLARY MARCOS, FEMUR, DISTAL, RETROGRADE (Left) 4 Days Post-Op    Plan:     During this hospitalization, patient would benefit from acute PT services 6 x/week to address the identified rehab impairments via gait training, therapeutic activities, therapeutic exercises and progress toward the following goals:    Plan of Care Expires:  12/29/24    Subjective     Chief Complaint: 5/10 pain LLE prior to ax  Patient/Family Comments/goals:   Pain/Comfort:         Objective:     Communicated with RN prior to session.  Patient found HOB elevated with peripheral IV, pulse ox (continuous), telemetry upon PT entry to room.     General Precautions: Standard, fall  Orthopedic Precautions: LLE weight bearing as tolerated  Braces: N/A  Respiratory Status: Room air  Blood Pressure: 115/59 EOB w/ c/o dizziness  Skin Integrity: Visible skin intact    Functional Mobility:  Bed Mobility:     Supine to Sit: maximal assistance and of 2 persons  Sit to Supine: maximal assistance and of 2 persons  Transfers:     Sit to Stand:  maximal  assistance and of 2 persons with hand-held assist  Balance: cga for static seated balance    Therapeutic Activities/Exercises:  2 trials STS perf maxAx2 w/HHA; pt able to clear buttock, but unable to stand fully erect 2/2 increased pain in LLE w/WB; pt only able to stand for ~10sec.     Education:  Patient provided with verbal education education regarding PT role/goals/POC, fall prevention, and safety awareness.  Understanding was verbalized.     Patient left HOB elevated with all lines intact, call button in reach, and pressure relief boots    GOALS:   Multidisciplinary Problems       Physical Therapy Goals          Problem: Physical Therapy    Goal Priority Disciplines Outcome Interventions   Physical Therapy Goal     PT, PT/OT Progressing    Description: Goals to be met by: 2024     Patient will increase functional independence with mobility by performin. Supine to sit with MInimal Assistance  2. Sit to supine with MInimal Assistance  3. Sit to stand transfer with Contact Guard Assistance  4. Gait  x 100 feet with Minimal Assistance using Rolling Walker.                          Time Tracking:     PT Received On: 24  PT Start Time: 1003     PT Stop Time: 1028  PT Total Time (min): 25 min     Billable Minutes: Therapeutic Activity 2    Treatment Type: Treatment  PT/PTA: PTA     Number of PTA visits since last PT visit: 1     2024

## 2024-12-02 LAB
ALBUMIN SERPL-MCNC: 2.5 G/DL (ref 3.4–4.8)
ALBUMIN/GLOB SERPL: 0.9 RATIO (ref 1.1–2)
ALP SERPL-CCNC: 49 UNIT/L (ref 40–150)
ALT SERPL-CCNC: 12 UNIT/L (ref 0–55)
ANION GAP SERPL CALC-SCNC: 6 MEQ/L
AST SERPL-CCNC: 23 UNIT/L (ref 5–34)
BASOPHILS # BLD AUTO: 0.02 X10(3)/MCL
BASOPHILS NFR BLD AUTO: 0.3 %
BILIRUB SERPL-MCNC: 0.8 MG/DL
BUN SERPL-MCNC: 33.7 MG/DL (ref 9.8–20.1)
CALCIUM SERPL-MCNC: 9.1 MG/DL (ref 8.4–10.2)
CHLORIDE SERPL-SCNC: 102 MMOL/L (ref 98–107)
CO2 SERPL-SCNC: 26 MMOL/L (ref 23–31)
CREAT SERPL-MCNC: 0.98 MG/DL (ref 0.55–1.02)
CREAT/UREA NIT SERPL: 34
CRP SERPL-MCNC: 115.2 MG/L
EOSINOPHIL # BLD AUTO: 0.24 X10(3)/MCL (ref 0–0.9)
EOSINOPHIL NFR BLD AUTO: 3.6 %
ERYTHROCYTE [DISTWIDTH] IN BLOOD BY AUTOMATED COUNT: 22.4 % (ref 11.5–17)
GFR SERPLBLD CREATININE-BSD FMLA CKD-EPI: 58 ML/MIN/1.73/M2
GLOBULIN SER-MCNC: 2.7 GM/DL (ref 2.4–3.5)
GLUCOSE SERPL-MCNC: 160 MG/DL (ref 82–115)
HCT VFR BLD AUTO: 22.9 % (ref 37–47)
HGB BLD-MCNC: 7.4 G/DL (ref 12–16)
IMM GRANULOCYTES # BLD AUTO: 0.07 X10(3)/MCL (ref 0–0.04)
IMM GRANULOCYTES NFR BLD AUTO: 1 %
LYMPHOCYTES # BLD AUTO: 1.89 X10(3)/MCL (ref 0.6–4.6)
LYMPHOCYTES NFR BLD AUTO: 28.2 %
MCH RBC QN AUTO: 27.1 PG (ref 27–31)
MCHC RBC AUTO-ENTMCNC: 32.3 G/DL (ref 33–36)
MCV RBC AUTO: 83.9 FL (ref 80–94)
MONOCYTES # BLD AUTO: 1.12 X10(3)/MCL (ref 0.1–1.3)
MONOCYTES NFR BLD AUTO: 16.7 %
NEUTROPHILS # BLD AUTO: 3.36 X10(3)/MCL (ref 2.1–9.2)
NEUTROPHILS NFR BLD AUTO: 50.2 %
NRBC BLD AUTO-RTO: 0.3 %
PLATELET # BLD AUTO: 168 X10(3)/MCL (ref 130–400)
PMV BLD AUTO: 10.1 FL (ref 7.4–10.4)
POCT GLUCOSE: 200 MG/DL (ref 70–110)
POCT GLUCOSE: 269 MG/DL (ref 70–110)
POCT GLUCOSE: 277 MG/DL (ref 70–110)
POCT GLUCOSE: 283 MG/DL (ref 70–110)
POTASSIUM SERPL-SCNC: 4.6 MMOL/L (ref 3.5–5.1)
PREALB SERPL-MCNC: 12.3 MG/DL (ref 14–37)
PROT SERPL-MCNC: 5.2 GM/DL (ref 5.8–7.6)
RBC # BLD AUTO: 2.73 X10(6)/MCL (ref 4.2–5.4)
SODIUM SERPL-SCNC: 134 MMOL/L (ref 136–145)
WBC # BLD AUTO: 6.7 X10(3)/MCL (ref 4.5–11.5)

## 2024-12-02 PROCEDURE — 25000003 PHARM REV CODE 250: Performed by: NURSE PRACTITIONER

## 2024-12-02 PROCEDURE — 97530 THERAPEUTIC ACTIVITIES: CPT | Mod: CQ

## 2024-12-02 PROCEDURE — 99900031 HC PATIENT EDUCATION (STAT)

## 2024-12-02 PROCEDURE — 63600175 PHARM REV CODE 636 W HCPCS

## 2024-12-02 PROCEDURE — 86140 C-REACTIVE PROTEIN: CPT | Performed by: NURSE PRACTITIONER

## 2024-12-02 PROCEDURE — 63600175 PHARM REV CODE 636 W HCPCS: Performed by: NURSE PRACTITIONER

## 2024-12-02 PROCEDURE — 11000001 HC ACUTE MED/SURG PRIVATE ROOM

## 2024-12-02 PROCEDURE — 94799 UNLISTED PULMONARY SVC/PX: CPT

## 2024-12-02 PROCEDURE — 97535 SELF CARE MNGMENT TRAINING: CPT

## 2024-12-02 PROCEDURE — 99233 SBSQ HOSP IP/OBS HIGH 50: CPT | Mod: ,,, | Performed by: SURGERY

## 2024-12-02 PROCEDURE — 36415 COLL VENOUS BLD VENIPUNCTURE: CPT | Performed by: NURSE PRACTITIONER

## 2024-12-02 PROCEDURE — 85025 COMPLETE CBC W/AUTO DIFF WBC: CPT | Performed by: NURSE PRACTITIONER

## 2024-12-02 PROCEDURE — 80053 COMPREHEN METABOLIC PANEL: CPT | Performed by: NURSE PRACTITIONER

## 2024-12-02 PROCEDURE — 94760 N-INVAS EAR/PLS OXIMETRY 1: CPT

## 2024-12-02 PROCEDURE — 25000003 PHARM REV CODE 250

## 2024-12-02 PROCEDURE — 84134 ASSAY OF PREALBUMIN: CPT | Performed by: NURSE PRACTITIONER

## 2024-12-02 RX ORDER — INSULIN GLARGINE 100 [IU]/ML
10 INJECTION, SOLUTION SUBCUTANEOUS NIGHTLY
Status: DISCONTINUED | OUTPATIENT
Start: 2024-12-02 | End: 2024-12-05 | Stop reason: HOSPADM

## 2024-12-02 RX ADMIN — LEVOTHYROXINE SODIUM 88 MCG: 88 TABLET ORAL at 05:12

## 2024-12-02 RX ADMIN — INSULIN GLARGINE 10 UNITS: 100 INJECTION, SOLUTION SUBCUTANEOUS at 08:12

## 2024-12-02 RX ADMIN — CARVEDILOL 6.25 MG: 3.12 TABLET, FILM COATED ORAL at 09:12

## 2024-12-02 RX ADMIN — POLYETHYLENE GLYCOL 3350 17 G: 17 POWDER, FOR SOLUTION ORAL at 09:12

## 2024-12-02 RX ADMIN — OXYCODONE HYDROCHLORIDE 10 MG: 10 TABLET ORAL at 08:12

## 2024-12-02 RX ADMIN — DOCUSATE SODIUM 100 MG: 100 CAPSULE, LIQUID FILLED ORAL at 08:12

## 2024-12-02 RX ADMIN — ENOXAPARIN SODIUM 40 MG: 40 INJECTION SUBCUTANEOUS at 08:12

## 2024-12-02 RX ADMIN — GABAPENTIN 300 MG: 300 CAPSULE ORAL at 08:12

## 2024-12-02 RX ADMIN — METHOCARBAMOL 500 MG: 500 TABLET ORAL at 08:12

## 2024-12-02 RX ADMIN — INSULIN ASPART 6 UNITS: 100 INJECTION, SOLUTION INTRAVENOUS; SUBCUTANEOUS at 12:12

## 2024-12-02 RX ADMIN — METHOCARBAMOL 500 MG: 500 TABLET ORAL at 05:12

## 2024-12-02 RX ADMIN — CARVEDILOL 6.25 MG: 3.12 TABLET, FILM COATED ORAL at 08:12

## 2024-12-02 RX ADMIN — INSULIN ASPART 6 UNITS: 100 INJECTION, SOLUTION INTRAVENOUS; SUBCUTANEOUS at 05:12

## 2024-12-02 RX ADMIN — DOCUSATE SODIUM 100 MG: 100 CAPSULE, LIQUID FILLED ORAL at 09:12

## 2024-12-02 RX ADMIN — OXYCODONE HYDROCHLORIDE 10 MG: 10 TABLET ORAL at 09:12

## 2024-12-02 RX ADMIN — POLYETHYLENE GLYCOL 3350 17 G: 17 POWDER, FOR SOLUTION ORAL at 08:12

## 2024-12-02 RX ADMIN — GABAPENTIN 300 MG: 300 CAPSULE ORAL at 05:12

## 2024-12-02 RX ADMIN — ISOSORBIDE MONONITRATE 30 MG: 30 TABLET, EXTENDED RELEASE ORAL at 09:12

## 2024-12-02 RX ADMIN — Medication 6 MG: at 08:12

## 2024-12-02 RX ADMIN — GABAPENTIN 300 MG: 300 CAPSULE ORAL at 09:12

## 2024-12-02 RX ADMIN — ENOXAPARIN SODIUM 40 MG: 40 INJECTION SUBCUTANEOUS at 09:12

## 2024-12-02 RX ADMIN — INSULIN ASPART 1 UNITS: 100 INJECTION, SOLUTION INTRAVENOUS; SUBCUTANEOUS at 12:12

## 2024-12-02 RX ADMIN — INSULIN ASPART 2 UNITS: 100 INJECTION, SOLUTION INTRAVENOUS; SUBCUTANEOUS at 05:12

## 2024-12-02 NOTE — ANESTHESIA POSTPROCEDURE EVALUATION
Anesthesia Post Evaluation    Patient: Gabriela Lozano    Procedure(s) Performed: Procedure(s) (LRB):  INSERTION, INTRAMEDULLARY MARCOS, FEMUR, DISTAL, RETROGRADE (Left)    Final Anesthesia Type: general      Patient location during evaluation: PACU  Patient participation: Yes- Able to Participate  Level of consciousness: awake and alert  Post-procedure vital signs: reviewed and stable  Pain management: adequate  Airway patency: patent      Anesthetic complications: no      Cardiovascular status: hemodynamically stable  Respiratory status: unassisted  Hydration status: euvolemic  Follow-up not needed.              Vitals Value Taken Time   /68 12/02/24 1114   Temp 37.2 °C (98.9 °F) 12/02/24 1114   Pulse 90 12/02/24 1114   Resp 18 12/02/24 1114   SpO2 95 % 12/02/24 1114         Event Time   Out of Recovery 11/27/2024 12:35:00         Pain/Mary Score: Pain Rating Prior to Med Admin: 10 (12/2/2024  9:30 AM)  Pain Rating Post Med Admin: 3 (12/2/2024 10:30 AM)

## 2024-12-02 NOTE — PT/OT/SLP PROGRESS
Occupational Therapy   Treatment    Name: Gabriela Lozano  MRN: [id number]  Admitting Diagnosis:  Unspecified fracture of lower end of left femur, initial encounter for closed fracture  5 Days Post-Op    Recommendations:     Recommended therapy intensity at discharge: Moderate Intensity Therapy   Discharge Equipment Recommendations:  to be determined by next level of care  Barriers to discharge:  Decreased caregiver support    Assessment:     Gabriela Lozano is a 79 y.o. female with a medical diagnosis of L femur fx s/p IMN 11/27 as result of fall.  She presents with the following performance deficits affecting function are weakness, impaired endurance, impaired self care skills, impaired functional mobility, gait instability, impaired balance, decreased safety awareness, pain, decreased lower extremity function.      [place] #[id number] utilized during session. Pt limited in mobility by pain this date, reporting more pain in LLE than RLE. Per pt's son, pt has baseline pain and weakness at RLE due to low back injury. RN present at beginning of session to distribute oral pain medication, therefore pain significantly hindering session. Pt would benefit from timing therapy session around pain meds for increased ability to participate. Pt also limited by height of bed this date, pt nearly sliding off of bed towards end of session, ordered new bed through [employer].    Rehab Prognosis:  Good; patient would benefit from acute skilled OT services to address these deficits and reach maximum level of function.       Plan:     Patient to be seen 6 x/week to address the above listed problems via self-care/home management, therapeutic activities, therapeutic exercises  Plan of Care Expires: 12/27/24  Plan of Care Reviewed with: patient    Subjective     Pain/Comfort:  Pain Rating 1: 7/10  Location - Side 1: Left  Location - Orientation 1: upper  Location 1: leg  Pain Addressed 1: Pre-medicate for  activity, Reposition, Distraction, Cessation of Activity, Nurse notified    Objective:     Communicated with: RN prior to session.  Patient found HOB elevated with pulse ox (continuous), pressure relief boots, PureWick, peripheral IV, telemetry upon OT entry to room.    General Precautions: Standard, fall    Orthopedic Precautions:LLE weight bearing as tolerated  Braces: N/A  Respiratory Status: Room air     Occupational Performance:     Bed Mobility:    Patient completed Rolling/Turning to Left with moderate assistance  Patient completed Rolling/Turning to Right with maximal assistance  Patient completed Supine to Sit with maximal assistance x2  Patient completed Sit to Supine with maximal assistance x2    Functional Mobility/Transfers:  Patient completed Sit <> Stand Transfer with maximal assistance x2 with rolling walker, minimally cleared buttocks from bed as pt with pain in BLEs (R>L).     Activities of Daily Living:  Grooming: contact guard assistance completing oral care and combing hair/washing face while seated EOB, CGA needed due to height of bed to prevent sliding from EOB  Lower Body Dressing: maximal assistance donning/doffing brief in prep for mobility    Therapeutic Positioning    OT interventions performed during the course of today's session in an effort to prevent and/or reduce acquired pressure injuries:   Education was provided on benefits of and recommendations for therapeutic positioning  Therapeutic positioning was provided at the conclusion of session to offload all bony prominences for the prevention and/or reduction of pressure injuries    Skin assessment: visible skin intact    [facility] 6 Click ADL: 18    Patient Education:  Patient and son/s were provided with verbal education education regarding OT role/goals/POC, fall prevention, safety awareness, and Discharge/DME recommendations.  Understanding was verbalized.    Patient left HOB elevated with all lines intact, call button in reach,  pressure relief boots, RN notified, and pt's son present.    GOALS:   Multidisciplinary Problems       Occupational Therapy Goals          Problem: Occupational Therapy    Goal Priority Disciplines Outcome Interventions   Occupational Therapy Goal     OT, PT/OT Progressing    Description: Goals to be met by: 12/27/24     Patient will increase functional independence with ADLs by performing:    LE Dressing with Modified Ramsay.  Grooming while standing with Modified Ramsay.  Toileting from bedside commode with Modified Ramsay for hygiene and clothing management.   Toilet transfer to bedside commode with Modified Ramsay. Progress as appropriate.                          Time Tracking:     OT Date of Treatment: 12/02/24  OT Start Time: 0919  OT Stop Time: 0955  OT Total Time (min): 36 min    Billable Minutes:Self Care/Home Management 36 mins    OT/NOEMI: OT     Number of NOEMI visits since last OT visit: 1 12/2/2024

## 2024-12-02 NOTE — PROGRESS NOTES
Inpatient Nutrition Evaluation    Admit Date: 11/26/2024   Total duration of encounter: 6 days   Patient Age: 79 y.o.    Nutrition Recommendation/Prescription     -Continue 2000 Calorie Diabetic Diet as tolerated.   -Monitor wt, labs, and intake.     Nutrition Assessment     Chart Review    Reason Seen: continuous nutrition monitoring and length of stay    Malnutrition Screening Tool Results   Have you recently lost weight without trying?: No  Have you been eating poorly because of a decreased appetite?: No   MST Score: 0   Diagnosis:  L femur fx s/p IMN     Relevant Medical History: DM, HTN    Scheduled Medications:  carvediloL, 6.25 mg, BID  docusate sodium, 100 mg, BID  enoxparin, 40 mg, Q12H  gabapentin, 300 mg, TID  insulin glargine U-100, 5 Units, QHS  isosorbide mononitrate, 30 mg, Daily  levothyroxine, 88 mcg, Before breakfast  methocarbamoL, 500 mg, Q8H  polyethylene glycol, 17 g, BID    Continuous Infusions:   PRN Medications:   Current Facility-Administered Medications:     0.9%  NaCl infusion (for blood administration), , Intravenous, Q24H PRN    dextrose 10%, 12.5 g, Intravenous, PRN    dextrose 10%, 25 g, Intravenous, PRN    glucose, 16 g, Oral, PRN    glucose, 24 g, Oral, PRN    insulin aspart U-100, 0-10 Units, Subcutaneous, QID (AC + HS) PRN    magnesium hydroxide 400 mg/5 ml, 30 mL, Oral, Daily PRN    melatonin, 6 mg, Oral, Nightly PRN    morphine, 2 mg, Intravenous, Q4H PRN    oxyCODONE, 5 mg, Oral, Q4H PRN    oxyCODONE, 10 mg, Oral, Q4H PRN    Flushing PICC/Midline Protocol, , , Until Discontinued **AND** sodium chloride 0.9%, 10 mL, Intravenous, Q12H PRN    Recent Labs   Lab 11/26/24 2035 11/27/24 0418 11/28/24  0419 11/28/24  0515 11/28/24  1455 11/29/24  0407 11/30/24  0411 11/30/24  0412 11/30/24  1954 12/01/24  0857 12/02/24  0402 12/02/24  0403    138 135*  --   --  135* 133*  --   --  134*  --  134*   K 4.9 4.9 5.2*  --   --  5.2* 5.0  --   --  4.7  --  4.6   CALCIUM 9.5 8.7 9.0   "--   --  9.2 8.3*  --   --  8.8  --  9.1   PHOS  --  3.1 4.4  --   --  3.2 3.1  --   --   --   --   --    MG  --  0.90* 2.00  --   --  1.70 1.40*  --   --   --   --   --    * 108* 104  --   --  107 101  --   --  102  --  102   CO2 24 22* 24  --   --  19* 23  --   --  25  --  26   BUN 31.1* 37.4* 37.2*  --   --  39.0* 37.6*  --   --  32.8*  --  33.7*   CREATININE 1.19* 1.15* 1.50*  --   --  1.33* 1.21*  --   --  0.99  --  0.98   EGFRNORACEVR 46 48 35  --   --  41 45  --   --  58  --  58   GLUCOSE 210* 105 222*  --   --  224* 235*  --   --  165*  --  160*   BILITOT 0.2 0.3 0.3  --   --  0.5 0.5  --   --  0.7  --  0.8   ALKPHOS 49 36* 30*  --   --  40 41  --   --  49  --  49   ALT 12 11 6  --   --  <5 <5  --   --  6  --  12   AST 18 14 15  --   --  22 16  --   --  21  --  23   ALBUMIN 3.4 3.0* 3.1*  --   --  3.1* 2.6*  --   --  2.7*  --  2.5*   PREALB  --   --  17.1  --   --   --   --   --   --   --  12.3*  --    CRP  --   --  69.90*  --   --   --   --   --   --   --   --  115.20*   WBC 9.61 8.47  --  7.80 8.51 10.17  --  7.83 7.50 6.34  --  6.70   HGB 9.7* 8.4*  --  5.8* 8.7* 9.3*  --  7.3* 8.3* 8.0*  --  7.4*   HCT 30.8* 27.0*  --  18.1* 26.2* 28.4*  --  22.5* 25.9* 24.8*  --  22.9*     Nutrition Orders:  Diet diabetic 2000 Calories (up to 75 gm per meal)      Appetite/Oral Intake: fair/50-75% of meals  Factors Affecting Nutritional Intake: none identified  Food/Anabaptist/Cultural Preferences: none reported  Food Allergies: no known food allergies  Last Bowel Movement: 12/01/24  Wound(s):  surgical incision     Comments    12/2/24: Pt with fair intake of meals, tolerating diet.     Anthropometrics    Height: 4' 11" (149.9 cm), Height Method: Stated  Last Weight: 84.4 kg (186 lb) (11/26/24 2352), Weight Method: Bed Scale  BMI (Calculated): 37.5  BMI Classification: obese grade II (BMI 35-39.9)     Ideal Body Weight (IBW), Female: 95 lb     % Ideal Body Weight, Female (lb): 195.79 %                           "   Usual Weight Provided By: EMR weight history    Wt Readings from Last 5 Encounters:   11/26/24 84.4 kg (186 lb)   09/08/23 86.2 kg (190 lb)   07/10/17 85.9 kg (189 lb 6 oz)     Weight Change(s) Since Admission:   Wt Readings from Last 1 Encounters:   11/26/24 2352 84.4 kg (186 lb)   11/26/24 1956 99.8 kg (220 lb)   Admit Weight: 99.8 kg (220 lb) (11/26/24 1956), Weight Method: Bed Scale    220 lb wt most likely inaccurate    Patient Education     Not applicable.    Nutrition Goals & Monitoring     Dietitian will monitor: energy intake and weight    Nutrition Risk/Follow-Up: low (follow-up in 5-7 days)  Patients assigned 'low nutrition risk' status do not qualify for a full nutritional assessment but will be monitored and re-evaluated in a 5-7 day time period. Please consult if re-evaluation needed sooner.    81.6

## 2024-12-02 NOTE — PT/OT/SLP PROGRESS
"Physical Therapy Treatment    Patient Name:  Gabriela Lozano   MRN:  [id number]    Recommendations:     Discharge therapy intensity: Moderate Intensity Therapy   Discharge Equipment Recommendations: to be determined by next level of care  Barriers to discharge: Impaired mobility    Assessment:     Gabriela Lozano is a 79 y.o. female admitted with a medical diagnosis of L femur fx s/p IMR. .  She presents with the following impairments/functional limitations: weakness, gait instability, impaired endurance, impaired balance, impaired functional mobility, pain, decreased lower extremity function, decreased ROM, orthopedic precautions .    Pt very limited by pain. Plan to time tx session with pain meds for better participation/tolerance. Pt declined chair T/F today and stated she would attempt tomorrow.     Pt son states pt has an older back injury that causes a "pinch of her sciatic nerve" and her pain will no numb or have increased pain. Son also reports pt left legs used to be her "good leg" and now it is injured as well.     Rehab Prognosis: Good; patient would benefit from acute skilled PT services to address these deficits and reach maximum level of function.    Recent Surgery: Procedure(s) (LRB):  INSERTION, INTRAMEDULLARY MARCOS, FEMUR, DISTAL, RETROGRADE (Left) 5 Days Post-Op    Plan:     During this hospitalization, patient would benefit from acute PT services 6 x/week to address the identified rehab impairments via gait training, therapeutic activities, therapeutic exercises and progress toward the following goals:    Plan of Care Expires:  12/29/24    Subjective     Chief Complaint:   Patient/Family Comments/goals:   Pain/Comfort:  Pain Rating 1: 7/10  Location - Side 1: Left  Location 1: leg  Pain Addressed 1: Reposition, Distraction      Objective:     Communicated with NSG prior to session.  Patient found HOB elevated with pulse ox (continuous), pressure relief boots, PureWick, telemetry upon PT " "entry to room.     General Precautions: Standard, fall  Orthopedic Precautions: LLE weight bearing as tolerated  Braces: N/A  Respiratory Status: Room air  Blood Pressure:   Skin Integrity: Visible skin intact      Functional Mobility:  Bed Mobility:     Rolling Left:  moderate assistance and 2 trials, don/doff brief  Rolling Right: maximal assistance and 2 trials, don/doff brief  Scooting: minimum assistance, maximal assistance, and pt Kash to scoot towards EOB and maxAx3 to scoot back fully onto EOB. (Pt bed too high from ground, new one ordered)  Supine to Sit: maximal assistance and of 2 persons  Sit to Supine: maximal assistance and of 2 persons  Transfers:     Sit to Stand:  maximal assistance and of 2 persons with rolling walker and 1 trial from EOB. Pt required extensive time to sit back onto EOB 2/2 "pain." Once pt sat back EOB she insisted on being "pulled back" onto EOB. Pt son assited with this. Required increased time.   Static sitting: pt sat EOB for roughly 12 min. Pt CGA-Kash.     Patient left with bed in chair position with all lines intact and call button in reach    GOALS:   Multidisciplinary Problems       Physical Therapy Goals          Problem: Physical Therapy    Goal Priority Disciplines Outcome Interventions   Physical Therapy Goal     PT, PT/OT Progressing    Description: Goals to be met by: 2024     Patient will increase functional independence with mobility by performin. Supine to sit with MInimal Assistance  2. Sit to supine with MInimal Assistance  3. Sit to stand transfer with Contact Guard Assistance  4. Gait  x 100 feet with Minimal Assistance using Rolling Walker.                          Time Tracking:     PT Received On: 24  PT Start Time: 917     PT Stop Time: 955  PT Total Time (min): 38 min     Billable Minutes: Therapeutic Activity 38    Treatment Type: Treatment  PT/PTA: PTA     Number of PTA visits since last PT visit: 2     2024   "

## 2024-12-02 NOTE — PROGRESS NOTES
Trauma Surgery   Progress Note  Patient Name: Gabriela Lozano                   : 1945     MRN: [id number]   Date of Admission: 2024  Code Status: Full Code  Date of Exam: 2024  HD#6  POD#5 Days Post-Op  Attending Provider: Umesh Pena MD    Subjective:   Interval history:  NAEON  AFVSS  Patient c/o left femur pain, states it is controlled with meds  Left thigh soft, compressible, dressings to surgical sites CDI  H&H 7.4/22.9, will continue to monitor    Home Meds:   Current Outpatient Medications   Medication Instructions    atorvastatin (LIPITOR) 40 mg, Oral, Daily    carvediloL (COREG) 6.25 mg, 2 times daily    clopidogreL (PLAVIX) 75 mg, Oral, Daily    gabapentin (NEURONTIN) 600 mg, 2 times daily    insulin glargine U-100 (Lantus) 30 Units, Subcutaneous, Daily    isosorbide mononitrate (IMDUR) 30 mg, Daily    lisinopriL (PRINIVIL,ZESTRIL) 20 mg, Oral, Daily    metFORMIN (GLUCOPHAGE) 1,000 mg, Oral, 2 times daily    traMADoL (ULTRAM) 50 mg, Oral, Daily PRN      Scheduled Meds:   carvediloL  6.25 mg Oral BID    docusate sodium  100 mg Oral BID    enoxparin  40 mg Subcutaneous Q12H    gabapentin  300 mg Oral TID    insulin glargine U-100  5 Units Subcutaneous QHS    isosorbide mononitrate  30 mg Oral Daily    levothyroxine  88 mcg Oral Before breakfast    methocarbamoL  500 mg Oral Q8H    polyethylene glycol  17 g Oral BID     Continuous Infusions:  PRN Meds:  Current Facility-Administered Medications:     0.9%  NaCl infusion (for blood administration), , Intravenous, Q24H PRN    dextrose 10%, 12.5 g, Intravenous, PRN    dextrose 10%, 25 g, Intravenous, PRN    glucose, 16 g, Oral, PRN    glucose, 24 g, Oral, PRN    insulin aspart U-100, 0-10 Units, Subcutaneous, QID (AC + HS) PRN    magnesium hydroxide 400 mg/5 ml, 30 mL, Oral, Daily PRN    melatonin, 6 mg, Oral, Nightly PRN    morphine, 2 mg, Intravenous, Q4H PRN    oxyCODONE, 5 mg, Oral, Q4H PRN    oxyCODONE, 10 mg, Oral, Q4H PRN    " Flushing PICC/Midline Protocol, , , Until Discontinued **AND** sodium chloride 0.9%, 10 mL, Intravenous, Q12H PRN     Objective:     VITAL SIGNS: 24 HR MIN & MAX LAST   Temp  Min: 98 °F (36.7 °C)  Max: 99.1 °F (37.3 °C)  98.9 °F (37.2 °C)   BP  Min: 92/44  Max: 150/71  114/68    Pulse  Min: 74  Max: 90  90    Resp  Min: 16  Max: 20  18    SpO2  Min: 92 %  Max: 96 %  95 %      HT: 4' 11" (149.9 cm)  WT: 84.4 kg (186 lb)  BMI: 37.5     Intake/output:  Intake/Output - Last 3 Shifts         11/30 0700  12/01 0659 12/01 0700 12/02 0659 12/02 0700  12/03 0659    P.O. 1400 1230     Total Intake(mL/kg) 1400 (16.6) 1230 (14.6)     Urine (mL/kg/hr) 1350 (0.7) 900 (0.4)     Emesis/NG output 0 0     Stool 0 0     Total Output 1350 900     Net +50 +330            Urine Occurrence 1 x 3 x     Stool Occurrence 0 x 1 x     Emesis Occurrence 0 x 0 x             Intake/Output Summary (Last 24 hours) at 12/2/2024 1230  Last data filed at 12/2/2024 0600  Gross per 24 hour   Intake 1230 ml   Output 900 ml   Net 330 ml         Lines/drains/airway:       Peripheral IV - Single Lumen 11/26/24 2130 20 G Anterior;Distal;Left Forearm (Active)   Site Assessment Clean;Dry;Intact;No redness;No swelling 11/28/24 0400   Line Securement Device Secured with sutureless device 11/27/24 2000   Extremity Assessment Distal to IV No abnormal discoloration;No redness;No swelling;No warmth 11/27/24 2000   Line Status Capped 11/28/24 0400   Dressing Status Clean;Dry;Intact 11/28/24 0400   Dressing Intervention Integrity maintained 11/28/24 0400   Number of days: 1       Physical examination:  Gen: NAD, AAOx3, answering questions appropriately  HEENT: NC  CV: RR  Resp: NWOB  Abd: S/NT/ND  Msk: moving all extremities spontaneously and purposefully. LLE dressings c/d/i  Neuro: CN II-XII grossly intact, GCS 15    Labs:  Renal:  Recent Labs     11/30/24  0411 12/01/24  0857 12/02/24  0403   BUN 37.6* 32.8* 33.7*   CREATININE 1.21* 0.99 0.98     No results for " "input(s): "LACTIC" in the last 72 hours.  FEN/GI:  Recent Labs     11/30/24  0411 12/01/24  0857 12/02/24  0403   * 134* 134*   K 5.0 4.7 4.6    102 102   CO2 23 25 26   CALCIUM 8.3* 8.8 9.1   MG 1.40*  --   --    PHOS 3.1  --   --    ALBUMIN 2.6* 2.7* 2.5*   BILITOT 0.5 0.7 0.8   AST 16 21 23   ALKPHOS 41 49 49   ALT <5 6 12     Heme:  Recent Labs     11/30/24  0412 11/30/24 1954 12/01/24  0857 12/02/24  0403   HGB 7.3* 8.3* 8.0* 7.4*   HCT 22.5* 25.9* 24.8* 22.9*   * 109* 155 168     ID:  Recent Labs     11/30/24  0412 11/30/24 1954 12/01/24  0857 12/02/24  0403   WBC 7.83 7.50 6.34 6.70     CBG:  Recent Labs     11/30/24  0411 12/01/24  0857 12/02/24  0403   GLUCOSE 235* 165* 160*      Recent Labs     11/30/24  1107 11/30/24  2149 12/01/24  0626 12/01/24  1212 12/01/24  1701 12/01/24  2107 12/01/24  2358 12/02/24  1115   POCTGLUCOSE 273* 288* 225* 267* 262* 228* 200* 277*      Cardiovascular:  No results for input(s): "TROPONINI", "CKTOTAL", "CKMB", "BNP" in the last 168 hours.  I have reviewed all pertinent lab results within the past 24 hours.    Imaging:  SURG FL Surgery Fluoro Usage   Final Result      X-Ray Femur 2 View Left   Final Result      As above.         Electronically signed by: David Lomeli   Date:    11/27/2024   Time:    12:36      CT Knee Without Contrast Left   Final Result      Distal femur fracture is described above         Electronically signed by: Jose Solis MD   Date:    11/26/2024   Time:    21:57      X-Ray Knee 3 View Left   Final Result      Displaced distal femur fracture.         Electronically signed by: Jose Solis MD   Date:    11/26/2024   Time:    21:30      X-Ray Hip 2 or 3 views Left with Pelvis when performed   Final Result      No acute abnormalities are seen         Electronically signed by: Jose Solis MD   Date:    11/26/2024   Time:    21:31      X-Ray Chest AP Portable   Final Result      No acute disease is seen         Electronically " signed by: Jose Solis MD   Date:    11/26/2024   Time:    21:22         I have reviewed all pertinent imaging results/findings within the past 24 hours.    Micro/Path/Other:  Microbiology Results (last 7 days)       ** No results found for the last 168 hours. **           Pathology Results  (Last 7 days)      None             Problems list:  Active Problem List with Overview Notes    Diagnosis Date Noted    Fall 11/30/2024    Acute blood loss anemia 11/30/2024    Type 2 diabetes mellitus, with long-term current use of insulin 11/30/2024    Unspecified fracture of lower end of left femur, initial encounter for closed fracture 11/26/2024      Active Diagnoses:    Diagnosis Date Noted POA    PRINCIPAL PROBLEM:  Unspecified fracture of lower end of left femur, initial encounter for closed fracture [S72.402A] 11/26/2024 Yes    Fall [W19.XXXA] 11/30/2024 Yes    Acute blood loss anemia [D62] 11/30/2024 No    Type 2 diabetes mellitus, with long-term current use of insulin [E11.9, Z79.4] 11/30/2024 Not Applicable      Problems Resolved During this Admission:       Assessment & Plan:   L femur fx s/p IMN    Left femur fracture  - Orthopedics following  - s/p IMN left femur, 11/27  - PT/OT  - ASA 81 mg BID on dishcarge  - WBAT LLE with FROM  - Outpatient follow up with Dr. Teixeira in 2 weeks     DM  - POC glucose AC & HS  - Increased sliding scale insulin to moderate; 11/30  - Lantus 5 units qHS; started 11/29     Acute blood loss anemia  - Transfused 2 PRBC 11/28; 1 U PRBC 11/30  - follow up AM CBC, trend H/H  - surgical sites soft and compressible with mild swelling noted to LLE     Hypomagnesemia   - Magnesium replaced 11/27     Fall  - Diabetic diet  - Daily labs  - MMPC  - Frequent IS  - Lovenox for VTE prophylaxis   - Fall precautions  - PT/OT  - CM for placement   - Resumed home medications as appropriate     Ankita Call [facility]  Trauma Surgery

## 2024-12-02 NOTE — PLAN OF CARE
@120 I received communication from [facility] that patient is clinically accepted, but they're now checking financials.

## 2024-12-03 LAB
ABO + RH BLD: NORMAL
ABO + RH BLD: NORMAL
ALBUMIN SERPL-MCNC: 2.4 G/DL (ref 3.4–4.8)
ALBUMIN/GLOB SERPL: 1 RATIO (ref 1.1–2)
ALP SERPL-CCNC: 56 UNIT/L (ref 40–150)
ALT SERPL-CCNC: 10 UNIT/L (ref 0–55)
ANION GAP SERPL CALC-SCNC: 7 MEQ/L
AST SERPL-CCNC: 20 UNIT/L (ref 5–34)
BASOPHILS # BLD AUTO: 0.02 X10(3)/MCL
BASOPHILS NFR BLD AUTO: 0.3 %
BILIRUB SERPL-MCNC: 0.8 MG/DL
BLD PROD TYP BPU: NORMAL
BLD PROD TYP BPU: NORMAL
BLOOD UNIT EXPIRATION DATE: NORMAL
BLOOD UNIT EXPIRATION DATE: NORMAL
BLOOD UNIT TYPE CODE: 600
BLOOD UNIT TYPE CODE: 600
BUN SERPL-MCNC: 31.7 MG/DL (ref 9.8–20.1)
CALCIUM SERPL-MCNC: 8.7 MG/DL (ref 8.4–10.2)
CHLORIDE SERPL-SCNC: 100 MMOL/L (ref 98–107)
CO2 SERPL-SCNC: 25 MMOL/L (ref 23–31)
CREAT SERPL-MCNC: 1.12 MG/DL (ref 0.55–1.02)
CREAT/UREA NIT SERPL: 28
CROSSMATCH INTERPRETATION: NORMAL
CROSSMATCH INTERPRETATION: NORMAL
DISPENSE STATUS: NORMAL
DISPENSE STATUS: NORMAL
EOSINOPHIL # BLD AUTO: 0.19 X10(3)/MCL (ref 0–0.9)
EOSINOPHIL NFR BLD AUTO: 2.5 %
ERYTHROCYTE [DISTWIDTH] IN BLOOD BY AUTOMATED COUNT: 21.7 % (ref 11.5–17)
GFR SERPLBLD CREATININE-BSD FMLA CKD-EPI: 50 ML/MIN/1.73/M2
GLOBULIN SER-MCNC: 2.4 GM/DL (ref 2.4–3.5)
GLUCOSE SERPL-MCNC: 188 MG/DL (ref 82–115)
HCT VFR BLD AUTO: 21.9 % (ref 37–47)
HGB BLD-MCNC: 6.9 G/DL (ref 12–16)
IMM GRANULOCYTES # BLD AUTO: 0.1 X10(3)/MCL (ref 0–0.04)
IMM GRANULOCYTES NFR BLD AUTO: 1.3 %
INR PPP: 1.1
LYMPHOCYTES # BLD AUTO: 2.08 X10(3)/MCL (ref 0.6–4.6)
LYMPHOCYTES NFR BLD AUTO: 27.7 %
MCH RBC QN AUTO: 27.1 PG (ref 27–31)
MCHC RBC AUTO-ENTMCNC: 31.5 G/DL (ref 33–36)
MCV RBC AUTO: 85.9 FL (ref 80–94)
MONOCYTES # BLD AUTO: 1.29 X10(3)/MCL (ref 0.1–1.3)
MONOCYTES NFR BLD AUTO: 17.2 %
NEUTROPHILS # BLD AUTO: 3.82 X10(3)/MCL (ref 2.1–9.2)
NEUTROPHILS NFR BLD AUTO: 51 %
NRBC BLD AUTO-RTO: 0.4 %
PLATELET # BLD AUTO: 180 X10(3)/MCL (ref 130–400)
PMV BLD AUTO: 9.4 FL (ref 7.4–10.4)
POCT GLUCOSE: 135 MG/DL (ref 70–110)
POCT GLUCOSE: 145 MG/DL (ref 70–110)
POCT GLUCOSE: 208 MG/DL (ref 70–110)
POCT GLUCOSE: 219 MG/DL (ref 70–110)
POTASSIUM SERPL-SCNC: 4.7 MMOL/L (ref 3.5–5.1)
PROT SERPL-MCNC: 4.8 GM/DL (ref 5.8–7.6)
PROTHROMBIN TIME: 13.8 SECONDS (ref 12.5–14.5)
RBC # BLD AUTO: 2.55 X10(6)/MCL (ref 4.2–5.4)
SODIUM SERPL-SCNC: 132 MMOL/L (ref 136–145)
UNIT NUMBER: NORMAL
UNIT NUMBER: NORMAL
WBC # BLD AUTO: 7.5 X10(3)/MCL (ref 4.5–11.5)

## 2024-12-03 PROCEDURE — 99233 SBSQ HOSP IP/OBS HIGH 50: CPT | Mod: ,,, | Performed by: SURGERY

## 2024-12-03 PROCEDURE — 25000003 PHARM REV CODE 250: Performed by: NURSE PRACTITIONER

## 2024-12-03 PROCEDURE — 80053 COMPREHEN METABOLIC PANEL: CPT | Performed by: NURSE PRACTITIONER

## 2024-12-03 PROCEDURE — 63600175 PHARM REV CODE 636 W HCPCS

## 2024-12-03 PROCEDURE — P9016 RBC LEUKOCYTES REDUCED: HCPCS

## 2024-12-03 PROCEDURE — 36415 COLL VENOUS BLD VENIPUNCTURE: CPT | Performed by: NURSE PRACTITIONER

## 2024-12-03 PROCEDURE — 25000003 PHARM REV CODE 250

## 2024-12-03 PROCEDURE — 63600175 PHARM REV CODE 636 W HCPCS: Performed by: NURSE PRACTITIONER

## 2024-12-03 PROCEDURE — 85610 PROTHROMBIN TIME: CPT

## 2024-12-03 PROCEDURE — 97530 THERAPEUTIC ACTIVITIES: CPT | Mod: CQ

## 2024-12-03 PROCEDURE — 85025 COMPLETE CBC W/AUTO DIFF WBC: CPT | Performed by: NURSE PRACTITIONER

## 2024-12-03 PROCEDURE — 11000001 HC ACUTE MED/SURG PRIVATE ROOM

## 2024-12-03 PROCEDURE — 36415 COLL VENOUS BLD VENIPUNCTURE: CPT

## 2024-12-03 PROCEDURE — 86923 COMPATIBILITY TEST ELECTRIC: CPT

## 2024-12-03 PROCEDURE — 97535 SELF CARE MNGMENT TRAINING: CPT | Mod: CO

## 2024-12-03 RX ORDER — HYDROCODONE BITARTRATE AND ACETAMINOPHEN 500; 5 MG/1; MG/1
TABLET ORAL
Status: DISCONTINUED | OUTPATIENT
Start: 2024-12-03 | End: 2024-12-05 | Stop reason: HOSPADM

## 2024-12-03 RX ORDER — MORPHINE SULFATE 4 MG/ML
4 INJECTION, SOLUTION INTRAMUSCULAR; INTRAVENOUS ONCE
Status: COMPLETED | OUTPATIENT
Start: 2024-12-03 | End: 2024-12-03

## 2024-12-03 RX ADMIN — Medication 6 MG: at 10:12

## 2024-12-03 RX ADMIN — CARVEDILOL 6.25 MG: 3.12 TABLET, FILM COATED ORAL at 10:12

## 2024-12-03 RX ADMIN — METHOCARBAMOL 500 MG: 500 TABLET ORAL at 10:12

## 2024-12-03 RX ADMIN — GABAPENTIN 300 MG: 300 CAPSULE ORAL at 03:12

## 2024-12-03 RX ADMIN — ENOXAPARIN SODIUM 40 MG: 40 INJECTION SUBCUTANEOUS at 08:12

## 2024-12-03 RX ADMIN — CARVEDILOL 6.25 MG: 3.12 TABLET, FILM COATED ORAL at 08:12

## 2024-12-03 RX ADMIN — ENOXAPARIN SODIUM 40 MG: 40 INJECTION SUBCUTANEOUS at 10:12

## 2024-12-03 RX ADMIN — METHOCARBAMOL 500 MG: 500 TABLET ORAL at 04:12

## 2024-12-03 RX ADMIN — OXYCODONE HYDROCHLORIDE 10 MG: 10 TABLET ORAL at 04:12

## 2024-12-03 RX ADMIN — OXYCODONE HYDROCHLORIDE 10 MG: 10 TABLET ORAL at 08:12

## 2024-12-03 RX ADMIN — OXYCODONE HYDROCHLORIDE 10 MG: 10 TABLET ORAL at 10:12

## 2024-12-03 RX ADMIN — MORPHINE SULFATE 4 MG: 4 INJECTION INTRAVENOUS at 06:12

## 2024-12-03 RX ADMIN — INSULIN GLARGINE 10 UNITS: 100 INJECTION, SOLUTION SUBCUTANEOUS at 10:12

## 2024-12-03 RX ADMIN — INSULIN ASPART 4 UNITS: 100 INJECTION, SOLUTION INTRAVENOUS; SUBCUTANEOUS at 06:12

## 2024-12-03 RX ADMIN — ISOSORBIDE MONONITRATE 30 MG: 30 TABLET, EXTENDED RELEASE ORAL at 08:12

## 2024-12-03 RX ADMIN — GABAPENTIN 300 MG: 300 CAPSULE ORAL at 08:12

## 2024-12-03 RX ADMIN — INSULIN ASPART 4 UNITS: 100 INJECTION, SOLUTION INTRAVENOUS; SUBCUTANEOUS at 12:12

## 2024-12-03 RX ADMIN — GABAPENTIN 300 MG: 300 CAPSULE ORAL at 10:12

## 2024-12-03 RX ADMIN — LEVOTHYROXINE SODIUM 88 MCG: 88 TABLET ORAL at 04:12

## 2024-12-03 RX ADMIN — METHOCARBAMOL 500 MG: 500 TABLET ORAL at 12:12

## 2024-12-03 RX ADMIN — OXYCODONE HYDROCHLORIDE 10 MG: 10 TABLET ORAL at 03:12

## 2024-12-03 NOTE — PROGRESS NOTES
Trauma Surgery   Progress Note  Patient Name: Gabriela Lozano                   : 1945     MRN: [id number]   Date of Admission: 2024  Code Status: Full Code  Date of Exam: 2024  HD#7  POD#6 Days Post-Op  Attending Provider: Umesh Pena MD    Subjective:   Interval history:  NAEON  AFVSS  Patient c/o left femur pain, states it is controlled with meds  Left thigh soft, compressible, dressings to surgical sites CDI  H&H 6.9 blood transfusing during times of rounds.   Creat slightly elevated. Sats low 90s. BP stable.    Home Meds:   Current Outpatient Medications   Medication Instructions    atorvastatin (LIPITOR) 40 mg, Oral, Daily    carvediloL (COREG) 6.25 mg, 2 times daily    clopidogreL (PLAVIX) 75 mg, Oral, Daily    gabapentin (NEURONTIN) 600 mg, 2 times daily    insulin glargine U-100 (Lantus) 30 Units, Subcutaneous, Daily    isosorbide mononitrate (IMDUR) 30 mg, Daily    lisinopriL (PRINIVIL,ZESTRIL) 20 mg, Oral, Daily    metFORMIN (GLUCOPHAGE) 1,000 mg, Oral, 2 times daily    traMADoL (ULTRAM) 50 mg, Oral, Daily PRN      Scheduled Meds:   carvediloL  6.25 mg Oral BID    docusate sodium  100 mg Oral BID    enoxparin  40 mg Subcutaneous Q12H    gabapentin  300 mg Oral TID    insulin glargine U-100  10 Units Subcutaneous QHS    isosorbide mononitrate  30 mg Oral Daily    levothyroxine  88 mcg Oral Before breakfast    methocarbamoL  500 mg Oral Q8H    polyethylene glycol  17 g Oral BID     Continuous Infusions:  PRN Meds:  Current Facility-Administered Medications:     0.9%  NaCl infusion (for blood administration), , Intravenous, Q24H PRN    dextrose 10%, 12.5 g, Intravenous, PRN    dextrose 10%, 25 g, Intravenous, PRN    glucose, 16 g, Oral, PRN    glucose, 24 g, Oral, PRN    insulin aspart U-100, 0-10 Units, Subcutaneous, QID (AC + HS) PRN    magnesium hydroxide 400 mg/5 ml, 30 mL, Oral, Daily PRN    melatonin, 6 mg, Oral, Nightly PRN    oxyCODONE, 5 mg, Oral, Q4H PRN     "oxyCODONE, 10 mg, Oral, Q4H PRN    Flushing PICC/Midline Protocol, , , Until Discontinued **AND** sodium chloride 0.9%, 10 mL, Intravenous, Q12H PRN     Objective:     VITAL SIGNS: 24 HR MIN & MAX LAST   Temp  Min: 98 °F (36.7 °C)  Max: 99.7 °F (37.6 °C)  98.3 °F (36.8 °C)   BP  Min: 102/67  Max: 143/60  118/73    Pulse  Min: 77  Max: 96  78    Resp  Min: 16  Max: 20  16    SpO2  Min: 92 %  Max: 96 %  95 %      HT: 4' 11" (149.9 cm)  WT: 84.4 kg (186 lb)  BMI: 37.5     Intake/output:  Intake/Output - Last 3 Shifts         12/01 0700  12/02 0659 12/02 0700  12/03 0659    P.O. 1230 240    Total Intake(mL/kg) 1230 (14.6) 240 (2.8)    Urine (mL/kg/hr) 900 (0.4) 550 (0.3)    Emesis/NG output 0     Stool 0 0    Total Output 900 550    Net +330 -310          Urine Occurrence 3 x     Stool Occurrence 1 x 0 x    Emesis Occurrence 0 x             Intake/Output Summary (Last 24 hours) at 12/3/2024 0618  Last data filed at 12/2/2024 1500  Gross per 24 hour   Intake 240 ml   Output 550 ml   Net -310 ml         Lines/drains/airway:       Peripheral IV - Single Lumen 11/26/24 2130 20 G Anterior;Distal;Left Forearm (Active)   Site Assessment Clean;Dry;Intact;No redness;No swelling 11/28/24 0400   Line Securement Device Secured with sutureless device 11/27/24 2000   Extremity Assessment Distal to IV No abnormal discoloration;No redness;No swelling;No warmth 11/27/24 2000   Line Status Capped 11/28/24 0400   Dressing Status Clean;Dry;Intact 11/28/24 0400   Dressing Intervention Integrity maintained 11/28/24 0400   Number of days: 1       Physical examination:  Gen: NAD, AAOx3, answering questions appropriately  HEENT: NC  CV: RR  Resp: NWOB  Abd: S/NT/ND  Msk: moving all extremities spontaneously and purposefully. LLE dressings c/d/I. LLE slightly more swollen than RLE.   Neuro: CN II-XII grossly intact, GCS 15    Labs:  Renal:  Recent Labs     12/01/24  0857 12/02/24  0403 12/03/24  0354   BUN 32.8* 33.7* 31.7*   CREATININE 0.99 " "0.98 1.12*     No results for input(s): "LACTIC" in the last 72 hours.  FEN/GI:  Recent Labs     12/01/24  0857 12/02/24  0403 12/03/24  0354   * 134* 132*   K 4.7 4.6 4.7    102 100   CO2 25 26 25   CALCIUM 8.8 9.1 8.7   ALBUMIN 2.7* 2.5* 2.4*   BILITOT 0.7 0.8 0.8   AST 21 23 20   ALKPHOS 49 49 56   ALT 6 12 10     Heme:  Recent Labs     11/30/24 1954 12/01/24  0857 12/02/24  0403 12/03/24  0017 12/03/24  0354   HGB 8.3* 8.0* 7.4*  --  6.9*   HCT 25.9* 24.8* 22.9*  --  21.9*   * 155 168  --  180   INR  --   --   --  1.1  --      ID:  Recent Labs     11/30/24 1954 12/01/24  0857 12/02/24  0403 12/03/24  0354   WBC 7.50 6.34 6.70 7.50     CBG:  Recent Labs     12/01/24  0857 12/02/24  0403 12/03/24  0354   GLUCOSE 165* 160* 188*      Recent Labs     12/01/24  1212 12/01/24  1701 12/01/24  2107 12/01/24  2358 12/02/24  1115 12/02/24  1722 12/02/24  2020 12/03/24  0543   POCTGLUCOSE 267* 262* 228* 200* 277* 283* 269* 208*      Cardiovascular:  No results for input(s): "TROPONINI", "CKTOTAL", "CKMB", "BNP" in the last 168 hours.  I have reviewed all pertinent lab results within the past 24 hours.    Imaging:  SURG FL Surgery Fluoro Usage   Final Result      X-Ray Femur 2 View Left   Final Result      As above.         Electronically signed by: David Lomeli   Date:    11/27/2024   Time:    12:36      CT Knee Without Contrast Left   Final Result      Distal femur fracture is described above         Electronically signed by: Jose Solis MD   Date:    11/26/2024   Time:    21:57      X-Ray Knee 3 View Left   Final Result      Displaced distal femur fracture.         Electronically signed by: Jose Solis MD   Date:    11/26/2024   Time:    21:30      X-Ray Hip 2 or 3 views Left with Pelvis when performed   Final Result      No acute abnormalities are seen         Electronically signed by: Jose Solis MD   Date:    11/26/2024   Time:    21:31      X-Ray Chest AP Portable   Final Result      No " acute disease is seen         Electronically signed by: Jose Solis MD   Date:    11/26/2024   Time:    21:22         I have reviewed all pertinent imaging results/findings within the past 24 hours.    Micro/Path/Other:  Microbiology Results (last 7 days)       ** No results found for the last 168 hours. **           Pathology Results  (Last 7 days)      None             Problems list:  Active Problem List with Overview Notes    Diagnosis Date Noted    Fall 11/30/2024    Acute blood loss anemia 11/30/2024    Type 2 diabetes mellitus, with long-term current use of insulin 11/30/2024    Unspecified fracture of lower end of left femur, initial encounter for closed fracture 11/26/2024      Active Diagnoses:    Diagnosis Date Noted POA    PRINCIPAL PROBLEM:  Unspecified fracture of lower end of left femur, initial encounter for closed fracture [S72.402A] 11/26/2024 Yes    Fall [W19.XXXA] 11/30/2024 Yes    Acute blood loss anemia [D62] 11/30/2024 No    Type 2 diabetes mellitus, with long-term current use of insulin [E11.9, Z79.4] 11/30/2024 Not Applicable      Problems Resolved During this Admission:       Assessment & Plan:   L femur fx s/p IMN    Left femur fracture  - Orthopedics following  - s/p IMN left femur, 11/27  - PT/OT  - ASA 81 mg BID on dishcarge  - WBAT LLE with FROM  - Outpatient follow up with Dr. Teixeira in 2 weeks     DM  - POC glucose AC & HS  - Increased sliding scale insulin to moderate; 11/30  - Lantus 5 units qHS; started 11/29     Acute blood loss anemia  - Transfused 2 PRBC 11/28; 1 U PRBC 11/30,  2 units 12/3  - Daily CBC  - surgical sites soft and compressible with mild swelling noted to LLE thigh     Hypomagnesemia   - Magnesium replaced 11/27     Fall  - Diabetic diet  - Daily labs  - MMPC  - Frequent IS  - Lovenox for VTE prophylaxis   - Fall precautions  - PT/OT  - CM for placement   - Resumed home medications as appropriate  - Increased long acting Insulin 12/3     Scooter Lima

## 2024-12-03 NOTE — PLAN OF CARE
DANA sent clinical updates to Sunday at [facility] via "[employer] Fax. Talked to Ashley with [facility] she requested the last 5 days of clinical notes. Requested information was sent via [employer] Fax.

## 2024-12-03 NOTE — PLAN OF CARE
Problem: Adult Inpatient Plan of Care  Goal: Plan of Care Review  Outcome: Progressing  Goal: Patient-Specific Goal (Individualized)  Outcome: Progressing  Goal: Absence of Hospital-Acquired Illness or Injury  Outcome: Progressing  Goal: Optimal Comfort and Wellbeing  Outcome: Progressing  Goal: Readiness for Transition of Care  Outcome: Progressing     Problem: Bariatric Environmental Safety  Goal: Safety Maintained with Care  Outcome: Progressing     Problem: Fall Injury Risk  Goal: Absence of Fall and Fall-Related Injury  Outcome: Progressing     Problem: Wound  Goal: Optimal Coping  Outcome: Progressing  Goal: Optimal Functional Ability  Outcome: Progressing  Goal: Absence of Infection Signs and Symptoms  Outcome: Progressing  Goal: Improved Oral Intake  Outcome: Progressing  Goal: Optimal Pain Control and Function  Outcome: Progressing  Goal: Skin Health and Integrity  Outcome: Progressing  Goal: Optimal Wound Healing  Outcome: Progressing     Problem: Diabetes Comorbidity  Goal: Blood Glucose Level Within Targeted Range  Outcome: Progressing     Problem: Infection  Goal: Absence of Infection Signs and Symptoms  Outcome: Progressing     Problem: Skin Injury Risk Increased  Goal: Skin Health and Integrity  Reactivated

## 2024-12-03 NOTE — PT/OT/SLP PROGRESS
"Physical Therapy Treatment    Patient Name:  Gabriela Lozano   MRN:  [id number]    Recommendations:     Discharge therapy intensity: Moderate Intensity Therapy   Discharge Equipment Recommendations: to be determined by next level of care  Barriers to discharge: Impaired mobility    Assessment:     Gabriela Lozano is a 79 y.o. female admitted with a medical diagnosis of L femur fx s/p IMR. .  She presents with the following impairments/functional limitations: weakness, gait instability, impaired endurance, impaired balance, impaired functional mobility, pain, decreased lower extremity function, decreased ROM, orthopedic precautions .    Pt premedicated prior to PT tx. Able to T/F to bedside chair via squat pivot. Plan to attempt SB T/F with pt tomorrow.     Pt bed switched to new one during tx session.       Pt son states pt has an older back injury that causes a "pinch of her sciatic nerve" and her pain will no numb or have increased pain. Son also reports pt left legs used to be her "good leg" and now it is injured as well.     Rehab Prognosis: Good; patient would benefit from acute skilled PT services to address these deficits and reach maximum level of function.    Recent Surgery: Procedure(s) (LRB):  INSERTION, INTRAMEDULLARY MARCOS, FEMUR, DISTAL, RETROGRADE (Left) 6 Days Post-Op    Plan:     During this hospitalization, patient would benefit from acute PT services 6 x/week to address the identified rehab impairments via gait training, therapeutic activities, therapeutic exercises and progress toward the following goals:    Plan of Care Expires:  12/29/24    Subjective     Chief Complaint:   Patient/Family Comments/goals:   Pain/Comfort:  Location - Side 1: Bilateral  Location 1: leg  Pain Addressed 1: Reposition, Distraction, Pre-medicate for activity      Objective:     Communicated with NSG prior to session.  Patient found HOB elevated with pulse ox (continuous), pressure relief boots, PureWick, " telemetry upon PT entry to room.     General Precautions: Standard, fall  Orthopedic Precautions: LLE weight bearing as tolerated  Braces: N/A  Respiratory Status: Room air  Blood Pressure:   Skin Integrity: Visible skin intact      Functional Mobility:  Bed Mobility:     Rolling Left:  moderate assistance and 2 trials, don/doff brief  Rolling Right: maximal assistance and 2 trials, don/doff brief  Scooting: minimum assistance, maximal assistance, and pt Kash to scoot towards EOB   Supine to Sit: maximal assistance and of 2 persons  Sit to Supine: maximal assistance and of 2 persons  Transfers:     Sit to Stand:  maximal assistance and of 2 persons with rolling walker and 1 trial from bedside chair   Bed to Chair: maximal assistance and of 2 persons with  no AD  using  Squat Pivot    Patient left up in chair with all lines intact, call button in reach, and cornelio pad in place    GOALS:   Multidisciplinary Problems       Physical Therapy Goals          Problem: Physical Therapy    Goal Priority Disciplines Outcome Interventions   Physical Therapy Goal     PT, PT/OT Progressing    Description: Goals to be met by: 2024     Patient will increase functional independence with mobility by performin. Supine to sit with MInimal Assistance  2. Sit to supine with MInimal Assistance  3. Sit to stand transfer with Contact Guard Assistance  4. Gait  x 100 feet with Minimal Assistance using Rolling Walker.                          Time Tracking:     PT Received On: 24  PT Start Time: 846     PT Stop Time: 928  PT Total Time (min): 42 min     Billable Minutes: Therapeutic Activity 42    Treatment Type: Treatment  PT/PTA: PTA     Number of PTA visits since last PT visit: 3     2024

## 2024-12-03 NOTE — PT/OT/SLP PROGRESS
Occupational Therapy   Treatment    Name: Gabriela Lozano  MRN: [id number]  Admitting Diagnosis:  Unspecified fracture of lower end of left femur, initial encounter for closed fracture s/p IMP  6 Days Post-Op    Recommendations:     Recommended therapy intensity at discharge: Moderate Intensity Therapy   Discharge Equipment Recommendations:  to be determined by next level of care  Barriers to discharge:       Assessment:     Gabriela Lozano is a 79 y.o. female with a medical diagnosis of Unspecified fracture of lower end of left femur, initial encounter for closed fracture as a result of a fall.  Performance deficits affecting function are weakness, impaired endurance, impaired self care skills, impaired functional mobility, gait instability, impaired balance, decreased safety awareness, pain, decreased lower extremity function.     Rehab Prognosis:  Good; patient would benefit from acute skilled OT services to address these deficits and reach maximum level of function.       Plan:     Patient to be seen 6 x/week to address the above listed problems via self-care/home management, therapeutic activities, therapeutic exercises  Plan of Care Expires: 12/27/24  Plan of Care Reviewed with: patient, son    Subjective     Pain/Comfort:  Pain Rating 1:  (non-rated; verbalized increased BLE pain with mobilization)  Location - Side 1: Bilateral  Location 1: leg  Pain Addressed 1: Reposition, Distraction    Objective:     Communicated with: RN prior to session.  Patient found HOB elevated with PureWick upon OT entry to room. Son present in room. Son translating for pt throughout session.     General Precautions: Standard, fall    Orthopedic Precautions:LLE weight bearing as tolerated  Braces: N/A  Respiratory Status: Room air     Occupational Performance:     Bed Mobility:    Patient completed Rolling/Turning to Left with  moderate assistance and Pt able to flex R knee and begin reaching toward guardrail to assist    Patient completed Rolling/Turning to Right with maximal assistance  Patient completed Scooting/Bridging with maximal assistance  Patient completed Supine to Sit with maximal assistance and 2 persons  Patient completed Sit to Supine with maximal assistance and 2 persons   Pt required mod-A X2 persons for scooting hips out to EOB. Pt able to assist in scooting B hips out utilizing BUE and weight shifts, limited 2/2 BLE pain.    Functional Mobility/Transfers:  Patient completed Bed>Chair transfer utilizing Squat-Pivot with gait belt donned for increased safety, requiring Max-A X2 persons  Pt completed Sit>Stand t/f from chair for therapist to adjust pads, requiring Max-A X2 with RW    Activities of Daily Living:  Socks donned in bed via Total-A  Brief donned in bed utilizing L<>R rolls, requiring Total-A  Pt performed grooming ADLs while seated in chair requiring set-up assist for oral hygiene and washing of face utilizing wet washcloth.       Therapeutic Positioning    OT interventions performed during the course of today's session in an effort to prevent and/or reduce acquired pressure injuries:   Education was provided on benefits of and recommendations for therapeutic positioning  Therapeutic positioning was provided at the conclusion of session to offload all bony prominences for the prevention and/or reduction of pressure injuries and for pain management      [facility] 6 Click ADL: 18    Patient Education:  Patient and son/s were provided with verbal education education regarding OT role/goals/POC, post op precautions, fall prevention, safety awareness, and pressure ulcer prevention.  Understanding was verbalized, however additional teaching warranted.      Patient left up in chair with all lines intact, call button in reach, son present, and therapist recommended to pt and son that pt stay up in chair for ~2 hours but to use call button to call the nurse for transferring back to bed when desired .    GOALS:    Multidisciplinary Problems       Occupational Therapy Goals          Problem: Occupational Therapy    Goal Priority Disciplines Outcome Interventions   Occupational Therapy Goal     OT, PT/OT Progressing    Description: Goals to be met by: 12/27/24     Patient will increase functional independence with ADLs by performing:    LE Dressing with Modified [place].  Grooming while standing with Modified [place].  Toileting from bedside commode with Modified [place] for hygiene and clothing management.   Toilet transfer to bedside commode with Modified [place]. Progress as appropriate.                          Time Tracking:     OT Date of Treatment: 12/03/24  OT Start Time: 0846  OT Stop Time: 0930  OT Total Time (min): 44 min    Billable Minutes:Self Care/Home Management 3    OT/NOEMI: NOEMI     Number of NOEMI visits since last OT visit: 2    12/3/2024

## 2024-12-04 LAB
ABS NEUT (OLG): 6.15 X10(3)/MCL (ref 2.1–9.2)
ALBUMIN SERPL-MCNC: 2.6 G/DL (ref 3.4–4.8)
ALBUMIN/GLOB SERPL: 1 RATIO (ref 1.1–2)
ALP SERPL-CCNC: 61 UNIT/L (ref 40–150)
ALT SERPL-CCNC: 10 UNIT/L (ref 0–55)
ANION GAP SERPL CALC-SCNC: 8 MEQ/L
AST SERPL-CCNC: 21 UNIT/L (ref 5–34)
BILIRUB SERPL-MCNC: 1.5 MG/DL
BUN SERPL-MCNC: 27.1 MG/DL (ref 9.8–20.1)
CALCIUM SERPL-MCNC: 8.9 MG/DL (ref 8.4–10.2)
CHLORIDE SERPL-SCNC: 96 MMOL/L (ref 98–107)
CO2 SERPL-SCNC: 25 MMOL/L (ref 23–31)
CREAT SERPL-MCNC: 0.94 MG/DL (ref 0.55–1.02)
CREAT/UREA NIT SERPL: 29
EOSINOPHIL NFR BLD MANUAL: 0.1 X10(3)/MCL (ref 0–0.9)
EOSINOPHIL NFR BLD MANUAL: 1 %
ERYTHROCYTE [DISTWIDTH] IN BLOOD BY AUTOMATED COUNT: 20.2 % (ref 11.5–17)
GFR SERPLBLD CREATININE-BSD FMLA CKD-EPI: >60 ML/MIN/1.73/M2
GLOBULIN SER-MCNC: 2.6 GM/DL (ref 2.4–3.5)
GLUCOSE SERPL-MCNC: 160 MG/DL (ref 82–115)
HCT VFR BLD AUTO: 33.1 % (ref 37–47)
HGB BLD-MCNC: 10.8 G/DL (ref 12–16)
INSTRUMENT WBC (OLG): 9.92 X10(3)/MCL
LYMPHOCYTES NFR BLD MANUAL: 1.69 X10(3)/MCL
LYMPHOCYTES NFR BLD MANUAL: 17 %
MCH RBC QN AUTO: 27.2 PG (ref 27–31)
MCHC RBC AUTO-ENTMCNC: 32.6 G/DL (ref 33–36)
MCV RBC AUTO: 83.4 FL (ref 80–94)
MONOCYTES NFR BLD MANUAL: 1.79 X10(3)/MCL (ref 0.1–1.3)
MONOCYTES NFR BLD MANUAL: 18 %
MYELOCYTES NFR BLD MANUAL: 1 %
NEUTROPHILS NFR BLD MANUAL: 62 %
NRBC BLD AUTO-RTO: 0.2 %
PLATELET # BLD AUTO: 230 X10(3)/MCL (ref 130–400)
PLATELET # BLD EST: NORMAL 10*3/UL
PMV BLD AUTO: 9.3 FL (ref 7.4–10.4)
POCT GLUCOSE: 148 MG/DL (ref 70–110)
POCT GLUCOSE: 152 MG/DL (ref 70–110)
POCT GLUCOSE: 208 MG/DL (ref 70–110)
POCT GLUCOSE: 219 MG/DL (ref 70–110)
POTASSIUM SERPL-SCNC: 5 MMOL/L (ref 3.5–5.1)
PROT SERPL-MCNC: 5.2 GM/DL (ref 5.8–7.6)
RBC # BLD AUTO: 3.97 X10(6)/MCL (ref 4.2–5.4)
RBC MORPH BLD: NORMAL
SODIUM SERPL-SCNC: 129 MMOL/L (ref 136–145)
WBC # BLD AUTO: 9.92 X10(3)/MCL (ref 4.5–11.5)

## 2024-12-04 PROCEDURE — 99900031 HC PATIENT EDUCATION (STAT)

## 2024-12-04 PROCEDURE — 97535 SELF CARE MNGMENT TRAINING: CPT | Mod: CO

## 2024-12-04 PROCEDURE — 94799 UNLISTED PULMONARY SVC/PX: CPT

## 2024-12-04 PROCEDURE — 94760 N-INVAS EAR/PLS OXIMETRY 1: CPT

## 2024-12-04 PROCEDURE — 36415 COLL VENOUS BLD VENIPUNCTURE: CPT | Performed by: NURSE PRACTITIONER

## 2024-12-04 PROCEDURE — 63600175 PHARM REV CODE 636 W HCPCS: Performed by: NURSE PRACTITIONER

## 2024-12-04 PROCEDURE — 63600175 PHARM REV CODE 636 W HCPCS

## 2024-12-04 PROCEDURE — 99233 SBSQ HOSP IP/OBS HIGH 50: CPT | Mod: ,,, | Performed by: SURGERY

## 2024-12-04 PROCEDURE — 99900035 HC TECH TIME PER 15 MIN (STAT)

## 2024-12-04 PROCEDURE — 80053 COMPREHEN METABOLIC PANEL: CPT | Performed by: NURSE PRACTITIONER

## 2024-12-04 PROCEDURE — 85025 COMPLETE CBC W/AUTO DIFF WBC: CPT | Performed by: NURSE PRACTITIONER

## 2024-12-04 PROCEDURE — 97530 THERAPEUTIC ACTIVITIES: CPT | Mod: CQ

## 2024-12-04 PROCEDURE — 25000003 PHARM REV CODE 250

## 2024-12-04 PROCEDURE — 11000001 HC ACUTE MED/SURG PRIVATE ROOM

## 2024-12-04 PROCEDURE — 25000003 PHARM REV CODE 250: Performed by: NURSE PRACTITIONER

## 2024-12-04 RX ADMIN — ISOSORBIDE MONONITRATE 30 MG: 30 TABLET, EXTENDED RELEASE ORAL at 08:12

## 2024-12-04 RX ADMIN — Medication 6 MG: at 08:12

## 2024-12-04 RX ADMIN — GABAPENTIN 300 MG: 300 CAPSULE ORAL at 08:12

## 2024-12-04 RX ADMIN — INSULIN ASPART 4 UNITS: 100 INJECTION, SOLUTION INTRAVENOUS; SUBCUTANEOUS at 04:12

## 2024-12-04 RX ADMIN — METHOCARBAMOL 500 MG: 500 TABLET ORAL at 08:12

## 2024-12-04 RX ADMIN — ENOXAPARIN SODIUM 40 MG: 40 INJECTION SUBCUTANEOUS at 08:12

## 2024-12-04 RX ADMIN — CARVEDILOL 6.25 MG: 3.12 TABLET, FILM COATED ORAL at 08:12

## 2024-12-04 RX ADMIN — LEVOTHYROXINE SODIUM 88 MCG: 88 TABLET ORAL at 05:12

## 2024-12-04 RX ADMIN — METHOCARBAMOL 500 MG: 500 TABLET ORAL at 03:12

## 2024-12-04 RX ADMIN — METHOCARBAMOL 500 MG: 500 TABLET ORAL at 05:12

## 2024-12-04 RX ADMIN — INSULIN ASPART 4 UNITS: 100 INJECTION, SOLUTION INTRAVENOUS; SUBCUTANEOUS at 11:12

## 2024-12-04 RX ADMIN — DOCUSATE SODIUM 100 MG: 100 CAPSULE, LIQUID FILLED ORAL at 08:12

## 2024-12-04 RX ADMIN — OXYCODONE HYDROCHLORIDE 15 MG: 5 TABLET ORAL at 08:12

## 2024-12-04 RX ADMIN — OXYCODONE HYDROCHLORIDE 10 MG: 10 TABLET ORAL at 05:12

## 2024-12-04 RX ADMIN — OXYCODONE HYDROCHLORIDE 10 MG: 10 TABLET ORAL at 09:12

## 2024-12-04 RX ADMIN — GABAPENTIN 300 MG: 300 CAPSULE ORAL at 03:12

## 2024-12-04 NOTE — PLAN OF CARE
Problem: Adult Inpatient Plan of Care  Goal: Plan of Care Review  Outcome: Progressing  Goal: Patient-Specific Goal (Individualized)  Outcome: Progressing  Goal: Absence of Hospital-Acquired Illness or Injury  Outcome: Progressing  Goal: Optimal Comfort and Wellbeing  Outcome: Progressing  Goal: Readiness for Transition of Care  Outcome: Progressing     Problem: Bariatric Environmental Safety  Goal: Safety Maintained with Care  Outcome: Progressing     Problem: Fall Injury Risk  Goal: Absence of Fall and Fall-Related Injury  Outcome: Progressing     Problem: Wound  Goal: Optimal Coping  Outcome: Progressing  Goal: Optimal Functional Ability  Outcome: Progressing  Goal: Absence of Infection Signs and Symptoms  Outcome: Progressing  Goal: Improved Oral Intake  Outcome: Progressing  Goal: Optimal Pain Control and Function  Outcome: Progressing  Goal: Skin Health and Integrity  Outcome: Progressing  Goal: Optimal Wound Healing  Outcome: Progressing     Problem: Diabetes Comorbidity  Goal: Blood Glucose Level Within Targeted Range  Outcome: Progressing     Problem: Infection  Goal: Absence of Infection Signs and Symptoms  Outcome: Progressing     Problem: Skin Injury Risk Increased  Goal: Skin Health and Integrity  Outcome: Progressing

## 2024-12-04 NOTE — PROGRESS NOTES
Trauma Surgery   Progress Note  Patient Name: Gabriela Lozano                   : 1945     MRN: [id number]   Date of Admission: 2024  Code Status: Full Code  Date of Exam: 2024  HD#8  POD#7 Days Post-Op  Attending Provider: Umesh Pena MD    Subjective:   Interval history:  NAEON  AFVSS  Patient c/o left femur pain, states it is controlled with meds  H&H improved after transfusion yesterday   Creat improving. Sats mid 90s. BP stable.    Home Meds:   Current Outpatient Medications   Medication Instructions    atorvastatin (LIPITOR) 40 mg, Oral, Daily    carvediloL (COREG) 6.25 mg, 2 times daily    clopidogreL (PLAVIX) 75 mg, Oral, Daily    gabapentin (NEURONTIN) 600 mg, 2 times daily    insulin glargine U-100 (Lantus) 30 Units, Subcutaneous, Daily    isosorbide mononitrate (IMDUR) 30 mg, Daily    lisinopriL (PRINIVIL,ZESTRIL) 20 mg, Oral, Daily    metFORMIN (GLUCOPHAGE) 1,000 mg, Oral, 2 times daily    traMADoL (ULTRAM) 50 mg, Oral, Daily PRN      Scheduled Meds:   carvediloL  6.25 mg Oral BID    docusate sodium  100 mg Oral BID    enoxparin  40 mg Subcutaneous Q12H    gabapentin  300 mg Oral TID    insulin glargine U-100  10 Units Subcutaneous QHS    isosorbide mononitrate  30 mg Oral Daily    levothyroxine  88 mcg Oral Before breakfast    methocarbamoL  500 mg Oral Q8H    polyethylene glycol  17 g Oral BID     Continuous Infusions:  PRN Meds:  Current Facility-Administered Medications:     0.9%  NaCl infusion (for blood administration), , Intravenous, Q24H PRN    dextrose 10%, 12.5 g, Intravenous, PRN    dextrose 10%, 25 g, Intravenous, PRN    glucose, 16 g, Oral, PRN    glucose, 24 g, Oral, PRN    insulin aspart U-100, 0-10 Units, Subcutaneous, QID (AC + HS) PRN    magnesium hydroxide 400 mg/5 ml, 30 mL, Oral, Daily PRN    melatonin, 6 mg, Oral, Nightly PRN    oxyCODONE, 5 mg, Oral, Q4H PRN    oxyCODONE, 10 mg, Oral, Q4H PRN    Flushing PICC/Midline Protocol, , , Until Discontinued  "**AND** sodium chloride 0.9%, 10 mL, Intravenous, Q12H PRN     Objective:     VITAL SIGNS: 24 HR MIN & MAX LAST   Temp  Min: 98 °F (36.7 °C)  Max: 99.7 °F (37.6 °C)  99.6 °F (37.6 °C)   BP  Min: 114/69  Max: 163/80  (!) 160/67    Pulse  Min: 77  Max: 99  97    Resp  Min: 16  Max: 20  16    SpO2  Min: 93 %  Max: 97 %  96 %      HT: 4' 11" (149.9 cm)  WT: 84.4 kg (186 lb)  BMI: 37.5     Intake/output:  Intake/Output - Last 3 Shifts         12/02 0700 12/03 0659 12/03 0700 12/04 0659 12/04 0700  12/05 0659    P.O. 720 960     Total Intake(mL/kg) 720 (8.5) 960 (11.4)     Urine (mL/kg/hr) 550 (0.3) 1400 (0.7)     Emesis/NG output       Stool 0 0     Total Output 550 1400     Net +170 -440            Stool Occurrence 0 x 3 x             Intake/Output Summary (Last 24 hours) at 12/4/2024 0710  Last data filed at 12/4/2024 0631  Gross per 24 hour   Intake 960 ml   Output 1400 ml   Net -440 ml         Lines/drains/airway:       Peripheral IV - Single Lumen 11/26/24 2130 20 G Anterior;Distal;Left Forearm (Active)   Site Assessment Clean;Dry;Intact;No redness;No swelling 11/28/24 0400   Line Securement Device Secured with sutureless device 11/27/24 2000   Extremity Assessment Distal to IV No abnormal discoloration;No redness;No swelling;No warmth 11/27/24 2000   Line Status Capped 11/28/24 0400   Dressing Status Clean;Dry;Intact 11/28/24 0400   Dressing Intervention Integrity maintained 11/28/24 0400   Number of days: 1       Physical examination:  Gen: NAD, AAOx3, answering questions appropriately  HEENT: NC  CV: RR  Resp: NWOB  Abd: S/NT/ND  Msk: moving all extremities spontaneously and purposefully. LLE dressings c/d/I. LLE slightly more swollen than RLE.   Neuro: CN II-XII grossly intact, GCS 15    Labs:  Renal:  Recent Labs     12/01/24  0857 12/02/24  0403 12/03/24  0354 12/04/24  0405   BUN 32.8* 33.7* 31.7* 27.1*   CREATININE 0.99 0.98 1.12* 0.94     No results for input(s): "LACTIC" in the last 72 " "hours.  FEN/GI:  Recent Labs     12/01/24  0857 12/02/24  0403 12/03/24  0354 12/04/24  0405   * 134* 132* 129*   K 4.7 4.6 4.7 5.0    102 100 96*   CO2 25 26 25 25   CALCIUM 8.8 9.1 8.7 8.9   ALBUMIN 2.7* 2.5* 2.4* 2.6*   BILITOT 0.7 0.8 0.8 1.5   AST 21 23 20 21   ALKPHOS 49 49 56 61   ALT 6 12 10 10     Heme:  Recent Labs     12/01/24  0857 12/02/24  0403 12/03/24  0017 12/03/24  0354 12/04/24  0405   HGB 8.0* 7.4*  --  6.9* 10.8*   HCT 24.8* 22.9*  --  21.9* 33.1*    168  --  180 230   INR  --   --  1.1  --   --      ID:  Recent Labs     12/02/24  0403 12/03/24  0354 12/04/24  0405   WBC 6.70 7.50 9.92  9.92     CBG:  Recent Labs     12/01/24  0857 12/02/24  0403 12/03/24  0354 12/04/24  0405   GLUCOSE 165* 160* 188* 160*      Recent Labs     12/02/24  1115 12/02/24  1722 12/02/24  2020 12/03/24  0543 12/03/24  1209 12/03/24  1550 12/03/24  2227 12/04/24  0621   POCTGLUCOSE 277* 283* 269* 208* 219* 135* 145* 152*      Cardiovascular:  No results for input(s): "TROPONINI", "CKTOTAL", "CKMB", "BNP" in the last 168 hours.  I have reviewed all pertinent lab results within the past 24 hours.    Imaging:  SURG FL Surgery Fluoro Usage   Final Result      X-Ray Femur 2 View Left   Final Result      As above.         Electronically signed by: David Lomeli   Date:    11/27/2024   Time:    12:36      CT Knee Without Contrast Left   Final Result      Distal femur fracture is described above         Electronically signed by: Jose Solis MD   Date:    11/26/2024   Time:    21:57      X-Ray Knee 3 View Left   Final Result      Displaced distal femur fracture.         Electronically signed by: Jose Solis MD   Date:    11/26/2024   Time:    21:30      X-Ray Hip 2 or 3 views Left with Pelvis when performed   Final Result      No acute abnormalities are seen         Electronically signed by: Jose Solis MD   Date:    11/26/2024   Time:    21:31      X-Ray Chest AP Portable   Final Result      No acute " disease is seen         Electronically signed by: Jose Solis MD   Date:    11/26/2024   Time:    21:22         I have reviewed all pertinent imaging results/findings within the past 24 hours.    Micro/Path/Other:  Microbiology Results (last 7 days)       ** No results found for the last 168 hours. **           Pathology Results  (Last 7 days)      None             Problems list:  Active Problem List with Overview Notes    Diagnosis Date Noted    Fall 11/30/2024    Acute blood loss anemia 11/30/2024    Type 2 diabetes mellitus, with long-term current use of insulin 11/30/2024    Unspecified fracture of lower end of left femur, initial encounter for closed fracture 11/26/2024      Active Diagnoses:    Diagnosis Date Noted POA    PRINCIPAL PROBLEM:  Unspecified fracture of lower end of left femur, initial encounter for closed fracture [S72.402A] 11/26/2024 Yes    Fall [W19.XXXA] 11/30/2024 Yes    Acute blood loss anemia [D62] 11/30/2024 No    Type 2 diabetes mellitus, with long-term current use of insulin [E11.9, Z79.4] 11/30/2024 Not Applicable      Problems Resolved During this Admission:       Assessment & Plan:   L femur fx s/p IMN    Left femur fracture  - Orthopedics following  - s/p IMN left femur, 11/27  - PT/OT  - ASA 81 mg BID on dishcarge  - WBAT LLE with FROM  - Outpatient follow up with Dr. Teixeira in 2 weeks     DM  - POC glucose AC & HS  - Increased sliding scale insulin to moderate; 11/30  - Lantus 5 units qHS; started 11/29     Acute blood loss anemia  - Transfused 2 PRBC 11/28; 1 U PRBC 11/30,  2 units 12/3  - Daily CBC, Hgb 6.9>10.8  - surgical sites soft and compressible with mild swelling noted to LLE thigh     Hypomagnesemia   - Magnesium replaced 11/27     Fall  - Diabetic diet  - Daily labs  - MMPC  - Frequent IS  - Lovenox for VTE prophylaxis   - Fall precautions  - PT/OT  - CM for placement   - Resumed home medications as appropriate  - Increased long acting Insulin 12/3     Enrique Quinn  MD [facility] General Surgery PGY-1

## 2024-12-04 NOTE — PT/OT/SLP PROGRESS
Occupational Therapy   Treatment    Name: Gabriela Lozano  MRN: [id number]    Recommendations:     Recommended therapy intensity at discharge: Moderate Intensity Therapy   Discharge Equipment Recommendations:  to be determined by next level of care  Barriers to discharge:       Assessment:     Gabriela Lozano is a 79 y.o. female with a medical diagnosis of L femur fx s/p IMN 11/27 as result of fall. Performance deficits affecting function are weakness, impaired endurance, impaired self care skills, impaired functional mobility, gait instability, impaired balance, decreased safety awareness, pain, decreased lower extremity function. Presents with limited activity tolerance and did not t/f to chair today 2/2 LLE pain despite premedication.     Rehab Prognosis:  Good; patient would benefit from acute skilled OT services to address these deficits and reach maximum level of function.       Plan:     Patient to be seen 6 x/week to address the above listed problems via self-care/home management, therapeutic activities, therapeutic exercises  Plan of Care Expires: 12/27/24  Plan of Care Reviewed with: patient, son    Subjective     Pain/Comfort:  Location - Side 1: Left  Location 1: leg  Pain Addressed 1: Reposition, Distraction, Pre-medicate for activity    Objective:     Communicated with: RN prior to session.  Patient found supine with PureWick upon OT entry to room.    General Precautions: Standard, fall    Orthopedic Precautions:LLE weight bearing as tolerated  Braces: N/A  Respiratory Status: Room air     Occupational Performance:     Bed Mobility:    Patient completed Rolling/Turning to Left with  moderate assistance and 2 persons  Patient completed Rolling/Turning to Right with moderate assistance and 2 persons  Patient completed Scooting/Bridging with maximal assistance and 2 persons  Patient completed Supine to Sit with maximal assistance and 2 persons  Patient completed Sit to Supine with maximal  assistance and 2 persons   Pt with difficulty flexing TEETEE knees upon sitting EOB 2/2 pain. Demo's strong posterior push requiring Max A for sitting balance initially however progressed to Min-CGA.    Functional Mobility/Transfers:  Patient completed Sit <> Stand Transfer with maximal assistance and of 2 persons  with  rolling walker     Activities of Daily Living:  Lower Body Dressing: moderate assistance to don/doff socks seated EOB using AE. Education provided on technique with son present to interpret directions. Will continue to practice.     Therapeutic Positioning    OT interventions performed during the course of today's session in an effort to prevent and/or reduce acquired pressure injuries:   Education was provided on benefits of and recommendations for therapeutic positioning    [facility] 6 Click ADL: 14    Patient Education:  Patient and son/s were provided with verbal education education regarding OT role/goals/POC and safety awareness.  Understanding was verbalized, however additional teaching warranted.      Patient left right sidelying with all lines intact, call button in reach, wedge under L side, pressure relief boots, and son present.    GOALS:   Multidisciplinary Problems       Occupational Therapy Goals          Problem: Occupational Therapy    Goal Priority Disciplines Outcome Interventions   Occupational Therapy Goal     OT, PT/OT Progressing    Description: Goals to be met by: 12/27/24     Patient will increase functional independence with ADLs by performing:    LE Dressing with Modified [place].  Grooming while standing with Modified [place].  Toileting from bedside commode with Modified [place] for hygiene and clothing management.   Toilet transfer to bedside commode with Modified [place]. Progress as appropriate.                          Time Tracking:     OT Date of Treatment: 12/04/24  OT Start Time: 1000  OT Stop Time: 1038  OT Total Time (min): 38 min    Billable  Minutes:Self Care/Home Management 38    OT/NOEMI: NOEMI     Number of NOEMI visits since last OT visit: 3    12/4/2024

## 2024-12-04 NOTE — PROGRESS NOTES
"[facility] 8[facility] Med Surg  Orthopedics  Progress Note    Patient Name: Gabriela Lozano  MRN: [id number]  Admission Date: 11/26/2024  Hospital Length of Stay: 8 days  Attending Provider: Umesh Pena MD  Primary Care Provider: Monica Humphreys MD    Subjective:     Principal Problem:Unspecified fracture of lower end of left femur, initial encounter for closed fracture    Principal Orthopedic Problem: 7 Days Post-Op   IMN L distal femur fracture    Interval History: Seen this am. Hgb stable this am, incisions all open to air and leg looks good. Awaiting placement    Review of patient's allergies indicates:  No Known Allergies    Current Facility-Administered Medications   Medication    0.9%  NaCl infusion (for blood administration)    carvediloL tablet 6.25 mg    dextrose 10% bolus 125 mL 125 mL    dextrose 10% bolus 250 mL 250 mL    docusate sodium capsule 100 mg    enoxaparin injection 40 mg    gabapentin capsule 300 mg    glucose chewable tablet 16 g    glucose chewable tablet 24 g    insulin aspart U-100 injection 0-10 Units    insulin glargine U-100 (Lantus) injection 10 Units    isosorbide mononitrate 24 hr tablet 30 mg    levothyroxine tablet 88 mcg    magnesium hydroxide 400 mg/5 ml suspension 2,400 mg    melatonin tablet 6 mg    methocarbamoL tablet 500 mg    oxyCODONE immediate release tablet 5 mg    oxyCODONE immediate release tablet Tab 10 mg    polyethylene glycol packet 17 g    sodium chloride 0.9% flush 10 mL     Objective:     Vital Signs (Most Recent):  Temp: 98.6 °F (37 °C) (12/04/24 1100)  Pulse: 96 (12/04/24 1100)  Resp: 17 (12/04/24 1100)  BP: (!) 149/65 (12/04/24 1100)  SpO2: (!) 94 % (12/04/24 1100) Vital Signs (24h Range):  Temp:  [98 °F (36.7 °C)-99.7 °F (37.6 °C)] 98.6 °F (37 °C)  Pulse:  [78-99] 96  Resp:  [16-20] 17  SpO2:  [93 %-97 %] 94 %  BP: (114-163)/(51-82) 149/65     Weight: 84.4 kg (186 lb)  Height: 4' 11" (149.9 cm)  Body mass index is 37.57 " kg/m².      Intake/Output Summary (Last 24 hours) at 12/4/2024 1132  Last data filed at 12/4/2024 0631  Gross per 24 hour   Intake 960 ml   Output 1050 ml   Net -90 ml       Physical Exam:   General the patient is alert and in no acute distress;  nontoxic-appearing appropriate affect.    Constitutional: Vital signs are examined and stable.  Resp: No signs of labored breathing             LLE: -Skin: incisions well approxiamted, staples in tact. Edema noted some ecchymosis            -MSK: +EHL/FHL, + DF/PF           -Neuro:  Sensation intact to light touch throughout           -CV: Capillary refill is less than 2 seconds. +DP. Compartments soft and compressible      Diagnostic Findings:     Significant Labs: CBC:   Recent Labs   Lab 12/03/24  0354 12/04/24  0405   WBC 7.50 9.92  9.92   HGB 6.9* 10.8*   HCT 21.9* 33.1*    230     All pertinent labs within the past 24 hours have been reviewed.    Significant Imaging: I have reviewed all pertinent imaging results/findings.     Assessment/Plan:     Active Diagnoses:    Diagnosis Date Noted POA    PRINCIPAL PROBLEM:  Unspecified fracture of lower end of left femur, initial encounter for closed fracture [S72.402A] 11/26/2024 Yes    Fall [W19.XXXA] 11/30/2024 Yes    Acute blood loss anemia [D62] 11/30/2024 No    Type 2 diabetes mellitus, with long-term current use of insulin [E11.9, Z79.4] 11/30/2024 Not Applicable      Problems Resolved During this Admission:   11/27 IMN L distal femur fracture    Diet: As tolerated  Pain: multimodal PRN  DVT: Lovenox;  OK for ASA 81mg BID on DC x30D  ABLA: expected post op; transfused - hgb stable this am 10.8  PT/OT: Eval and Treat  Activity: WBAT LLE full ROM  Ok to leave incisions open to air if dry; No creams/ointments   FU with Dr Teixeira in 2 weeks for staple removal     The above findings, diagnostics, and treatment plan were discussed with Dr Teixeira who is in agreement with the plan of care except as stated in additional  documentation.      Shelley Robins, FNP   Orthopedic Trauma Surgery  [facility]

## 2024-12-04 NOTE — PT/OT/SLP PROGRESS
"Physical Therapy Treatment    Patient Name:  Gabriela Lozano   MRN:  [id number]    Recommendations:     Discharge therapy intensity: Moderate Intensity Therapy   Discharge Equipment Recommendations: to be determined by next level of care  Barriers to discharge: Impaired mobility    Assessment:     Gabriela Lozano is a 79 y.o. female admitted with a medical diagnosis of L femur fx s/p IMR. .  She presents with the following impairments/functional limitations: weakness, gait instability, impaired endurance, impaired balance, impaired functional mobility, pain, decreased lower extremity function, decreased ROM, orthopedic precautions .    Per pt and her son pt having severe pain in LLE and not feeling up to T/Fing to chair today but agreeable for EOB tx. Spoke with NSG afterwards in regards to pt being very limited with PT 2/2 pain despite making sure pt premedicated for tx session. States she will discuss with .       Pt son states pt has an older back injury that causes a "pinch of her sciatic nerve" and her pain will no numb or have increased pain. Son also reports pt left legs used to be her "good leg" and now it is injured as well.     Rehab Prognosis: Good; patient would benefit from acute skilled PT services to address these deficits and reach maximum level of function.    Recent Surgery: Procedure(s) (LRB):  INSERTION, INTRAMEDULLARY MARCOS, FEMUR, DISTAL, RETROGRADE (Left) 7 Days Post-Op    Plan:     During this hospitalization, patient would benefit from acute PT services 6 x/week to address the identified rehab impairments via gait training, therapeutic activities, therapeutic exercises and progress toward the following goals:    Plan of Care Expires:  12/29/24    Subjective     Chief Complaint:   Patient/Family Comments/goals:   Pain/Comfort:  Location - Side 1: Left  Location 1: leg  Pain Addressed 1: Reposition, Distraction, Pre-medicate for activity      Objective:     Communicated with NSG " prior to session.  Patient found HOB elevated with PureWick upon PT entry to room.     General Precautions: Standard, fall  Orthopedic Precautions: LLE weight bearing as tolerated  Braces: N/A  Respiratory Status: Room air  Blood Pressure:   Skin Integrity: Visible skin intact      Functional Mobility:  Bed Mobility:     Rolling Left:  maximal assistance and to adjust chucks   Rolling Right: maximal assistance and to adjust chucks   Scooting: maximal assistance and required increased time    Supine to Sit: maximal assistance and of 2 persons  Sit to Supine: total assistance and of 2 persons  Transfers:     Sit to Stand:  maximal assistance and of 2 persons with rolling walker and 1 trial from EOB   Dyn sitting: pt sat EOB CGA-Kash while raching outside her FARZANA to participate in doffing/donning socks. Pt required max vcs to not lean back to prevent her hips from sliding forward.     Patient left HOB elevated with all lines intact, call button in reach, wedge under L side, and pressure relief boots    GOALS:   Multidisciplinary Problems       Physical Therapy Goals          Problem: Physical Therapy    Goal Priority Disciplines Outcome Interventions   Physical Therapy Goal     PT, PT/OT Progressing    Description: Goals to be met by: 2024     Patient will increase functional independence with mobility by performin. Supine to sit with MInimal Assistance  2. Sit to supine with MInimal Assistance  3. Sit to stand transfer with Contact Guard Assistance  4. Gait  x 100 feet with Minimal Assistance using Rolling Walker.                          Time Tracking:     PT Received On: 24  PT Start Time: 0955     PT Stop Time: 1037  PT Total Time (min): 42 min     Billable Minutes: Therapeutic Activity 42    Treatment Type: Treatment  PT/PTA: PTA     Number of PTA visits since last PT visit: 4     2024

## 2024-12-05 VITALS
BODY MASS INDEX: 37.5 KG/M2 | OXYGEN SATURATION: 94 % | SYSTOLIC BLOOD PRESSURE: 136 MMHG | WEIGHT: 186 LBS | TEMPERATURE: 100 F | HEART RATE: 80 BPM | DIASTOLIC BLOOD PRESSURE: 80 MMHG | HEIGHT: 59 IN | RESPIRATION RATE: 18 BRPM

## 2024-12-05 LAB
ALBUMIN SERPL-MCNC: 2.4 G/DL (ref 3.4–4.8)
ALBUMIN/GLOB SERPL: 0.9 RATIO (ref 1.1–2)
ALP SERPL-CCNC: 61 UNIT/L (ref 40–150)
ALT SERPL-CCNC: 9 UNIT/L (ref 0–55)
ANION GAP SERPL CALC-SCNC: 9 MEQ/L
AST SERPL-CCNC: 17 UNIT/L (ref 5–34)
BASOPHILS # BLD AUTO: 0.02 X10(3)/MCL
BASOPHILS NFR BLD AUTO: 0.2 %
BILIRUB SERPL-MCNC: 1.8 MG/DL
BUN SERPL-MCNC: 39.3 MG/DL (ref 9.8–20.1)
CALCIUM SERPL-MCNC: 9 MG/DL (ref 8.4–10.2)
CHLORIDE SERPL-SCNC: 97 MMOL/L (ref 98–107)
CO2 SERPL-SCNC: 22 MMOL/L (ref 23–31)
CREAT SERPL-MCNC: 1.29 MG/DL (ref 0.55–1.02)
CREAT/UREA NIT SERPL: 30
CRP SERPL-MCNC: 305 MG/L
EOSINOPHIL # BLD AUTO: 0.21 X10(3)/MCL (ref 0–0.9)
EOSINOPHIL NFR BLD AUTO: 2.5 %
ERYTHROCYTE [DISTWIDTH] IN BLOOD BY AUTOMATED COUNT: 19.9 % (ref 11.5–17)
GFR SERPLBLD CREATININE-BSD FMLA CKD-EPI: 42 ML/MIN/1.73/M2
GLOBULIN SER-MCNC: 2.7 GM/DL (ref 2.4–3.5)
GLUCOSE SERPL-MCNC: 131 MG/DL (ref 82–115)
HCT VFR BLD AUTO: 30.8 % (ref 37–47)
HGB BLD-MCNC: 9.9 G/DL (ref 12–16)
IMM GRANULOCYTES # BLD AUTO: 0.12 X10(3)/MCL (ref 0–0.04)
IMM GRANULOCYTES NFR BLD AUTO: 1.4 %
LYMPHOCYTES # BLD AUTO: 1.31 X10(3)/MCL (ref 0.6–4.6)
LYMPHOCYTES NFR BLD AUTO: 15.7 %
MCH RBC QN AUTO: 27.7 PG (ref 27–31)
MCHC RBC AUTO-ENTMCNC: 32.1 G/DL (ref 33–36)
MCV RBC AUTO: 86 FL (ref 80–94)
MONOCYTES # BLD AUTO: 1.37 X10(3)/MCL (ref 0.1–1.3)
MONOCYTES NFR BLD AUTO: 16.4 %
NEUTROPHILS # BLD AUTO: 5.3 X10(3)/MCL (ref 2.1–9.2)
NEUTROPHILS NFR BLD AUTO: 63.8 %
NRBC BLD AUTO-RTO: 0 %
PLATELET # BLD AUTO: 280 X10(3)/MCL (ref 130–400)
PMV BLD AUTO: 9.5 FL (ref 7.4–10.4)
POCT GLUCOSE: 119 MG/DL (ref 70–110)
POCT GLUCOSE: 232 MG/DL (ref 70–110)
POTASSIUM SERPL-SCNC: 5 MMOL/L (ref 3.5–5.1)
PREALB SERPL-MCNC: 9.7 MG/DL (ref 14–37)
PROT SERPL-MCNC: 5.1 GM/DL (ref 5.8–7.6)
RBC # BLD AUTO: 3.58 X10(6)/MCL (ref 4.2–5.4)
SODIUM SERPL-SCNC: 128 MMOL/L (ref 136–145)
WBC # BLD AUTO: 8.33 X10(3)/MCL (ref 4.5–11.5)

## 2024-12-05 PROCEDURE — 94799 UNLISTED PULMONARY SVC/PX: CPT

## 2024-12-05 PROCEDURE — 84134 ASSAY OF PREALBUMIN: CPT | Performed by: NURSE PRACTITIONER

## 2024-12-05 PROCEDURE — 36415 COLL VENOUS BLD VENIPUNCTURE: CPT | Performed by: NURSE PRACTITIONER

## 2024-12-05 PROCEDURE — 99900031 HC PATIENT EDUCATION (STAT)

## 2024-12-05 PROCEDURE — 25000003 PHARM REV CODE 250: Performed by: NURSE PRACTITIONER

## 2024-12-05 PROCEDURE — 63600175 PHARM REV CODE 636 W HCPCS: Performed by: NURSE PRACTITIONER

## 2024-12-05 PROCEDURE — 85025 COMPLETE CBC W/AUTO DIFF WBC: CPT | Performed by: NURSE PRACTITIONER

## 2024-12-05 PROCEDURE — 94760 N-INVAS EAR/PLS OXIMETRY 1: CPT

## 2024-12-05 PROCEDURE — 25000003 PHARM REV CODE 250

## 2024-12-05 PROCEDURE — 86140 C-REACTIVE PROTEIN: CPT | Performed by: NURSE PRACTITIONER

## 2024-12-05 PROCEDURE — 99900035 HC TECH TIME PER 15 MIN (STAT)

## 2024-12-05 PROCEDURE — 63600175 PHARM REV CODE 636 W HCPCS

## 2024-12-05 PROCEDURE — 80053 COMPREHEN METABOLIC PANEL: CPT | Performed by: NURSE PRACTITIONER

## 2024-12-05 PROCEDURE — 97535 SELF CARE MNGMENT TRAINING: CPT | Mod: CO

## 2024-12-05 PROCEDURE — 99233 SBSQ HOSP IP/OBS HIGH 50: CPT | Mod: ,,, | Performed by: SURGERY

## 2024-12-05 PROCEDURE — 97530 THERAPEUTIC ACTIVITIES: CPT | Mod: CQ

## 2024-12-05 RX ORDER — OXYCODONE HYDROCHLORIDE 10 MG/1
10 TABLET ORAL EVERY 6 HOURS PRN
Qty: 28 TABLET | Refills: 0 | Status: SHIPPED | OUTPATIENT
Start: 2024-12-05

## 2024-12-05 RX ORDER — POLYETHYLENE GLYCOL 3350 17 G/17G
17 POWDER, FOR SOLUTION ORAL DAILY
Qty: 30 EACH | Refills: 0 | Status: SHIPPED | OUTPATIENT
Start: 2024-12-05 | End: 2025-01-04

## 2024-12-05 RX ORDER — INSULIN ASPART 100 [IU]/ML
0-10 INJECTION, SOLUTION INTRAVENOUS; SUBCUTANEOUS
Start: 2024-12-05 | End: 2024-12-05 | Stop reason: HOSPADM

## 2024-12-05 RX ORDER — LEVOTHYROXINE SODIUM 88 UG/1
88 TABLET ORAL
Start: 2024-12-06 | End: 2024-12-05

## 2024-12-05 RX ORDER — POLYETHYLENE GLYCOL 3350 17 G/17G
17 POWDER, FOR SOLUTION ORAL 2 TIMES DAILY
Start: 2024-12-05 | End: 2024-12-05

## 2024-12-05 RX ORDER — LEVOTHYROXINE SODIUM 88 UG/1
88 TABLET ORAL
Qty: 30 TABLET | Refills: 0 | Status: SHIPPED | OUTPATIENT
Start: 2024-12-06 | End: 2025-01-05

## 2024-12-05 RX ORDER — METHOCARBAMOL 500 MG/1
500 TABLET, FILM COATED ORAL EVERY 8 HOURS
Start: 2024-12-05 | End: 2024-12-05

## 2024-12-05 RX ORDER — INSULIN GLARGINE 100 [IU]/ML
10 INJECTION, SOLUTION SUBCUTANEOUS NIGHTLY
Qty: 3 ML | Refills: 0 | Status: SHIPPED | OUTPATIENT
Start: 2024-12-05 | End: 2025-01-04

## 2024-12-05 RX ORDER — OXYCODONE HYDROCHLORIDE 15 MG/1
15 TABLET ORAL EVERY 4 HOURS PRN
Start: 2024-12-05 | End: 2024-12-05

## 2024-12-05 RX ORDER — INSULIN GLARGINE 100 [IU]/ML
10 INJECTION, SOLUTION SUBCUTANEOUS NIGHTLY
Start: 2024-12-05 | End: 2024-12-05

## 2024-12-05 RX ORDER — METHOCARBAMOL 500 MG/1
500 TABLET, FILM COATED ORAL EVERY 8 HOURS
Qty: 30 TABLET | Refills: 0 | Status: SHIPPED | OUTPATIENT
Start: 2024-12-05 | End: 2024-12-15

## 2024-12-05 RX ORDER — ENOXAPARIN SODIUM 100 MG/ML
40 INJECTION SUBCUTANEOUS EVERY 12 HOURS
Start: 2024-12-05 | End: 2024-12-05 | Stop reason: HOSPADM

## 2024-12-05 RX ADMIN — INSULIN ASPART 4 UNITS: 100 INJECTION, SOLUTION INTRAVENOUS; SUBCUTANEOUS at 11:12

## 2024-12-05 RX ADMIN — POLYETHYLENE GLYCOL 3350 17 G: 17 POWDER, FOR SOLUTION ORAL at 08:12

## 2024-12-05 RX ADMIN — CARVEDILOL 6.25 MG: 3.12 TABLET, FILM COATED ORAL at 08:12

## 2024-12-05 RX ADMIN — GABAPENTIN 300 MG: 300 CAPSULE ORAL at 02:12

## 2024-12-05 RX ADMIN — OXYCODONE HYDROCHLORIDE 5 MG: 5 TABLET ORAL at 05:12

## 2024-12-05 RX ADMIN — GABAPENTIN 300 MG: 300 CAPSULE ORAL at 08:12

## 2024-12-05 RX ADMIN — LEVOTHYROXINE SODIUM 88 MCG: 88 TABLET ORAL at 05:12

## 2024-12-05 RX ADMIN — METHOCARBAMOL 500 MG: 500 TABLET ORAL at 05:12

## 2024-12-05 RX ADMIN — ISOSORBIDE MONONITRATE 30 MG: 30 TABLET, EXTENDED RELEASE ORAL at 08:12

## 2024-12-05 RX ADMIN — METHOCARBAMOL 500 MG: 500 TABLET ORAL at 12:12

## 2024-12-05 RX ADMIN — DOCUSATE SODIUM 100 MG: 100 CAPSULE, LIQUID FILLED ORAL at 08:12

## 2024-12-05 RX ADMIN — OXYCODONE HYDROCHLORIDE 5 MG: 5 TABLET ORAL at 12:12

## 2024-12-05 RX ADMIN — ENOXAPARIN SODIUM 40 MG: 40 INJECTION SUBCUTANEOUS at 08:12

## 2024-12-05 RX ADMIN — OXYCODONE HYDROCHLORIDE 5 MG: 5 TABLET ORAL at 04:12

## 2024-12-05 NOTE — PT/OT/SLP PROGRESS
"Physical Therapy Treatment    Patient Name:  Gabriela Lozano   MRN:  [id number]    Recommendations:     Discharge therapy intensity: Moderate Intensity Therapy   Discharge Equipment Recommendations: to be determined by next level of care  Barriers to discharge: Impaired mobility    Assessment:     Gabriela Lozano is a 79 y.o. female admitted with a medical diagnosis of L femur fx s/p IMR. .  She presents with the following impairments/functional limitations: weakness, gait instability, impaired endurance, impaired balance, impaired functional mobility, pain, decreased lower extremity function, decreased ROM, orthopedic precautions .    Attempted to practice slide board T/F bed>w/c. Unable to get setup to be safe enough for T/F 2/2 pt being on the shorter side and unable to lift bed enough for slide board to lay properly. Transfer training terminated to ensure pt safety. Pt requesting to lay back down 2/2 pain in LLE.  Pt with improvement in scooting herself to EOB today.       Pt son states pt has an older back injury that causes a "pinch of her sciatic nerve" and her pain will no numb or have increased pain. Son also reports pt left legs used to be her "good leg" and now it is injured as well.     Rehab Prognosis: Good; patient would benefit from acute skilled PT services to address these deficits and reach maximum level of function.    Recent Surgery: Procedure(s) (LRB):  INSERTION, INTRAMEDULLARY MARCOS, FEMUR, DISTAL, RETROGRADE (Left) 8 Days Post-Op    Plan:     During this hospitalization, patient would benefit from acute PT services 6 x/week to address the identified rehab impairments via gait training, therapeutic activities, therapeutic exercises and progress toward the following goals:    Plan of Care Expires:  12/29/24    Subjective     Chief Complaint:   Patient/Family Comments/goals:   Pain/Comfort:  Location - Side 1: Left  Location 1: leg  Pain Addressed 1: Reposition, Distraction, " Pre-medicate for activity      Objective:     Communicated with NSG prior to session.  Patient found HOB elevated with PureWick upon PT entry to room.     General Precautions: Standard, fall  Orthopedic Precautions: LLE weight bearing as tolerated  Braces: N/A  Respiratory Status: Room air  Blood Pressure:   Skin Integrity: Visible skin intact      Functional Mobility:  Bed Mobility:     Rolling Left:  maximal assistance and to don brief and adjust chucks   Rolling Right: maximal assistance and to don brief and adjust chucks   Scooting: minimum assistance, moderate assistance, and pt with improvement in scooting EOB today. Roughly 4-5 trials.   Supine to Sit: maximal assistance and of 2 persons  Sit to Supine: total assistance and of 2 persons  Dyn sitting: pt sat EOB CGA-modA . Posterior lean. Pt required max vcs to not lean back to prevent her hips from sliding forward.   T/F training: pt leaned to her L side 4-5 trials to attempt to place slide board properly. Unable to get slide board and w/c set up in a safe enough manner to transfer pt. Pt requesting to lay back in bed at this time.     Patient left HOB elevated with all lines intact, call button in reach, and pressure relief boots    GOALS:   Multidisciplinary Problems       Physical Therapy Goals          Problem: Physical Therapy    Goal Priority Disciplines Outcome Interventions   Physical Therapy Goal     PT, PT/OT Progressing    Description: Goals to be met by: 2024     Patient will increase functional independence with mobility by performin. Supine to sit with MInimal Assistance  2. Sit to supine with MInimal Assistance  3. Sit to stand transfer with Contact Guard Assistance  4. Gait  x 100 feet with Minimal Assistance using Rolling Walker.                          Time Tracking:     PT Received On: 24  PT Start Time: 946     PT Stop Time:   PT Total Time (min): 44 min     Billable Minutes: Therapeutic Activity 44    Treatment  Type: Treatment  PT/PTA: PTA     Number of PTA visits since last PT visit: 5     12/05/2024

## 2024-12-05 NOTE — DISCHARGE SUMMARY
[facility] - 8th Floor Med Surg  General Surgery  Discharge Summary      Patient Name: Gabriela Lozano  MRN: [id number]  Admission Date: 11/26/2024  Hospital Length of Stay: 9 days  Discharge Date and Time:  12/05/2024 11:58 AM  Attending Physician: Umesh Pena MD   Discharging Provider: JOSE R Pacheco  Primary Care Provider: Petr, Monica WEISS MD    HPI:   Trip and fall while getting OOB. Legs got caught in sheets. PMH: HTN, DM. No blood thinners. Son translates. Pain isoalted to L leg. Did not hit head or have LOC, GCS 15. Pain well controlled.     Procedure(s) (LRB):  INSERTION, INTRAMEDULLARY MARCOS, FEMUR, DISTAL, RETROGRADE (Left)      Indwelling Lines/Drains at time of discharge:   Lines/Drains/Airways       Drain  Duration             Female External Urinary Catheter w/ Suction 12/03/24 2000 1 day                  Hospital Course: Patient was admitted after a fall with diagnosis of left femur fracture. Orthopedics was consulted and patient was taken for femoral nail placement and stabilization of left distal femur fracture. Patient was found to be anemic following surgery and was transfused. Patient has worked with occupational and physical therapy. Tolerating regular diet and voiding spontaneously. Patient pain controlled with multi-modal pain plan. Patient being discharged to SNF for further therapy to follow up with Orthopedics on 12/12 for wound check and staple removal.      Goals of Care Treatment Preferences:  Code Status: Full Code      Consults:   Consults (From admission, onward)          Status Ordering Provider     Inpatient consult to Midline team  Once        Provider:  (Not yet assigned)    Acknowledged JP THOMAS JR     Inpatient consult to Social Work/Case Management  Once        Provider:  (Not yet assigned)    Completed MARISELA FOURNIER     Inpatient consult to Orthopedic Surgery  Once        Provider:  Noel Lorenz MD    Completed BRITANY MALONEY  C.            Significant Diagnostic Studies: N/A    Pending Diagnostic Studies:       None          Final Active Diagnoses:    Diagnosis Date Noted POA    PRINCIPAL PROBLEM:  Unspecified fracture of lower end of left femur, initial encounter for closed fracture [S72.402A] 11/26/2024 Yes    Fall [W19.XXXA] 11/30/2024 Yes    Acute blood loss anemia [D62] 11/30/2024 No    Type 2 diabetes mellitus, with long-term current use of insulin [E11.9, Z79.4] 11/30/2024 Not Applicable      Problems Resolved During this Admission:      Discharged Condition: good    Disposition: Home or Self Care    Follow Up:   Follow-up Information       Godwin Teixeira MD. Go on 12/12/2024.    Specialty: Orthopedic Surgery  Why: For wound check and suture/staple removal post op @ 8:45 am.  Contact information:  [address] [place] [address] [phone]               [facility] Follow up.    Specialty: Trauma Surgery  Why: As needed  Contact information:  [address] [place] [address] [address] [place] [address] [phone]                         Patient Instructions:      Weight bearing restrictions (specify):   Order Comments: Weightbearing as tolerated on both legs     Medications:  Reconciled Home Medications:      Medication List        START taking these medications      enoxaparin 40 mg/0.4 mL Syrg  Commonly known as: LOVENOX  Inject 0.4 mLs (40 mg total) into the skin every 12 (twelve) hours.     insulin aspart U-100 100 unit/mL injection  Commonly known as: NovoLOG  Inject 0-10 Units into the skin before meals and at bedtime as needed for High Blood Sugar.     levothyroxine 88 MCG tablet  Commonly known as: SYNTHROID  Take 1 tablet (88 mcg total) by mouth before breakfast.  Start taking on: December 6, 2024     methocarbamoL 500 MG Tab  Commonly known as: ROBAXIN  Take 1 tablet (500 mg total) by mouth every 8 (eight) hours. for 10 days     oxyCODONE 15 MG Tab  Commonly known as:  ROXICODONE  Take 1 tablet (15 mg total) by mouth every 4 (four) hours as needed.     polyethylene glycol 17 gram Pwpk  Commonly known as: GLYCOLAX  Take 17 g by mouth 2 (two) times a day.            CHANGE how you take these medications      insulin glargine U-100 (Lantus) 100 unit/mL injection  Inject 10 Units into the skin every evening.  What changed:   how much to take  when to take this            CONTINUE taking these medications      atorvastatin 40 MG tablet  Commonly known as: LIPITOR  Take 40 mg by mouth once daily.     clopidogreL 75 mg tablet  Commonly known as: PLAVIX  Take 75 mg by mouth once daily.     gabapentin 600 MG tablet  Commonly known as: NEURONTIN  Take 600 mg by mouth 2 (two) times daily.     isosorbide mononitrate 30 MG 24 hr tablet  Commonly known as: IMDUR  Take 30 mg by mouth once daily.     lisinopriL 20 MG tablet  Commonly known as: PRINIVIL,ZESTRIL  Take 20 mg by mouth once daily.     metFORMIN 1000 MG tablet  Commonly known as: GLUCOPHAGE  Take 1,000 mg by mouth 2 (two) times daily.            STOP taking these medications      traMADoL 50 mg tablet  Commonly known as: ULTRAM            ASK your doctor about these medications      carvediloL 3.125 MG tablet  Commonly known as: COREG  Take 6.25 mg by mouth 2 (two) times daily.            Time spent on the discharge of patient: 20 minutes    JOSE R Pacheco  General Surgery  [facility] - 8th Floor Med Surg

## 2024-12-05 NOTE — PT/OT/SLP PROGRESS
Occupational Therapy   Treatment    Name: Gabriela Lozano  MRN: [id number]  Admitting Diagnosis:  Unspecified fracture of lower end of left femur, initial encounter for closed fracture  8 Days Post-Op    Recommendations:     Recommended therapy intensity at discharge: Moderate Intensity Therapy   Discharge Equipment Recommendations:  to be determined by next level of care  Barriers to discharge:       Assessment:     Gabriela Lozano is a 79 y.o. female with a medical diagnosis of Unspecified fracture of lower end of left femur, initial encounter for closed fracture.  She presents with increased L LE pain, unable to safely t/f to w/c via slide board at this time. Pt. Is very motivated, with pain being limiting factor at this time recommending Mod intensity therapy pending progress. Performance deficits affecting function are weakness, impaired endurance, impaired self care skills, impaired functional mobility, impaired balance.     Rehab Prognosis:  Good; patient would benefit from acute skilled OT services to address these deficits and reach maximum level of function.       Plan:     Patient to be seen 6 x/week to address the above listed problems via self-care/home management, therapeutic activities, therapeutic exercises  Plan of Care Expires: 12/27/24  Plan of Care Reviewed with: patient    Subjective     Pain/Comfort:  L LE  Objective:     Communicated with: RN prior to session.  Patient found HOB elevated with   upon OT entry to room.    General Precautions: Standard, fall    Orthopedic Precautions:LLE weight bearing as tolerated  Braces: N/A  Respiratory Status: Room air       Occupational Performance:   (Rolling-Mod A)   (Toileting activity-Total A)  (Sitting balance- SBA)  Attempting slide board t/f however not safe at this time.   Pt. Performing LB dressing task while doffing sock with reacher Mod I in a supported long sit position.   Pt. Repositioned in bed appropriately with all needs within  reach.    Therapeutic Positioning    OT interventions performed during the course of today's session in an effort to prevent and/or reduce acquired pressure injuries:   Therapeutic positioning was provided at the conclusion of session to offload all bony prominences for the prevention and/or reduction of pressure injuries        [facility] 6 Click ADL:      Patient Education:  Patient provided with verbal education education regarding fall prevention, safety awareness, and pressure ulcer prevention.  Additional teaching is warranted.      Patient left HOB elevated with all lines intact and call button in reach.    GOALS:   Multidisciplinary Problems       Occupational Therapy Goals          Problem: Occupational Therapy    Goal Priority Disciplines Outcome Interventions   Occupational Therapy Goal     OT, PT/OT Progressing    Description: Goals to be met by: 12/27/24     Patient will increase functional independence with ADLs by performing:    LE Dressing with Modified [place].  Grooming while standing with Modified [place].  Toileting from bedside commode with Modified [place] for hygiene and clothing management.   Toilet transfer to bedside commode with Modified [place]. Progress as appropriate.                          Time Tracking:     OT Date of Treatment: 12/05/24  OT Start Time: 0948  OT Stop Time: 1033  OT Total Time (min): 45 min    Billable Minutes:Self Care/Home Management 3    OT/NOEMI: NOEMI     Number of NOEMI visits since last OT visit: 4    12/5/2024

## 2024-12-05 NOTE — PROGRESS NOTES
Trauma Surgery   Progress Note  Patient Name: Gabriela Lozano                   : 1945     MRN: [id number]   Date of Admission: 2024  Code Status: Full Code  Date of Exam: 2024  HD#9  POD#8 Days Post-Op  Attending Provider: Umesh Pena MD    Subjective:   Interval history:  NAEON  AFVSS  Patient c/o left femur pain, states it is controlled with meds  Left thigh with swelling, soft, compressible  H&H 9.9/30.8   Tolerating diabetic diet, glucose 131 today  Creat up today at 1.29 from .94, BUN 39.3 from 27.1  Working with PT/OT    Home Meds:   Current Outpatient Medications   Medication Instructions    atorvastatin (LIPITOR) 40 mg, Oral, Daily    carvediloL (COREG) 6.25 mg, 2 times daily    clopidogreL (PLAVIX) 75 mg, Oral, Daily    gabapentin (NEURONTIN) 600 mg, 2 times daily    insulin glargine U-100 (Lantus) 30 Units, Subcutaneous, Daily    isosorbide mononitrate (IMDUR) 30 mg, Daily    lisinopriL (PRINIVIL,ZESTRIL) 20 mg, Oral, Daily    metFORMIN (GLUCOPHAGE) 1,000 mg, Oral, 2 times daily    traMADoL (ULTRAM) 50 mg, Oral, Daily PRN      Scheduled Meds:   carvediloL  6.25 mg Oral BID    docusate sodium  100 mg Oral BID    enoxparin  40 mg Subcutaneous Q12H    gabapentin  300 mg Oral TID    insulin glargine U-100  10 Units Subcutaneous QHS    isosorbide mononitrate  30 mg Oral Daily    levothyroxine  88 mcg Oral Before breakfast    methocarbamoL  500 mg Oral Q8H    polyethylene glycol  17 g Oral BID     Continuous Infusions:  PRN Meds:  Current Facility-Administered Medications:     0.9%  NaCl infusion (for blood administration), , Intravenous, Q24H PRN    dextrose 10%, 12.5 g, Intravenous, PRN    dextrose 10%, 25 g, Intravenous, PRN    glucose, 16 g, Oral, PRN    glucose, 24 g, Oral, PRN    insulin aspart U-100, 0-10 Units, Subcutaneous, QID (AC + HS) PRN    magnesium hydroxide 400 mg/5 ml, 30 mL, Oral, Daily PRN    melatonin, 6 mg, Oral, Nightly PRN    oxyCODONE, 15 mg, Oral, Q4H  "PRN    oxyCODONE, 5 mg, Oral, Q4H PRN    Flushing PICC/Midline Protocol, , , Until Discontinued **AND** sodium chloride 0.9%, 10 mL, Intravenous, Q12H PRN     Objective:     VITAL SIGNS: 24 HR MIN & MAX LAST   Temp  Min: 98.4 °F (36.9 °C)  Max: 99.3 °F (37.4 °C)  98.4 °F (36.9 °C)   BP  Min: 100/61  Max: 149/65  119/71    Pulse  Min: 72  Max: 99  78    Resp  Min: 16  Max: 20  18    SpO2  Min: 89 %  Max: 96 %  (!) 94 %      HT: 4' 11" (149.9 cm)  WT: 84.4 kg (186 lb)  BMI: 37.5     Intake/output:  Intake/Output - Last 3 Shifts         12/03 0700  12/04 0659 12/04 0700  12/05 0659 12/05 0700  12/06 0659    P.O. 960 810     Total Intake(mL/kg) 960 (11.4) 810 (9.6)     Urine (mL/kg/hr) 1400 (0.7) 350 (0.2)     Stool 0 0     Total Output 1400 350     Net -440 +460            Urine Occurrence  2 x     Stool Occurrence 3 x 2 x             Intake/Output Summary (Last 24 hours) at 12/5/2024 0715  Last data filed at 12/5/2024 0557  Gross per 24 hour   Intake 810 ml   Output 350 ml   Net 460 ml         Lines/drains/airway:       Peripheral IV - Single Lumen 11/26/24 2130 20 G Anterior;Distal;Left Forearm (Active)   Site Assessment Clean;Dry;Intact;No redness;No swelling 11/28/24 0400   Line Securement Device Secured with sutureless device 11/27/24 2000   Extremity Assessment Distal to IV No abnormal discoloration;No redness;No swelling;No warmth 11/27/24 2000   Line Status Capped 11/28/24 0400   Dressing Status Clean;Dry;Intact 11/28/24 0400   Dressing Intervention Integrity maintained 11/28/24 0400   Number of days: 1       Physical examination:  Gen: NAD, AAOx3, answering questions appropriately  HEENT: NC  CV: RR  Resp: NWOB  Abd: S/NT/ND  Msk: moving all extremities spontaneously and purposefully. LLE dressings c/d/I. LLE more swollen than RLE. LLE compressible and soft  Neuro: CN II-XII grossly intact, GCS 15    Labs:  Renal:  Recent Labs     12/03/24  0354 12/04/24  0405   BUN 31.7* 27.1*   CREATININE 1.12* 0.94     No " "results for input(s): "LACTIC" in the last 72 hours.  FEN/GI:  Recent Labs     12/03/24  0354 12/04/24  0405   * 129*   K 4.7 5.0    96*   CO2 25 25   CALCIUM 8.7 8.9   ALBUMIN 2.4* 2.6*   BILITOT 0.8 1.5   AST 20 21   ALKPHOS 56 61   ALT 10 10     Heme:  Recent Labs     12/03/24  0017 12/03/24  0354 12/04/24  0405 12/05/24  0623   HGB  --  6.9* 10.8* 9.9*   HCT  --  21.9* 33.1* 30.8*   PLT  --  180 230 280   INR 1.1  --   --   --      ID:  Recent Labs     12/03/24  0354 12/04/24  0405 12/05/24  0623   WBC 7.50 9.92  9.92 8.33     CBG:  Recent Labs     12/03/24  0354 12/04/24  0405   GLUCOSE 188* 160*      Recent Labs     12/03/24  1209 12/03/24  1550 12/03/24  2227 12/04/24  0621 12/04/24  1053 12/04/24  1643 12/04/24  2036 12/05/24  0501   POCTGLUCOSE 219* 135* 145* 152* 219* 208* 148* 119*      Cardiovascular:  No results for input(s): "TROPONINI", "CKTOTAL", "CKMB", "BNP" in the last 168 hours.  I have reviewed all pertinent lab results within the past 24 hours.    Imaging:  SURG FL Surgery Fluoro Usage   Final Result      X-Ray Femur 2 View Left   Final Result      As above.         Electronically signed by: David Lomeli   Date:    11/27/2024   Time:    12:36      CT Knee Without Contrast Left   Final Result      Distal femur fracture is described above         Electronically signed by: Jose Solis MD   Date:    11/26/2024   Time:    21:57      X-Ray Knee 3 View Left   Final Result      Displaced distal femur fracture.         Electronically signed by: Jose Solis MD   Date:    11/26/2024   Time:    21:30      X-Ray Hip 2 or 3 views Left with Pelvis when performed   Final Result      No acute abnormalities are seen         Electronically signed by: Jose Solis MD   Date:    11/26/2024   Time:    21:31      X-Ray Chest AP Portable   Final Result      No acute disease is seen         Electronically signed by: Jose Solis MD   Date:    11/26/2024   Time:    21:22         I have reviewed all " pertinent imaging results/findings within the past 24 hours.    Micro/Path/Other:  Microbiology Results (last 7 days)       ** No results found for the last 168 hours. **           Pathology Results  (Last 7 days)      None             Problems list:  Active Problem List with Overview Notes    Diagnosis Date Noted    Fall 11/30/2024    Acute blood loss anemia 11/30/2024    Type 2 diabetes mellitus, with long-term current use of insulin 11/30/2024    Unspecified fracture of lower end of left femur, initial encounter for closed fracture 11/26/2024      Active Diagnoses:    Diagnosis Date Noted POA    PRINCIPAL PROBLEM:  Unspecified fracture of lower end of left femur, initial encounter for closed fracture [S72.402A] 11/26/2024 Yes    Fall [W19.XXXA] 11/30/2024 Yes    Acute blood loss anemia [D62] 11/30/2024 No    Type 2 diabetes mellitus, with long-term current use of insulin [E11.9, Z79.4] 11/30/2024 Not Applicable      Problems Resolved During this Admission:       Assessment & Plan:   L femur fx s/p IMN    Left femur fracture  - Orthopedics following  - s/p IMN left femur, 11/27  - PT/OT  - ASA 81 mg BID on dishcarge  - WBAT LLE with FROM  - Outpatient follow up with Dr. Teixeira in 2 weeks     DM  - POC glucose AC & HS  - Increased sliding scale insulin to moderate; 11/30  - Lantus 10 units qHS; started 11/29     Acute blood loss anemia  - Transfused 2 PRBC 11/28; 1 U PRBC 11/30,  2 units 12/3  - Daily CBC  - surgical sites soft and compressible with mild swelling noted to LLE thigh     Hypomagnesemia   - Magnesium replaced 11/27     Fall  - Diabetic diet  - Daily labs  - MMPC  - Frequent IS  - Lovenox for VTE prophylaxis   - Fall precautions  - PT/OT  - CM for placement   - Resumed home medications as appropriate  - Increased long acting Insulin 12/3     Ankita Call [facility]  Trauma Surgery

## 2024-12-05 NOTE — PLAN OF CARE
"[facility] sent discharge orders/AVS and clinicals to [facility] via [facility]. Raegan with [facility] was notified. Patient is placed in "Will-Call" with [facility]. Report information was received and given to nurse.  "

## 2024-12-05 NOTE — PLAN OF CARE
12/05/24 0948   Final Note   Assessment Type Discharge Planning Assessment   Anticipated Discharge Disposition SNF   Hospital Resources/Appts/Education Provided Appointments scheduled and added to AVS   Post-Acute Status   Post-Acute Authorization Placement   Post-Acute Placement Status Set-up Complete/Auth obtained   Discharge Delays None known at this time     Pt being dc to [facility] today.

## 2024-12-05 NOTE — CARE UPDATE
[id number] Took pt out [facility]

## 2024-12-05 NOTE — PLAN OF CARE
DANA received a call from Landy Brand at [facility], auth appvl was received. Patient can be admitted if medically stable.

## 2024-12-05 NOTE — PROGRESS NOTES
Inpatient Nutrition Evaluation    Admit Date: 11/26/2024   Total duration of encounter: 9 days   Patient Age: 79 y.o.    Nutrition Recommendation/Prescription     -Continue 2000 Calorie Diabetic Diet as tolerated. Encourage po intake of meals.   -Trial Boost Glucose Control TID for additional nourishment; provides 250 kcal and 14 gm protein per container.   -Monitor wt, labs, and intake.     Nutrition Assessment     Chart Review    Reason Seen: continuous nutrition monitoring and length of stay    Malnutrition Screening Tool Results   Have you recently lost weight without trying?: No  Have you been eating poorly because of a decreased appetite?: No   MST Score: 0   Diagnosis:  L femur fx s/p IMN     Relevant Medical History: DM, HTN    Scheduled Medications:  carvediloL, 6.25 mg, BID  docusate sodium, 100 mg, BID  enoxparin, 40 mg, Q12H  gabapentin, 300 mg, TID  insulin glargine U-100, 10 Units, QHS  isosorbide mononitrate, 30 mg, Daily  levothyroxine, 88 mcg, Before breakfast  methocarbamoL, 500 mg, Q8H  polyethylene glycol, 17 g, BID    Continuous Infusions:   PRN Medications:   Current Facility-Administered Medications:     0.9%  NaCl infusion (for blood administration), , Intravenous, Q24H PRN    dextrose 10%, 12.5 g, Intravenous, PRN    dextrose 10%, 25 g, Intravenous, PRN    glucose, 16 g, Oral, PRN    glucose, 24 g, Oral, PRN    insulin aspart U-100, 0-10 Units, Subcutaneous, QID (AC + HS) PRN    magnesium hydroxide 400 mg/5 ml, 30 mL, Oral, Daily PRN    melatonin, 6 mg, Oral, Nightly PRN    oxyCODONE, 15 mg, Oral, Q4H PRN    oxyCODONE, 5 mg, Oral, Q4H PRN    Flushing PICC/Midline Protocol, , , Until Discontinued **AND** sodium chloride 0.9%, 10 mL, Intravenous, Q12H PRN    Recent Labs   Lab 11/29/24  0407 11/30/24  0411 11/30/24  0412 11/30/24  1954 12/01/24  0857 12/02/24  0402 12/02/24  0403 12/03/24  0354 12/04/24  0405 12/05/24  0623   * 133*  --   --  134*  --  134* 132* 129* 128*   K 5.2* 5.0  --  "  --  4.7  --  4.6 4.7 5.0 5.0   CALCIUM 9.2 8.3*  --   --  8.8  --  9.1 8.7 8.9 9.0   PHOS 3.2 3.1  --   --   --   --   --   --   --   --    MG 1.70 1.40*  --   --   --   --   --   --   --   --     101  --   --  102  --  102 100 96* 97*   CO2 19* 23  --   --  25  --  26 25 25 22*   BUN 39.0* 37.6*  --   --  32.8*  --  33.7* 31.7* 27.1* 39.3*   CREATININE 1.33* 1.21*  --   --  0.99  --  0.98 1.12* 0.94 1.29*   EGFRNORACEVR 41 45  --   --  58  --  58 50 >60 42   GLUCOSE 224* 235*  --   --  165*  --  160* 188* 160* 131*   BILITOT 0.5 0.5  --   --  0.7  --  0.8 0.8 1.5 1.8*   ALKPHOS 40 41  --   --  49  --  49 56 61 61   ALT <5 <5  --   --  6  --  12 10 10 9   AST 22 16  --   --  21  --  23 20 21 17   ALBUMIN 3.1* 2.6*  --   --  2.7*  --  2.5* 2.4* 2.6* 2.4*   PREALB  --   --   --   --   --  12.3*  --   --   --   --    CRP  --   --   --   --   --   --  115.20*  --   --  305.00*   WBC 10.17  --  7.83 7.50 6.34  --  6.70 7.50 9.92  9.92 8.33   HGB 9.3*  --  7.3* 8.3* 8.0*  --  7.4* 6.9* 10.8* 9.9*   HCT 28.4*  --  22.5* 25.9* 24.8*  --  22.9* 21.9* 33.1* 30.8*     Nutrition Orders:  Diet diabetic 2000 Calories (up to 75 gm per meal)      Appetite/Oral Intake: poor/25-50% of meals  Factors Affecting Nutritional Intake: decreased appetite  Food/[place]/Cultural Preferences: none reported  Food Allergies: no known food allergies  Last Bowel Movement: 12/04/24  Wound(s):  surgical incision     Comments    12/2/24: Pt with fair intake of meals, tolerating diet.     12/5/24: Per family member present in the room, pt has not been very hungry the last couple of days; willing to try vanilla protein drink; denies n/v currently.     Anthropometrics    Height: 4' 11" (149.9 cm), Height Method: Stated  Last Weight: 84.4 kg (186 lb) (11/26/24 2352), Weight Method: Bed Scale  BMI (Calculated): 37.5  BMI Classification: obese grade II (BMI 35-39.9)     Ideal Body Weight (IBW), Female: 95 lb     % Ideal Body Weight, Female (lb): " 195.79 %                             Usual Weight Provided By: EMR weight history    Wt Readings from Last 5 Encounters:   11/26/24 84.4 kg (186 lb)   09/08/23 86.2 kg (190 lb)   07/10/17 85.9 kg (189 lb 6 oz)     Weight Change(s) Since Admission:   Wt Readings from Last 1 Encounters:   11/26/24 2352 84.4 kg (186 lb)   11/26/24 1956 99.8 kg (220 lb)   Admit Weight: 99.8 kg (220 lb) (11/26/24 1956), Weight Method: Bed Scale    220 lb wt most likely inaccurate    Patient Education     Not applicable.    Nutrition Goals & Monitoring     Dietitian will monitor: energy intake and weight    Nutrition Risk/Follow-Up: low (follow-up in 5-7 days)  Patients assigned 'low nutrition risk' status do not qualify for a full nutritional assessment but will be monitored and re-evaluated in a 5-7 day time period. Please consult if re-evaluation needed sooner.

## 2024-12-05 NOTE — CARE UPDATE
[id number] Pt accepted to [facility] today. Trauma team will be putting in dc orders. Informed pt's son and dgtr of pt's dc today to [place]

## 2024-12-12 ENCOUNTER — OFFICE VISIT (OUTPATIENT)
Dept: ORTHOPEDICS | Facility: CLINIC | Age: 79
End: 2024-12-12
Payer: COMMERCIAL

## 2024-12-12 VITALS
BODY MASS INDEX: 37.33 KG/M2 | HEART RATE: 75 BPM | WEIGHT: 185.19 LBS | SYSTOLIC BLOOD PRESSURE: 124 MMHG | HEIGHT: 59 IN | DIASTOLIC BLOOD PRESSURE: 86 MMHG

## 2024-12-12 DIAGNOSIS — S72.402A UNSPECIFIED FRACTURE OF LOWER END OF LEFT FEMUR, INITIAL ENCOUNTER FOR CLOSED FRACTURE: Primary | ICD-10-CM

## 2024-12-12 RX ORDER — INSULIN ASPART 100 [IU]/ML
INJECTION, SOLUTION INTRAVENOUS; SUBCUTANEOUS
COMMUNITY
Start: 2024-12-05

## 2024-12-12 RX ORDER — HUMAN INSULIN 100 [IU]/ML
INJECTION, SOLUTION SUBCUTANEOUS
COMMUNITY
Start: 2024-12-09

## 2024-12-12 NOTE — PROGRESS NOTES
[facility]      Name: Gabriela Lozano  : 1945  MRN: [id number]  Date: 2024    Chief Complaint   Patient presents with    Left Femur - Post-op Evaluation     2 WEEK F/U FROM IMN LEFT FEMUR FX. WB WITH PHYSICAL THERAPY AND WALKER. REPORTS INTERMITTENT PAIN.        Subjective:      Chief Complaint   Patient presents with    Left Femur - Post-op Evaluation     2 WEEK F/U FROM IMN LEFT FEMUR FX. WB WITH PHYSICAL THERAPY AND WALKER. REPORTS INTERMITTENT PAIN.         HPI  Gabriela Lozano is a 79 y.o. female presents to clinic for 2 week follow up status post intramedullary nail of left femur fracture.  Her son is with her today.  Reports she has intermittent pain that is better with medication.  Does not like to take narcotic pain medication to often due to side effects of being fatigued.  Admits to swelling that is better with elevation.  No other complaints at this time.  She is mainly Persian-speaking but her son is able to translate.      ROS:  Constitutional: Denies fever chills  MSK: Pain evident at site of injury located in HPI,   Integ: No signs of abrasions or lacerations  Neuro: No numbness or tingling  Lymphatic: No swelling outside the area of injury     Current Outpatient Medications   Medication Instructions    atorvastatin (LIPITOR) 40 mg, Daily    carvediloL (COREG) 6.25 mg, 2 times daily    clopidogreL (PLAVIX) 75 mg, Daily    gabapentin (NEURONTIN) 600 mg, 2 times daily    insulin aspart U-100 (NOVOLOG) 100 unit/mL (3 mL) InPn pen     insulin glargine U-100 (Lantus) 10 Units, Subcutaneous, Nightly    isosorbide mononitrate (IMDUR) 30 mg, Daily    levothyroxine (SYNTHROID) 88 mcg, Oral, Before breakfast    lisinopriL (PRINIVIL,ZESTRIL) 20 mg, Daily    metFORMIN (GLUCOPHAGE) 1,000 mg, 2 times daily    methocarbamoL (ROBAXIN) 500 mg, Oral, Every 8 hours    NOVOLIN R FLEXPEN 100 unit/mL (3 mL) InPn pen     oxyCODONE (ROXICODONE) 10 mg, Oral, Every 6 hours PRN  "   polyethylene glycol (GLYCOLAX) 17 g, Oral, Daily        Objective:     Visit Vitals  /86   Pulse 75   Ht 4' 11" (1.499 m)   Wt 84 kg (185 lb 3 oz)   BMI 37.40 kg/m²     Physical Exam    General the patient is alert and oriented x3 no acute distress nontoxic-appearing appropriate affect.    Constitutional: Vital signs are examined and stable.  Resp: No signs of labored breathing                   Left lower extremity:           -Skin:  Incision well healed without evidence of dehiscence or infection; no erythema or discharge.  Staples removed today           -MSK: Hip and Knee F/E, EHL/FHL, Gastroc/Tib anterior motor intact.  No painful or prominent hardware.  Tolerates passive range of motion of knee with mild discomfort with full extension.  Seated in wheelchair, knees and approximately 90° knee flexion.           -Neuro:  Sensation intact to light touch L3-S1 dermatomes           -Lymphatic:  Nonpitting edema at surgical site           -CV:  Posterior tibialis pulse palpable.  Compartments soft and compressible        Body mass index is 37.4 kg/m².  Ideal body weight: 48.8 kg (107 lb 8.4 oz)  Adjusted ideal body weight: 62.9 kg (138 lb 9.4 oz)  Hgb   Date Value Ref Range Status   12/06/2024 10.6 (L) 12.0 - 16.0 g/dL Final   12/05/2024 9.9 (L) 12.0 - 16.0 g/dL Final     Hct   Date Value Ref Range Status   12/06/2024 32.9 (L) 37.0 - 47.0 % Final   12/05/2024 30.8 (L) 37.0 - 47.0 % Final     Vitamin D   Date Value Ref Range Status   12/06/2024 33 30 - 80 ng/mL Final     WBC   Date Value Ref Range Status   12/06/2024 7.76 4.50 - 11.50 x10(3)/mcL Final   12/05/2024 8.33 4.50 - 11.50 x10(3)/mcL Final   12/04/2024 9.92 x10(3)/mcL Final       Radiology:   none    Assessment:       ICD-10-CM ICD-9-CM   1. Unspecified fracture of lower end of left femur, initial encounter for closed fracture  S72.402A 821.20       Plan:     1. Unspecified fracture of lower end of left femur, initial encounter for closed " fracture      Patient may weightbear as tolerated and perform range of motion as tolerated to left lower extremity.  We will need evaluation and treatment with physical therapy.  Needs gait training and strengthening/stretching.  Staples removed today.  May shower and use soap over incisions.  Imaging reviewed with the patient.  Return to clinic in 1 month for repeat evaluation and x-rays.    Pt verbalizes understanding and agrees with tx plan. Pt to call clinic with any questions/concerns.      The above findings, diagnostics, and treatment plan were discussed with Dr. Teixeira who is in agreement with the plan of care except as stated in additional documentation.     [facility]    No future appointments.    This note was created with the assistance of voice recognition software or phone dictation. There may be transcription errors as a result of using this technology however minimal. Effort has been made to assure accuracy of transcription but any obvious errors or omissions should be clarified with the author of the document.

## 2025-01-13 ENCOUNTER — OFFICE VISIT (OUTPATIENT)
Dept: ORTHOPEDICS | Facility: CLINIC | Age: 80
End: 2025-01-13
Payer: COMMERCIAL

## 2025-01-13 ENCOUNTER — HOSPITAL ENCOUNTER (OUTPATIENT)
Dept: RADIOLOGY | Facility: CLINIC | Age: 80
Discharge: HOME | End: 2025-01-13
Attending: ORTHOPAEDIC SURGERY
Payer: MEDICARE

## 2025-01-13 VITALS — HEIGHT: 59 IN | BODY MASS INDEX: 37.33 KG/M2 | WEIGHT: 185.19 LBS

## 2025-01-13 DIAGNOSIS — S72.492D OTHER CLOSED FRACTURE OF DISTAL END OF LEFT FEMUR WITH ROUTINE HEALING, SUBSEQUENT ENCOUNTER: Primary | ICD-10-CM

## 2025-01-13 DIAGNOSIS — S72.492D OTHER CLOSED FRACTURE OF DISTAL END OF LEFT FEMUR WITH ROUTINE HEALING, SUBSEQUENT ENCOUNTER: ICD-10-CM

## 2025-01-13 PROCEDURE — 3288F FALL RISK ASSESSMENT DOCD: CPT | Mod: CPTII,,,

## 2025-01-13 PROCEDURE — 99024 POSTOP FOLLOW-UP VISIT: CPT | Mod: ,,,

## 2025-01-13 PROCEDURE — 1159F MED LIST DOCD IN RCRD: CPT | Mod: CPTII,,,

## 2025-01-13 PROCEDURE — 1160F RVW MEDS BY RX/DR IN RCRD: CPT | Mod: CPTII,,,

## 2025-01-13 PROCEDURE — 1100F PTFALLS ASSESS-DOCD GE2>/YR: CPT | Mod: CPTII,,,

## 2025-01-13 PROCEDURE — 73552 X-RAY EXAM OF FEMUR 2/>: CPT | Mod: LT,,, | Performed by: ORTHOPAEDIC SURGERY

## 2025-01-13 RX ORDER — MIRTAZAPINE 15 MG/1
15 TABLET, FILM COATED ORAL NIGHTLY
COMMUNITY
Start: 2024-12-18

## 2025-01-13 RX ORDER — LACTULOSE 10 G/15ML
SOLUTION ORAL; RECTAL
Status: ON HOLD | COMMUNITY
Start: 2024-12-11 | End: 2025-01-18 | Stop reason: HOSPADM

## 2025-01-13 RX ORDER — LOPERAMIDE HYDROCHLORIDE 2 MG/1
CAPSULE ORAL
Status: ON HOLD | COMMUNITY
Start: 2025-01-07 | End: 2025-01-18 | Stop reason: HOSPADM

## 2025-01-13 RX ORDER — ENOXAPARIN SODIUM 100 MG/ML
INJECTION SUBCUTANEOUS
Status: ON HOLD | COMMUNITY
Start: 2024-12-30 | End: 2025-01-18 | Stop reason: HOSPADM

## 2025-01-13 NOTE — PROGRESS NOTES
[facility]      Name: Gabriela Lozano  : 1945  MRN: [id number]  Date: 2025    Chief Complaint   Patient presents with    Left Femur - Follow-up     6.5 week f/u from IMN left femur fx. Ambulates with walker for short distances. Currently working with physical therapy. Reports minimal discomfort.        Subjective:      Chief Complaint   Patient presents with    Left Femur - Follow-up     6.5 week f/u from IMN left femur fx. Ambulates with walker for short distances. Currently working with physical therapy. Reports minimal discomfort.         HPI  Gabriela Lozano is a 79 y.o. female presents to clinic for 6.5 week follow up status post intramedullary nail of left femur fracture.  Her son is with her today.  Her son works she is at a facility where she is participating in physical therapy.  Reports she ambulates with a walker for exercise.  Denies pain at this time.  Reports she feels well.  No other complaints at this time.  She is mainly Maori-speaking but her son is able to translate.      ROS:  Constitutional: Denies fever chills  MSK: Pain evident at site of injury located in HPI,   Integ: No signs of abrasions or lacerations  Neuro: No numbness or tingling  Lymphatic: No swelling outside the area of injury     Current Outpatient Medications   Medication Instructions    atorvastatin (LIPITOR) 40 mg, Daily    carvediloL (COREG) 6.25 mg, 2 times daily    clopidogreL (PLAVIX) 75 mg, Daily    enoxaparin (LOVENOX) 40 mg/0.4 mL Syrg Inject into the skin.    gabapentin (NEURONTIN) 600 mg, 2 times daily    insulin aspart U-100 (NOVOLOG) 100 unit/mL (3 mL) InPn pen     insulin glargine U-100 (Lantus) 10 Units, Subcutaneous, Nightly    isosorbide mononitrate (IMDUR) 30 mg, Daily    lactulose (CHRONULAC) 10 gram/15 mL solution SMARTSIG:Milliliter(s) By Mouth    levothyroxine (SYNTHROID) 88 mcg, Oral, Before breakfast    lisinopriL (PRINIVIL,ZESTRIL) 20 mg, Daily     "loperamide (IMODIUM) 2 mg capsule Take by mouth.    metFORMIN (GLUCOPHAGE) 1,000 mg, 2 times daily    mirtazapine (REMERON) 15 mg, Nightly    NOVOLIN R FLEXPEN 100 unit/mL (3 mL) InPn pen     oxyCODONE (ROXICODONE) 10 mg, Oral, Every 6 hours PRN        Objective:     Visit Vitals  Ht 4' 11" (1.499 m)   Wt 84 kg (185 lb 3 oz)   BMI 37.40 kg/m²     Physical Exam    General the patient is alert and oriented x3 no acute distress nontoxic-appearing appropriate affect.    Constitutional: Vital signs are examined and stable.  Resp: No signs of labored breathing                   Left lower extremity:           -Skin:  Incisions well healed without evidence of dehiscence or infection; no erythema or discharge.             -MSK: Hip and Knee F/E, EHL/FHL, Gastroc/Tib anterior motor intact.  No painful or prominent hardware.  Tolerates passive range of motion of knee without discomfort with full extension.  Seated in wheelchair, knees and approximately 100° knee flexion.  No crepitus to knee with passive range of motion.           -Neuro:  Sensation intact to light touch L3-S1 dermatomes           -Lymphatic:  Nonpitting edema at foot           -CV:  Posterior tibialis pulse palpable.  Compartments soft and compressible        Body mass index is 37.4 kg/m².  Ideal body weight: 48.8 kg (107 lb 8.4 oz)  Adjusted ideal body weight: 62.9 kg (138 lb 9.4 oz)  Hgb   Date Value Ref Range Status   12/18/2024 11.3 (L) 12.0 - 16.0 g/dL Final   12/06/2024 10.6 (L) 12.0 - 16.0 g/dL Final     Hct   Date Value Ref Range Status   12/18/2024 36.4 (L) 37.0 - 47.0 % Final   12/06/2024 32.9 (L) 37.0 - 47.0 % Final     Vitamin D   Date Value Ref Range Status   12/06/2024 33 30 - 80 ng/mL Final     WBC   Date Value Ref Range Status   12/18/2024 7.84 4.50 - 11.50 x10(3)/mcL Final   12/06/2024 7.76 4.50 - 11.50 x10(3)/mcL Final   12/04/2024 9.92 x10(3)/mcL Final       Radiology:   X-ray left femur, two-view: Hardware intact without evidence of " failure.  Fracture maintains alignment.    Assessment:       ICD-10-CM ICD-9-CM   1. Other closed fracture of distal end of left femur with routine healing, subsequent encounter  S72.492D V54.15       Plan:     1. Other closed fracture of distal end of left femur with routine healing, subsequent encounter  -     X-Ray Femur 2 View Left; Future; Expected date: 01/13/2025      Patient doing well today and continuing to work with physical therapy at the facility she is residing at.  She may continue to weightbear as tolerated and perform range of motion as tolerated.  Continue gait training and strengthening/stretching.  Return to clinic in 5-6 weeks for repeat evaluation and x-rays.  Patient and her son verbalized understanding and agree with treatment plan.  Pt to call clinic with any questions/concerns.      The above findings, diagnostics, and treatment plan were discussed with Dr. Teixeira who is in agreement with the plan of care except as stated in additional documentation.     Ashley Gunther, PA-C [facility]    Future Appointments   Date Time Provider Department Center   2/24/2025  8:45 AM Gdowin Teixeira MD [facility] [place]       This note was created with the assistance of voice recognition software or phone dictation. There may be transcription errors as a result of using this technology however minimal. Effort has been made to assure accuracy of transcription but any obvious errors or omissions should be clarified with the author of the document.

## 2025-01-17 ENCOUNTER — HOSPITAL ENCOUNTER (INPATIENT)
Facility: HOSPITAL | Age: 80
LOS: 1 days | Discharge: HOME | DRG: 641 | End: 2025-01-18
Attending: EMERGENCY MEDICINE | Admitting: INTERNAL MEDICINE
Payer: MEDICARE

## 2025-01-17 DIAGNOSIS — R60.9 SWELLING: ICD-10-CM

## 2025-01-17 DIAGNOSIS — M79.89 LEG SWELLING: ICD-10-CM

## 2025-01-17 DIAGNOSIS — E83.42 HYPOMAGNESEMIA: Primary | ICD-10-CM

## 2025-01-17 DIAGNOSIS — N17.9 AKI (ACUTE KIDNEY INJURY): ICD-10-CM

## 2025-01-17 DIAGNOSIS — R79.89 ELEVATED BRAIN NATRIURETIC PEPTIDE (BNP) LEVEL: ICD-10-CM

## 2025-01-17 LAB
ALBUMIN SERPL-MCNC: 3 G/DL (ref 3.4–4.8)
ALBUMIN/GLOB SERPL: 1 RATIO (ref 1.1–2)
ALP SERPL-CCNC: 72 UNIT/L (ref 40–150)
ALT SERPL-CCNC: 8 UNIT/L (ref 0–55)
ANION GAP SERPL CALC-SCNC: 7 MEQ/L
AST SERPL-CCNC: 15 UNIT/L (ref 5–34)
BASOPHILS # BLD AUTO: 0.02 X10(3)/MCL
BASOPHILS NFR BLD AUTO: 0.3 %
BILIRUB SERPL-MCNC: 0.3 MG/DL
BNP BLD-MCNC: 175.6 PG/ML
BUN SERPL-MCNC: 21.8 MG/DL (ref 9.8–20.1)
CALCIUM SERPL-MCNC: 8.7 MG/DL (ref 8.4–10.2)
CHLORIDE SERPL-SCNC: 104 MMOL/L (ref 98–107)
CO2 SERPL-SCNC: 27 MMOL/L (ref 23–31)
CREAT SERPL-MCNC: 1.46 MG/DL (ref 0.55–1.02)
CREAT/UREA NIT SERPL: 15
EOSINOPHIL # BLD AUTO: 0.26 X10(3)/MCL (ref 0–0.9)
EOSINOPHIL NFR BLD AUTO: 3.6 %
ERYTHROCYTE [DISTWIDTH] IN BLOOD BY AUTOMATED COUNT: 17.3 % (ref 11.5–17)
GFR SERPLBLD CREATININE-BSD FMLA CKD-EPI: 36 ML/MIN/1.73/M2
GLOBULIN SER-MCNC: 2.9 GM/DL (ref 2.4–3.5)
GLUCOSE SERPL-MCNC: 211 MG/DL (ref 82–115)
HCT VFR BLD AUTO: 33.6 % (ref 37–47)
HGB BLD-MCNC: 10.9 G/DL (ref 12–16)
IMM GRANULOCYTES # BLD AUTO: 0.03 X10(3)/MCL (ref 0–0.04)
IMM GRANULOCYTES NFR BLD AUTO: 0.4 %
LYMPHOCYTES # BLD AUTO: 1.8 X10(3)/MCL (ref 0.6–4.6)
LYMPHOCYTES NFR BLD AUTO: 25 %
MAGNESIUM SERPL-MCNC: 0.8 MG/DL (ref 1.6–2.6)
MCH RBC QN AUTO: 28.9 PG (ref 27–31)
MCHC RBC AUTO-ENTMCNC: 32.4 G/DL (ref 33–36)
MCV RBC AUTO: 89.1 FL (ref 80–94)
MONOCYTES # BLD AUTO: 0.69 X10(3)/MCL (ref 0.1–1.3)
MONOCYTES NFR BLD AUTO: 9.6 %
NEUTROPHILS # BLD AUTO: 4.39 X10(3)/MCL (ref 2.1–9.2)
NEUTROPHILS NFR BLD AUTO: 61.1 %
NRBC BLD AUTO-RTO: 0 %
PLATELET # BLD AUTO: 257 X10(3)/MCL (ref 130–400)
PMV BLD AUTO: 9.3 FL (ref 7.4–10.4)
POTASSIUM SERPL-SCNC: 5 MMOL/L (ref 3.5–5.1)
PROT SERPL-MCNC: 5.9 GM/DL (ref 5.8–7.6)
RBC # BLD AUTO: 3.77 X10(6)/MCL (ref 4.2–5.4)
SODIUM SERPL-SCNC: 138 MMOL/L (ref 136–145)
WBC # BLD AUTO: 7.19 X10(3)/MCL (ref 4.5–11.5)

## 2025-01-17 PROCEDURE — 99285 EMERGENCY DEPT VISIT HI MDM: CPT | Mod: 25

## 2025-01-17 PROCEDURE — 63600175 PHARM REV CODE 636 W HCPCS: Performed by: EMERGENCY MEDICINE

## 2025-01-17 PROCEDURE — 93005 ELECTROCARDIOGRAM TRACING: CPT

## 2025-01-17 PROCEDURE — 85025 COMPLETE CBC W/AUTO DIFF WBC: CPT

## 2025-01-17 PROCEDURE — 11000001 HC ACUTE MED/SURG PRIVATE ROOM

## 2025-01-17 PROCEDURE — 25000003 PHARM REV CODE 250: Performed by: EMERGENCY MEDICINE

## 2025-01-17 PROCEDURE — 83880 ASSAY OF NATRIURETIC PEPTIDE: CPT

## 2025-01-17 PROCEDURE — 63600175 PHARM REV CODE 636 W HCPCS: Performed by: INTERNAL MEDICINE

## 2025-01-17 PROCEDURE — 93010 ELECTROCARDIOGRAM REPORT: CPT | Mod: ,,, | Performed by: INTERNAL MEDICINE

## 2025-01-17 PROCEDURE — 25000003 PHARM REV CODE 250: Performed by: INTERNAL MEDICINE

## 2025-01-17 PROCEDURE — 80053 COMPREHEN METABOLIC PANEL: CPT

## 2025-01-17 PROCEDURE — 83735 ASSAY OF MAGNESIUM: CPT

## 2025-01-17 RX ORDER — ACETAMINOPHEN 325 MG/1
650 TABLET ORAL EVERY 8 HOURS PRN
Status: DISCONTINUED | OUTPATIENT
Start: 2025-01-17 | End: 2025-01-18 | Stop reason: HOSPADM

## 2025-01-17 RX ORDER — SODIUM CHLORIDE 9 MG/ML
1000 INJECTION, SOLUTION INTRAVENOUS
Status: COMPLETED | OUTPATIENT
Start: 2025-01-17 | End: 2025-01-17

## 2025-01-17 RX ORDER — MAGNESIUM SULFATE HEPTAHYDRATE 40 MG/ML
2 INJECTION, SOLUTION INTRAVENOUS
Status: COMPLETED | OUTPATIENT
Start: 2025-01-17 | End: 2025-01-17

## 2025-01-17 RX ORDER — SODIUM CHLORIDE 0.9 % (FLUSH) 0.9 %
10 SYRINGE (ML) INJECTION
Status: DISCONTINUED | OUTPATIENT
Start: 2025-01-17 | End: 2025-01-18 | Stop reason: HOSPADM

## 2025-01-17 RX ORDER — SODIUM CHLORIDE 9 MG/ML
INJECTION, SOLUTION INTRAVENOUS CONTINUOUS
Status: ACTIVE | OUTPATIENT
Start: 2025-01-17 | End: 2025-01-18

## 2025-01-17 RX ORDER — TALC
6 POWDER (GRAM) TOPICAL NIGHTLY PRN
Status: DISCONTINUED | OUTPATIENT
Start: 2025-01-17 | End: 2025-01-18 | Stop reason: HOSPADM

## 2025-01-17 RX ORDER — ENOXAPARIN SODIUM 100 MG/ML
30 INJECTION SUBCUTANEOUS EVERY 24 HOURS
Status: DISCONTINUED | OUTPATIENT
Start: 2025-01-17 | End: 2025-01-18 | Stop reason: HOSPADM

## 2025-01-17 RX ORDER — MAGNESIUM SULFATE 1 G/100ML
1 INJECTION INTRAVENOUS ONCE
Status: COMPLETED | OUTPATIENT
Start: 2025-01-17 | End: 2025-01-18

## 2025-01-17 RX ADMIN — SODIUM CHLORIDE: 9 INJECTION, SOLUTION INTRAVENOUS at 11:01

## 2025-01-17 RX ADMIN — ACETAMINOPHEN 650 MG: 325 TABLET ORAL at 11:01

## 2025-01-17 RX ADMIN — SODIUM CHLORIDE 1000 ML: 9 INJECTION, SOLUTION INTRAVENOUS at 06:01

## 2025-01-17 RX ADMIN — MAGNESIUM SULFATE IN DEXTROSE 1 G: 10 INJECTION, SOLUTION INTRAVENOUS at 11:01

## 2025-01-17 RX ADMIN — ENOXAPARIN SODIUM 30 MG: 30 INJECTION SUBCUTANEOUS at 11:01

## 2025-01-17 RX ADMIN — MAGNESIUM SULFATE HEPTAHYDRATE 2 G: 40 INJECTION, SOLUTION INTRAVENOUS at 06:01

## 2025-01-17 NOTE — ED PROVIDER NOTES
Encounter Date: 1/17/2025    SCRIBE #1 NOTE: I, Marely Woodard, am scribing for, and in the presence of,  Michael Wright MD. I have scribed the following portions of the note - Other sections scribed: HPI, ROS, PE.       History     Chief Complaint   Patient presents with    Leg Swelling     Pt c/o Pt sent here for abnormal labs and BLE edema. From [facility]. Pt denies symptoms at this time. No hx CHF.      Patient is an 80 y/o female with a PMHx of DM and HTN presents to the ED from NH for bilateral leg swelling and high potassium. Patient denies chest pain or shortness of breath.    The history is provided by the patient and medical records. No  was used.     Review of patient's allergies indicates:  No Known Allergies  Past Medical History:   Diagnosis Date    Diabetes mellitus     Hypertension      Past Surgical History:   Procedure Laterality Date    RETROGRADE INTRAMEDULLARY RODDING OF DISTAL FEMUR Left 11/27/2024    Procedure: INSERTION, INTRAMEDULLARY MARCOS, FEMUR, DISTAL, RETROGRADE;  Surgeon: Godwin Teixeira MD;  Location: [facility];  Service: Orthopedics;  Laterality: Left;     Family History   Problem Relation Name Age of Onset    No Known Problems Mother      No Known Problems Father      No Known Problems Sister      No Known Problems Brother       Social History     Tobacco Use    Smoking status: Never    Smokeless tobacco: Never   Substance Use Topics    Alcohol use: Not Currently     Review of Systems   Constitutional:  Negative for chills and fever.   Respiratory:  Negative for cough and shortness of breath.    Cardiovascular:  Positive for leg swelling. Negative for chest pain.   Gastrointestinal:  Negative for abdominal pain, nausea and vomiting.   Musculoskeletal:  Negative for myalgias.   All other systems reviewed and are negative.      Physical Exam     Initial Vitals [01/17/25 1503]   BP Pulse Resp Temp SpO2   (!) 136/53 74 20 98.3 °F (36.8 °C) 98 %      MAP       --          Physical Exam    Nursing note and vitals reviewed.  Constitutional: She appears well-developed. She is Obese . No distress.   HENT:   Head: Normocephalic and atraumatic.   Eyes: Conjunctivae are normal.   Cardiovascular:  Normal rate and intact distal pulses.           Pulmonary/Chest: No respiratory distress. She has no rhonchi.   Abdominal: Abdomen is soft. Bowel sounds are normal. There is no abdominal tenderness. There is no rebound and no guarding.   Musculoskeletal:         General: No edema.      Comments: Trace edema to bilateral lower extremity.     Neurological: She is alert. She has normal strength.   Skin: Skin is warm and dry.   Psychiatric: She has a normal mood and affect.         ED Course   Procedures  Labs Reviewed   COMPREHENSIVE METABOLIC PANEL - Abnormal       Result Value    Sodium 138      Potassium 5.0      Chloride 104      CO2 27      Glucose 211 (*)     Blood Urea Nitrogen 21.8 (*)     Creatinine 1.46 (*)     Calcium 8.7      Protein Total 5.9      Albumin 3.0 (*)     Globulin 2.9      Albumin/Globulin Ratio 1.0 (*)     Bilirubin Total 0.3      ALP 72      ALT 8      AST 15      eGFR 36      Anion Gap 7.0      BUN/Creatinine Ratio 15     MAGNESIUM - Abnormal    Magnesium Level 0.80 (*)    B-TYPE NATRIURETIC PEPTIDE - Abnormal    Natriuretic Peptide 175.6 (*)    CBC WITH DIFFERENTIAL - Abnormal    WBC 7.19      RBC 3.77 (*)     Hgb 10.9 (*)     Hct 33.6 (*)     MCV 89.1      MCH 28.9      MCHC 32.4 (*)     RDW 17.3 (*)     Platelet 257      MPV 9.3      Neut % 61.1      Lymph % 25.0      Mono % 9.6      Eos % 3.6      Basophil % 0.3      Imm Grans % 0.4      Neut # 4.39      Lymph # 1.80      Mono # 0.69      Eos # 0.26      Baso # 0.02      Imm Gran # 0.03      NRBC% 0.0     CBC W/ AUTO DIFFERENTIAL    Narrative:     The following orders were created for panel order CBC auto differential.  Procedure                               Abnormality         Status                     ---------                                -----------         ------                     CBC with Differential[[id number]]       Abnormal            Final result                 Please view results for these tests on the individual orders.          Imaging Results    None          Medications   magnesium sulfate 2g in water 50mL IVPB (premix) (has no administration in time range)   0.9% NaCl infusion (has no administration in time range)     Medical Decision Making  The differential diagnosis includes, but is not limited to, CHF, dehydration, and kidney injury.    No distress.  2+ dorsalis pedis pulses trace edema lower extremities.  You get ultrasound to evaluate for DVT but low suspicion.  Discussed with the hospitalist this test is pending.  Magnesium is critically low she is on potassium supplementation as whenever home medications.  Given 2 g IV will admit for further workup and management. Discussed with ERNESTO Swanson will admit    Problems Addressed:  Hypomagnesemia: acute illness or injury that poses a threat to life or bodily functions    Amount and/or Complexity of Data Reviewed  Labs: ordered.    Risk  Prescription drug management.  Decision regarding hospitalization.            Scribe Attestation:   Scribe #1: I performed the above scribed service and the documentation accurately describes the services I performed. I attest to the accuracy of the note.    Attending Attestation:           Physician Attestation for Scribe:  Physician Attestation Statement for Scribe #1: I, Michael Wright MD, reviewed documentation, as scribed by Marely Woodard in my presence, and it is both accurate and complete.                                    Clinical Impression:  Final diagnoses:  [M79.89] Leg swelling  [E83.42] Hypomagnesemia (Primary)  [N17.9] BRYNN (acute kidney injury)          ED Disposition Condition    Admit Stable                Michael Wright MD  01/17/25 [id number]

## 2025-01-17 NOTE — FIRST PROVIDER EVALUATION
"Medical screening examination initiated.  I have conducted a focused provider triage encounter, findings are as follows:    Brief history of present illness:  arrived to ED due to abnormal labs and BLE edema. Currently at [facility] due to recent femur fracture. Was instructed to come to ER for further evaluation.     Vitals:    01/17/25 1503   BP: (!) 136/53   Pulse: 74   Resp: 20   Temp: 98.3 °F (36.8 °C)   TempSrc: Oral   SpO2: 98%   Weight: 78 kg (172 lb)   Height: 4' 11" (1.499 m)       Pertinent physical exam:  awake, alert, has non-labored breathing, in wheelchair    Brief workup plan:  labs     Preliminary workup initiated; this workup will be continued and followed by the physician or advanced practice provider that is assigned to the patient when roomed.  "

## 2025-01-18 VITALS
HEIGHT: 59 IN | BODY MASS INDEX: 34.68 KG/M2 | SYSTOLIC BLOOD PRESSURE: 116 MMHG | WEIGHT: 172 LBS | TEMPERATURE: 98 F | OXYGEN SATURATION: 98 % | RESPIRATION RATE: 18 BRPM | HEART RATE: 60 BPM | DIASTOLIC BLOOD PRESSURE: 62 MMHG

## 2025-01-18 LAB
ALBUMIN SERPL-MCNC: 2.6 G/DL (ref 3.4–4.8)
ALBUMIN/GLOB SERPL: 0.9 RATIO (ref 1.1–2)
ALP SERPL-CCNC: 56 UNIT/L (ref 40–150)
ALT SERPL-CCNC: 8 UNIT/L (ref 0–55)
ANION GAP SERPL CALC-SCNC: 7 MEQ/L
APICAL FOUR CHAMBER EJECTION FRACTION: 63 %
AST SERPL-CCNC: 14 UNIT/L (ref 5–34)
AV INDEX (PROSTH): 0.75
AV MEAN GRADIENT: 5 MMHG
AV PEAK GRADIENT: 9 MMHG
AV VALVE AREA BY VELOCITY RATIO: 1.9 CM²
AV VALVE AREA: 1.9 CM²
AV VELOCITY RATIO: 0.73
BACTERIA #/AREA URNS AUTO: NORMAL /HPF
BASOPHILS # BLD AUTO: 0.02 X10(3)/MCL
BASOPHILS NFR BLD AUTO: 0.2 %
BILIRUB SERPL-MCNC: 0.3 MG/DL
BILIRUB UR QL STRIP.AUTO: NEGATIVE
BSA FOR ECHO PROCEDURE: 1.8 M2
BUN SERPL-MCNC: 17.8 MG/DL (ref 9.8–20.1)
CALCIUM SERPL-MCNC: 8.6 MG/DL (ref 8.4–10.2)
CHLORIDE SERPL-SCNC: 107 MMOL/L (ref 98–107)
CLARITY UR: CLEAR
CO2 SERPL-SCNC: 24 MMOL/L (ref 23–31)
COLOR UR AUTO: COLORLESS
CREAT SERPL-MCNC: 1.13 MG/DL (ref 0.55–1.02)
CREAT/UREA NIT SERPL: 16
CV ECHO LV RWT: 0.5 CM
DOP CALC AO PEAK VEL: 1.5 M/S
DOP CALC AO VTI: 34.9 CM
DOP CALC LVOT AREA: 2.5 CM2
DOP CALC LVOT DIAMETER: 1.8 CM
DOP CALC LVOT PEAK VEL: 1.1 M/S
DOP CALC LVOT STROKE VOLUME: 66.4 CM3
DOP CALC MV VTI: 31.1 CM
DOP CALCLVOT PEAK VEL VTI: 26.1 CM
E WAVE DECELERATION TIME: 232 MSEC
E/A RATIO: 0.92
E/E' RATIO: 9 M/S
ECHO LV POSTERIOR WALL: 1 CM (ref 0.6–1.1)
EOSINOPHIL # BLD AUTO: 0.26 X10(3)/MCL (ref 0–0.9)
EOSINOPHIL NFR BLD AUTO: 3.2 %
ERYTHROCYTE [DISTWIDTH] IN BLOOD BY AUTOMATED COUNT: 17.2 % (ref 11.5–17)
FRACTIONAL SHORTENING: 42.5 % (ref 28–44)
GFR SERPLBLD CREATININE-BSD FMLA CKD-EPI: 49 ML/MIN/1.73/M2
GLOBULIN SER-MCNC: 2.8 GM/DL (ref 2.4–3.5)
GLUCOSE SERPL-MCNC: 175 MG/DL (ref 82–115)
GLUCOSE UR QL STRIP: NORMAL
HCT VFR BLD AUTO: 31.6 % (ref 37–47)
HGB BLD-MCNC: 10.2 G/DL (ref 12–16)
HGB UR QL STRIP: NEGATIVE
IMM GRANULOCYTES # BLD AUTO: 0.02 X10(3)/MCL (ref 0–0.04)
IMM GRANULOCYTES NFR BLD AUTO: 0.2 %
INTERVENTRICULAR SEPTUM: 0.9 CM (ref 0.6–1.1)
KETONES UR QL STRIP: NEGATIVE
LEFT ATRIUM AREA SYSTOLIC (APICAL 2 CHAMBER): 12.3 CM2
LEFT ATRIUM AREA SYSTOLIC (APICAL 4 CHAMBER): 14.7 CM2
LEFT ATRIUM SIZE: 3.8 CM
LEFT ATRIUM VOLUME INDEX MOD: 18 ML/M2
LEFT ATRIUM VOLUME MOD: 31 ML
LEFT INTERNAL DIMENSION IN SYSTOLE: 2.3 CM (ref 2.1–4)
LEFT VENTRICLE DIASTOLIC VOLUME INDEX: 40.46 ML/M2
LEFT VENTRICLE DIASTOLIC VOLUME: 70 ML
LEFT VENTRICLE END DIASTOLIC VOLUME APICAL 4 CHAMBER: 53.8 ML
LEFT VENTRICLE END SYSTOLIC VOLUME APICAL 2 CHAMBER: 26.3 ML
LEFT VENTRICLE END SYSTOLIC VOLUME APICAL 4 CHAMBER: 34.9 ML
LEFT VENTRICLE MASS INDEX: 68.3 G/M2
LEFT VENTRICLE SYSTOLIC VOLUME INDEX: 10.5 ML/M2
LEFT VENTRICLE SYSTOLIC VOLUME: 18.1 ML
LEFT VENTRICULAR INTERNAL DIMENSION IN DIASTOLE: 4 CM (ref 3.5–6)
LEFT VENTRICULAR MASS: 118.2 G
LEUKOCYTE ESTERASE UR QL STRIP: NEGATIVE
LV LATERAL E/E' RATIO: 7.7 M/S
LV SEPTAL E/E' RATIO: 9.4 M/S
LVED V (TEICH): 70 ML
LVES V (TEICH): 18.1 ML
LVOT MG: 3 MMHG
LVOT MV: 0.73 CM/S
LYMPHOCYTES # BLD AUTO: 2.16 X10(3)/MCL (ref 0.6–4.6)
LYMPHOCYTES NFR BLD AUTO: 26.4 %
MAGNESIUM SERPL-MCNC: 1.7 MG/DL (ref 1.6–2.6)
MCH RBC QN AUTO: 28.6 PG (ref 27–31)
MCHC RBC AUTO-ENTMCNC: 32.3 G/DL (ref 33–36)
MCV RBC AUTO: 88.5 FL (ref 80–94)
MONOCYTES # BLD AUTO: 0.92 X10(3)/MCL (ref 0.1–1.3)
MONOCYTES NFR BLD AUTO: 11.3 %
MV MEAN GRADIENT: 2 MMHG
MV PEAK A VEL: 0.92 M/S
MV PEAK E VEL: 0.85 M/S
MV PEAK GRADIENT: 4 MMHG
MV STENOSIS PRESSURE HALF TIME: 104 MS
MV VALVE AREA BY CONTINUITY EQUATION: 2.13 CM2
MV VALVE AREA P 1/2 METHOD: 2.12 CM2
NEUTROPHILS # BLD AUTO: 4.79 X10(3)/MCL (ref 2.1–9.2)
NEUTROPHILS NFR BLD AUTO: 58.7 %
NITRITE UR QL STRIP: NEGATIVE
NRBC BLD AUTO-RTO: 0 %
PH UR STRIP: 7 [PH]
PISA TR MAX VEL: 1.2 M/S
PLATELET # BLD AUTO: 239 X10(3)/MCL (ref 130–400)
PMV BLD AUTO: 9.6 FL (ref 7.4–10.4)
POCT GLUCOSE: 157 MG/DL (ref 70–110)
POTASSIUM SERPL-SCNC: 4.8 MMOL/L (ref 3.5–5.1)
PROT SERPL-MCNC: 5.4 GM/DL (ref 5.8–7.6)
PROT UR QL STRIP: NEGATIVE
PV PEAK GRADIENT: 3 MMHG
PV PEAK VELOCITY: 0.87 M/S
RA PRESSURE ESTIMATED: 3 MMHG
RBC # BLD AUTO: 3.57 X10(6)/MCL (ref 4.2–5.4)
RBC #/AREA URNS AUTO: NORMAL /HPF
RV TB RVSP: 4 MMHG
SODIUM SERPL-SCNC: 138 MMOL/L (ref 136–145)
SP GR UR STRIP.AUTO: 1.01 (ref 1–1.03)
SQUAMOUS #/AREA URNS LPF: NORMAL /HPF
TDI LATERAL: 0.11 M/S
TDI SEPTAL: 0.09 M/S
TDI: 0.1 M/S
TR MAX PG: 6 MMHG
TRICUSPID ANNULAR PLANE SYSTOLIC EXCURSION: 1.7 CM
TV REST PULMONARY ARTERY PRESSURE: 9 MMHG
UROBILINOGEN UR STRIP-ACNC: NORMAL
WBC # BLD AUTO: 8.17 X10(3)/MCL (ref 4.5–11.5)
WBC #/AREA URNS AUTO: NORMAL /HPF
Z-SCORE OF LEFT VENTRICULAR DIMENSION IN END DIASTOLE: -1.81
Z-SCORE OF LEFT VENTRICULAR DIMENSION IN END SYSTOLE: -2.01

## 2025-01-18 PROCEDURE — 25000003 PHARM REV CODE 250: Performed by: INTERNAL MEDICINE

## 2025-01-18 PROCEDURE — 83735 ASSAY OF MAGNESIUM: CPT | Performed by: INTERNAL MEDICINE

## 2025-01-18 PROCEDURE — 85025 COMPLETE CBC W/AUTO DIFF WBC: CPT | Performed by: INTERNAL MEDICINE

## 2025-01-18 PROCEDURE — 81001 URINALYSIS AUTO W/SCOPE: CPT | Performed by: INTERNAL MEDICINE

## 2025-01-18 PROCEDURE — 36415 COLL VENOUS BLD VENIPUNCTURE: CPT | Performed by: INTERNAL MEDICINE

## 2025-01-18 PROCEDURE — 80053 COMPREHEN METABOLIC PANEL: CPT | Performed by: INTERNAL MEDICINE

## 2025-01-18 PROCEDURE — 63600175 PHARM REV CODE 636 W HCPCS: Performed by: INTERNAL MEDICINE

## 2025-01-18 RX ORDER — SULFAMETHOXAZOLE AND TRIMETHOPRIM 800; 160 MG/1; MG/1
1 TABLET ORAL 2 TIMES DAILY
Status: ON HOLD | COMMUNITY
End: 2025-01-18 | Stop reason: HOSPADM

## 2025-01-18 RX ORDER — GLUCAGON 1 MG
1 KIT INJECTION
Status: DISCONTINUED | OUTPATIENT
Start: 2025-01-18 | End: 2025-01-18 | Stop reason: HOSPADM

## 2025-01-18 RX ORDER — LEVOTHYROXINE SODIUM 88 UG/1
88 TABLET ORAL
Status: DISCONTINUED | OUTPATIENT
Start: 2025-01-18 | End: 2025-01-18 | Stop reason: HOSPADM

## 2025-01-18 RX ORDER — ATORVASTATIN CALCIUM 40 MG/1
40 TABLET, FILM COATED ORAL DAILY
Status: DISCONTINUED | OUTPATIENT
Start: 2025-01-18 | End: 2025-01-18 | Stop reason: HOSPADM

## 2025-01-18 RX ORDER — DEXTROMETHORPHAN HYDROBROMIDE, GUAIFENESIN 5; 100 MG/5ML; MG/5ML
650 LIQUID ORAL EVERY 8 HOURS PRN
Start: 2025-01-18

## 2025-01-18 RX ORDER — CHOLESTYRAMINE 4 G/4.8G
4 POWDER, FOR SUSPENSION ORAL DAILY PRN
Start: 2025-01-18

## 2025-01-18 RX ORDER — OXYCODONE HYDROCHLORIDE 10 MG/1
10 TABLET ORAL EVERY 6 HOURS PRN
Status: DISCONTINUED | OUTPATIENT
Start: 2025-01-18 | End: 2025-01-18 | Stop reason: HOSPADM

## 2025-01-18 RX ORDER — CARVEDILOL 3.12 MG/1
6.25 TABLET ORAL 2 TIMES DAILY
Status: DISCONTINUED | OUTPATIENT
Start: 2025-01-18 | End: 2025-01-18 | Stop reason: HOSPADM

## 2025-01-18 RX ORDER — DEXTROMETHORPHAN HYDROBROMIDE, GUAIFENESIN 5; 100 MG/5ML; MG/5ML
650 LIQUID ORAL EVERY 4 HOURS
Status: ON HOLD | COMMUNITY
End: 2025-01-18

## 2025-01-18 RX ORDER — MIRTAZAPINE 15 MG/1
15 TABLET, FILM COATED ORAL NIGHTLY
Status: DISCONTINUED | OUTPATIENT
Start: 2025-01-18 | End: 2025-01-18 | Stop reason: HOSPADM

## 2025-01-18 RX ORDER — INSULIN ASPART 100 [IU]/ML
0-10 INJECTION, SOLUTION INTRAVENOUS; SUBCUTANEOUS
Status: DISCONTINUED | OUTPATIENT
Start: 2025-01-18 | End: 2025-01-18 | Stop reason: HOSPADM

## 2025-01-18 RX ORDER — FUROSEMIDE 20 MG/1
20 TABLET ORAL DAILY
Status: ON HOLD | COMMUNITY
End: 2025-01-18

## 2025-01-18 RX ORDER — MAGNESIUM SULFATE 1 G/100ML
1 INJECTION INTRAVENOUS ONCE
Status: COMPLETED | OUTPATIENT
Start: 2025-01-18 | End: 2025-01-18

## 2025-01-18 RX ORDER — CHOLESTYRAMINE 4 G/4.8G
4 POWDER, FOR SUSPENSION ORAL DAILY
Status: ON HOLD | COMMUNITY
End: 2025-01-18

## 2025-01-18 RX ORDER — CLOPIDOGREL BISULFATE 75 MG/1
75 TABLET ORAL DAILY
Status: DISCONTINUED | OUTPATIENT
Start: 2025-01-18 | End: 2025-01-18 | Stop reason: HOSPADM

## 2025-01-18 RX ORDER — IBUPROFEN 200 MG
24 TABLET ORAL
Status: DISCONTINUED | OUTPATIENT
Start: 2025-01-18 | End: 2025-01-18 | Stop reason: HOSPADM

## 2025-01-18 RX ORDER — ISOSORBIDE DINITRATE 30 MG/1
30 TABLET ORAL DAILY
COMMUNITY

## 2025-01-18 RX ORDER — GABAPENTIN 600 MG/1
300 TABLET ORAL 3 TIMES DAILY
Start: 2025-01-18

## 2025-01-18 RX ORDER — INSULIN GLARGINE 100 [IU]/ML
14 INJECTION, SOLUTION SUBCUTANEOUS NIGHTLY
Start: 2025-01-18 | End: 2025-02-17

## 2025-01-18 RX ORDER — IBUPROFEN 200 MG
16 TABLET ORAL
Status: DISCONTINUED | OUTPATIENT
Start: 2025-01-18 | End: 2025-01-18 | Stop reason: HOSPADM

## 2025-01-18 RX ORDER — INSULIN GLARGINE 100 [IU]/ML
10 INJECTION, SOLUTION SUBCUTANEOUS NIGHTLY
Status: DISCONTINUED | OUTPATIENT
Start: 2025-01-18 | End: 2025-01-18 | Stop reason: HOSPADM

## 2025-01-18 RX ORDER — FUROSEMIDE 20 MG/1
20 TABLET ORAL DAILY PRN
Start: 2025-01-18

## 2025-01-18 RX ADMIN — SODIUM CHLORIDE: 9 INJECTION, SOLUTION INTRAVENOUS at 02:01

## 2025-01-18 RX ADMIN — MAGNESIUM SULFATE HEPTAHYDRATE 1 G: 10 INJECTION, SOLUTION INTRAVENOUS at 08:01

## 2025-01-18 RX ADMIN — ATORVASTATIN CALCIUM 40 MG: 40 TABLET, FILM COATED ORAL at 08:01

## 2025-01-18 RX ADMIN — LEVOTHYROXINE SODIUM 88 MCG: 88 TABLET ORAL at 05:01

## 2025-01-18 RX ADMIN — ACETAMINOPHEN 650 MG: 325 TABLET ORAL at 08:01

## 2025-01-18 RX ADMIN — CLOPIDOGREL BISULFATE 75 MG: 75 TABLET ORAL at 08:01

## 2025-01-18 RX ADMIN — CARVEDILOL 6.25 MG: 3.12 TABLET, FILM COATED ORAL at 08:01

## 2025-01-18 RX ADMIN — MIRTAZAPINE 15 MG: 15 TABLET, FILM COATED ORAL at 05:01

## 2025-01-18 NOTE — PLAN OF CARE
1240pm facility confirmed residence - hospice. Facility setting up transportation and will readmit on today. Discharge info sent via Epic and discharge packet prepared and given to RN    1224pm FENG confirmed that patient is in fact a return to [facility] at [facility]. Awaiting to hear back from Triny with confirmation to being discharge.    Phoned Sunday at [facility] and spoke with Supervisor, Triny. She informed that she was unable to find the patient's records anywhere for readmittance/return to their facility. She took my contact information and stated that she will look around and will return my call. FENG notified.

## 2025-01-18 NOTE — H&P
[facility] Hospital Medicine History & Physical Examination       Patient Name: Gabriela Lozano  MRN: [id number]  Patient Class: IP- Inpatient   Admission Date: 1/17/2025  3:05 PM  Length of Stay: 0  Admitting Service: Hospital Medicine   Attending Physician: Adilson Zavala MD   Primary Care Provider: Petr, Monica WEISS MD  History source: EMR, patient and/or patient's family    CHIEF COMPLAINT   Leg Swelling (Pt c/o Pt sent here for abnormal labs and BLE edema. From [facility] [facility]. Pt denies symptoms at this time. No hx CHF. )    HISTORY OF PRESENT ILLNESS:   Patient is an 79-year-old female with a history of hypothyroidism, DM2, HTN recent left femur repair surgery about a month ago for which she reports at that time she was transferred to inpatient physical rehab presented to the ER for workup of lower extremity swelling as well as reportedly outpatient laboratory work noting hyperkalemia.  Patient denied any complaints.  Patient does state she believes she will be permanently and a facility as she used to live in an apartment but no longer can take care of herself at home.    Patient arrived to the ER afebrile hemodynamically stable maintaining normal sats on room air.  Laboratory work showed a mild acute kidney injury, a potassium of 5.0, mild hyperglycemia, a magnesium of 0.8, and a BNP of 175.  Bilateral venous ultrasounds of the lower extremities were unremarkable for DVT.  Hospitalist service was consulted for further evaluation.    PAST MEDICAL HISTORY:     Past Medical History:   Diagnosis Date    Diabetes mellitus     Hypertension    Degenerative disc disease       PAST SURGICAL HISTORY:     Past Surgical History:   Procedure Laterality Date    RETROGRADE INTRAMEDULLARY RODDING OF DISTAL FEMUR Left 11/27/2024    Procedure: INSERTION, INTRAMEDULLARY MARCOS, FEMUR, DISTAL, RETROGRADE;  Surgeon: Godwin Teixeira MD;  Location: [facility];  Service: Orthopedics;   Laterality: Left;       ALLERGIES:   Patient has no known allergies.    FAMILY HISTORY:   Reviewed and non-contributory     SOCIAL HISTORY:   Screening for Social Drivers for health: Patient screened for food insecurity, housing instability, transportation needs, utility   difficulties, and interpersonal safety (select all that apply as identified as concern)  []Housing or Food  []Transportation Needs  []Utility Difficulties  []Interpersonal safety  [x]None  Social History     Tobacco Use    Smoking status: Never    Smokeless tobacco: Never   Substance Use Topics    Alcohol use: Not Currently        HOME MEDICATIONS:     Prior to Admission medications    Medication Sig Start Date End Date Taking? Authorizing Provider   atorvastatin (LIPITOR) 40 MG tablet Take 40 mg by mouth once daily.    Provider, Historical   carvediloL (COREG) 3.125 MG tablet Take 6.25 mg by mouth 2 (two) times daily. 7/15/24   Provider, Historical   clopidogreL (PLAVIX) 75 mg tablet Take 75 mg by mouth once daily.    Provider, Historical   enoxaparin (LOVENOX) 40 mg/0.4 mL Syrg Inject into the skin. 12/30/24   Provider, Historical   gabapentin (NEURONTIN) 600 MG tablet Take 600 mg by mouth 2 (two) times daily. 7/10/24   Provider, Historical   insulin aspart U-100 (NOVOLOG) 100 unit/mL (3 mL) InPn pen  12/5/24   Provider, Historical   insulin glargine U-100, Lantus, 100 unit/mL injection Inject 10 Units into the skin every evening. 12/5/24 1/4/25  Ankita Call FNP   isosorbide mononitrate (IMDUR) 30 MG 24 hr tablet Take 30 mg by mouth once daily. 6/11/24   Provider, Historical   lactulose (CHRONULAC) 10 gram/15 mL solution SMARTSIG:Milliliter(s) By Mouth 12/11/24   Provider, Historical   levothyroxine (SYNTHROID) 88 MCG tablet Take 1 tablet (88 mcg total) by mouth before breakfast. 12/6/24 1/5/25  Ankita Call FNP   lisinopriL (PRINIVIL,ZESTRIL) 20 MG tablet Take 20 mg by mouth once daily.    Provider, Historical   loperamide  "(IMODIUM) 2 mg capsule Take by mouth. 1/7/25   Provider, Historical   metFORMIN (GLUCOPHAGE) 1000 MG tablet Take 1,000 mg by mouth 2 (two) times daily.    Provider, Historical   mirtazapine (REMERON) 15 MG tablet Take 15 mg by mouth every evening. 12/18/24   Provider, Historical   NOVOLIN R FLEXPEN 100 unit/mL (3 mL) InPn pen  12/9/24   Provider, Historical   oxyCODONE (ROXICODONE) 10 mg Tab immediate release tablet Take 1 tablet (10 mg total) by mouth every 6 (six) hours as needed for Pain. 12/5/24   Ankita Call FNP       REVIEW OF SYSTEMS:   Except as documented, all other systems reviewed and negative     PHYSICAL EXAM:   T 98.3 °F (36.8 °C)   /64   P 83   RR 18   O2 98 %  GENERAL: awake, alert, oriented and in no acute distress, non-toxic appearing   HEENT: normocephalic atraumatic   NECK: supple left lower extremity surgical scars well healed  LUNGS: Clear bilaterally, no wheezing or rales, no accessory muscle use   CVS: Regular rate and rhythm, normal peripheral perfusion  ABD: Soft, non-tender, non-distended, bowel sounds present  EXTREMITIES: no clubbing or cyanosis  SKIN: Warm, dry.   NEURO: alert and oriented, grossly without focal deficits, bilateral lower extremity weakness   PSYCHIATRIC: Cooperative    LABS AND IMAGING:     Recent Labs     01/17/25  1519   WBC 7.19   RBC 3.77*   HGB 10.9*   HCT 33.6*   MCV 89.1   MCH 28.9   MCHC 32.4*   RDW 17.3*        No results for input(s): "LACTIC" in the last 72 hours.  No results for input(s): "INR", "APTT", "D-DIMER" in the last 72 hours.  No results for input(s): "HGBA1C", "CHOL", "TRIG", "LDL", "VLDL", "HDL" in the last 72 hours.   Recent Labs     01/17/25  0745 01/17/25  1519    138   K 5.1 5.0   CO2 27 27   BUN 21.4* 21.8*   CREATININE 1.26* 1.46*   GLUCOSE 116* 211*   CALCIUM 8.7 8.7   MG 0.80* 0.80*   ALBUMIN  --  3.0*   GLOBULIN  --  2.9   ALKPHOS  --  72   ALT  --  8   AST  --  15   BILITOT  --  0.3     Recent Labs     " 01/17/25  1519   .6*          X-Ray Femur 2 View Left  See Provider Notes for results.     IMPRESSION: Please see provider office/clinic notes for radiology   interpretation    This procedure was auto-finalized by: Virtual Radiologist      ASSESSMENT & PLAN:   Lower extremity edema   Hypomagnesemia   Mild acute kidney injury  Uncontrolled type 2 diabetes mellitus   Recent UTI, placed on Bactrim    Hx:  IDDM2, CAD/Plavix, peripheral neuropathy, HLD, HTN, hypothyroidism    -gentle IV fluids overnight, renal ultrasound  -holding Lasix, lisinopril  -obtain echo, obtain EKG, obtain urinalysis  -aggressively replete magnesium  -check UA, discontinue Bactrim, if UA positive use Macrobid  -resume home meds as appropriate pending med rec process    DVT prophylaxis: lovenox  Code status: full    If patient was admitted under observational status it is with my approval/permission.     At least 55 min was spent on this history and physical.  Time seen: 11PM 1/17  Adilson Zavala MD

## 2025-01-18 NOTE — PLAN OF CARE
Problem: Adult Inpatient Plan of Care  Goal: Plan of Care Review  Outcome: Progressing  Goal: Patient-Specific Goal (Individualized)  Outcome: Progressing  Goal: Absence of Hospital-Acquired Illness or Injury  Outcome: Progressing  Goal: Optimal Comfort and Wellbeing  Outcome: Progressing  Goal: Readiness for Transition of Care  Outcome: Progressing     Problem: Diabetes Comorbidity  Goal: Blood Glucose Level Within Targeted Range  Outcome: Progressing     Problem: Acute Kidney Injury/Impairment  Goal: Fluid and Electrolyte Balance  Outcome: Progressing  Goal: Improved Oral Intake  Outcome: Progressing  Goal: Effective Renal Function  Outcome: Progressing     Problem: Skin Injury Risk Increased  Goal: Skin Health and Integrity  Outcome: Progressing     Problem: Fall Injury Risk  Goal: Absence of Fall and Fall-Related Injury  Outcome: Progressing

## 2025-01-18 NOTE — DISCHARGE SUMMARY
[facility] Hospital Medicine Discharge Summary    Admit Date: 1/17/2025  Discharge Date and Time: 1/18/202511:32 AM  Admitting Physician:  Team  Discharging Physician: Orquidea Reese MD.  Primary Care Physician: Monica Humphreys MD      Discharge Diagnoses:  Mild lower extremity swelling-resolved   Mild acute kidney injury-improved   History of hypothyroidism   Recent femoral fracture repair   Type 2 diabetes mellitus   Essential HTN       Hospital Course:   79-year-old female with significant history of hypothyroidism, type 2 diabetes mellitus, HTN, recent left femur repair surgery about a month back following which he required inpatient rehab stay.  Patient was sent to the ED for further evaluation of lower extremity swelling and as well as abnormal lab work which included hypokalemia, patient denied any complaints.  Patient was hemodynamically stable in the ED, respiratory status stable, lab significant for mild acute kidney injury, potassium normal, also had mild hyperglycemia, hypo magnesemia.  DVT ruled out, patient was admitted to hospital medicine services echocardiogram was ordered to further evaluate lower extremity swelling.  Echocardiogram also came back unremarkable, patient was evaluated on 01/18, no much leg swelling noted, hemodynamics and labs stable, therefore it was decided to discharge her back to the facility she came from since workup was negative and there was not much lower extremity swelling, patient discharged back to nursing home in stable condition, discharge medications per med rec  Vitals:  VITAL SIGNS: 24 HRS MIN & MAX LAST   Temp  Min: 97.4 °F (36.3 °C)  Max: 98.3 °F (36.8 °C) 97.6 °F (36.4 °C)   BP  Min: 110/73  Max: 142/52 116/62   Pulse  Min: 60  Max: 89  60   Resp  Min: 18  Max: 23 18   SpO2  Min: 95 %  Max: 98 % 98 %       Physical Exam:  General appearance:  No acute distress  HENT: Atraumatic   Lungs: Clear to auscultation bilaterally.   Heart:  RRR,No edema  Abdomen: Soft, non tender   Extremities: warm  Neuro:  Awake, alert,   Psych/mental status: Appropriate mood and affect.    Procedures Performed: No admission procedures for hospital encounter.     Significant Diagnostic Studies: See Full reports for all details    Recent Labs   Lab 01/17/25  1519 01/18/25  0247   WBC 7.19 8.17   RBC 3.77* 3.57*   HGB 10.9* 10.2*   HCT 33.6* 31.6*   MCV 89.1 88.5   MCH 28.9 28.6   MCHC 32.4* 32.3*   RDW 17.3* 17.2*    239   MPV 9.3 9.6       Recent Labs   Lab 01/17/25  0745 01/17/25  1519 01/18/25  0247    138 138   K 5.1 5.0 4.8    104 107   CO2 27 27 24   BUN 21.4* 21.8* 17.8   CREATININE 1.26* 1.46* 1.13*   CALCIUM 8.7 8.7 8.6   MG 0.80* 0.80* 1.70   ALBUMIN  --  3.0* 2.6*   ALKPHOS  --  72 56   ALT  --  8 8   AST  --  15 14   BILITOT  --  0.3 0.3        Microbiology Results (last 7 days)       ** No results found for the last 168 hours. **             US Retroperitoneal Complete  Narrative: EXAMINATION:  US RETROPERITONEAL COMPLETE    CLINICAL HISTORY:  mian;    COMPARISON:  19 December 2022    FINDINGS:  Grayscale and color Doppler sonographic evaluation of the kidneys and urinary bladder.    The right kidney measures 10-11 cm. The left kidney measures 9-10 cm.   Kidneys are poorly visualized.  No hydronephrosis.  There is a 1-2 cm right renal cyst for which no follow-up is needed.    No significant abnormality of the urinary bladder.  Impression: No hydronephrosis.    Electronically signed by: David Lomeli  Date:    01/18/2025  Time:    09:41         Medication List        CHANGE how you take these medications      acetaminophen 650 MG Tbsr  Commonly known as: TYLENOL  Take 1 tablet (650 mg total) by mouth every 8 (eight) hours as needed (Pain).  What changed:   when to take this  reasons to take this     cholestyramine-aspartame 4 gram Pwpk  Commonly known as: CHOLESTYRAMINE LIGHT  Take 1 packet (4 grams of anhydrous cholestyramine total) by  mouth daily as needed (Diarrhea).  What changed:   when to take this  reasons to take this     furosemide 20 MG tablet  Commonly known as: LASIX  Take 1 tablet (20 mg total) by mouth daily as needed (In case of swelling, weight gain more than 2 lb overnight).  What changed:   when to take this  reasons to take this     gabapentin 600 MG tablet  Commonly known as: NEURONTIN  Take 0.5 tablets (300 mg total) by mouth 3 (three) times daily.  What changed: how much to take     insulin glargine U-100 (Lantus) 100 unit/mL injection  Inject 14 Units into the skin every evening.  What changed: how much to take            CONTINUE taking these medications      atorvastatin 40 MG tablet  Commonly known as: LIPITOR     carvediloL 3.125 MG tablet  Commonly known as: COREG     clopidogreL 75 mg tablet  Commonly known as: PLAVIX     isosorbide dinitrate 30 MG Tab  Commonly known as: ISORDIL     levothyroxine 88 MCG tablet  Commonly known as: SYNTHROID  Take 1 tablet (88 mcg total) by mouth before breakfast.     mirtazapine 15 MG tablet  Commonly known as: REMERON     NovoLIN R FlexPen 100 unit/mL (3 mL) Inpn pen  Generic drug: insulin regular human     oxyCODONE 10 mg Tab immediate release tablet  Commonly known as: ROXICODONE  Take 1 tablet (10 mg total) by mouth every 6 (six) hours as needed for Pain.            STOP taking these medications      enoxaparin 40 mg/0.4 mL Syrg  Commonly known as: LOVENOX     insulin aspart U-100 100 unit/mL (3 mL) Inpn pen  Commonly known as: NovoLOG     isosorbide mononitrate 30 MG 24 hr tablet  Commonly known as: IMDUR     lactulose 10 gram/15 mL solution  Commonly known as: CHRONULAC     lisinopriL 20 MG tablet  Commonly known as: PRINIVIL,ZESTRIL     loperamide 2 mg capsule  Commonly known as: IMODIUM     metFORMIN 1000 MG tablet  Commonly known as: GLUCOPHAGE     sulfamethoxazole-trimethoprim 800-160mg 800-160 mg Tab  Commonly known as: BACTRIM DS               Where to Get Your Medications         Information about where to get these medications is not yet available    Ask your nurse or doctor about these medications  acetaminophen 650 MG Tbsr  cholestyramine-aspartame 4 gram Pwpk  furosemide 20 MG tablet  gabapentin 600 MG tablet  insulin glargine U-100 (Lantus) 100 unit/mL injection          Explained in detail to the patient about the discharge plan, medications, and follow-up visits. Pt understands and agrees with the treatment plan  Discharge Disposition: Skilled Nursing Facility   Discharged Condition: stable  Diet-   Dietary Orders (From admission, onward)       Start     Ordered    01/18/25 0256  Diet diabetic 2000 Calories (up to 75 gm per meal)  Diet effective now        Question:  Total calories / carbs:  Answer:  2000 Calories (up to 75 gm per meal)    01/18/25 0256                   Medications Per DC med rec  Activities as tolerated   Follow-up Information       Humphreys, Monica WEISS MD Follow up in 1 week(s).    Specialties: Internal Medicine, Urgent Care  Contact information:  [facility] [address]  [phone]                           For further questions contact hospitalist office    Discharge time 33 minutes    For worsening symptoms, chest pain, shortness of breath, increased abdominal pain, high grade fever, stroke or stroke like symptoms, immediately go to the nearest Emergency Room or call 911 as soon as possible.      Orquidea Mendez M.D, on 1/18/2025. at 11:32 AM.

## 2025-01-19 LAB
OHS QRS DURATION: 68 MS
OHS QTC CALCULATION: 430 MS

## 2025-01-22 ENCOUNTER — PATIENT OUTREACH (OUTPATIENT)
Dept: ADMINISTRATIVE | Facility: CLINIC | Age: 80
End: 2025-01-22
Payer: MEDICARE

## 2025-02-24 ENCOUNTER — OFFICE VISIT (OUTPATIENT)
Dept: ORTHOPEDICS | Facility: CLINIC | Age: 80
End: 2025-02-24
Payer: MEDICARE

## 2025-02-24 ENCOUNTER — HOSPITAL ENCOUNTER (OUTPATIENT)
Dept: RADIOLOGY | Facility: CLINIC | Age: 80
Discharge: HOME | End: 2025-02-24
Attending: ORTHOPAEDIC SURGERY
Payer: MEDICARE

## 2025-02-24 VITALS
DIASTOLIC BLOOD PRESSURE: 69 MMHG | SYSTOLIC BLOOD PRESSURE: 102 MMHG | WEIGHT: 171.94 LBS | HEIGHT: 59 IN | BODY MASS INDEX: 34.66 KG/M2 | HEART RATE: 74 BPM

## 2025-02-24 DIAGNOSIS — S72.492D OTHER CLOSED FRACTURE OF DISTAL END OF LEFT FEMUR WITH ROUTINE HEALING, SUBSEQUENT ENCOUNTER: ICD-10-CM

## 2025-02-24 DIAGNOSIS — S72.492D OTHER CLOSED FRACTURE OF DISTAL END OF LEFT FEMUR WITH ROUTINE HEALING, SUBSEQUENT ENCOUNTER: Primary | ICD-10-CM

## 2025-02-24 PROCEDURE — 73552 X-RAY EXAM OF FEMUR 2/>: CPT | Mod: LT,,, | Performed by: ORTHOPAEDIC SURGERY

## 2025-02-24 NOTE — PROGRESS NOTES
[facility]      Name: Gabriela Lozano  : 1945  MRN: [id number]  Date: 2025    Chief Complaint   Patient presents with    Left Femur - Follow-up     3 months f/u LT FEMUR IMN SX 24 GL 25, presents in wheelchair, currently in facility, states pain with weather change, has been walking in pt with walker, no other complaints        Subjective:      Chief Complaint   Patient presents with    Left Femur - Follow-up     3 months f/u LT FEMUR IMN SX 24 GL 25, presents in wheelchair, currently in facility, states pain with weather change, has been walking in pt with walker, no other complaints         Follow-up      Gabriela Lozano is a 79 y.o. female presents to clinic for 3 month follow up status post intramedullary nail of left femur fracture.  Her son is with her today.  Patient reports she feels well and has been working with physical therapy at the rehab facility.  Reports she is walking around the track with her walker.  Reports she is sore afterwards.  No pain with ambulation.  Presents in a wheelchair today.  No other complaints at this time.  She does want to know when she will be discharged from the rehabilitation facility.    ROS:  Constitutional: Denies fever chills  MSK: Pain evident at site of injury located in HPI,   Integ: No signs of abrasions or lacerations  Neuro: No numbness or tingling  Lymphatic: No swelling outside the area of injury     Current Outpatient Medications   Medication Instructions    acetaminophen (TYLENOL) 650 mg, Oral, Every 8 hours PRN    atorvastatin (LIPITOR) 40 mg, Daily    carvediloL (COREG) 6.25 mg, 2 times daily    cholestyramine-aspartame (CHOLESTYRAMINE LIGHT) 4 gram PwPk 4 grams of anhydrous cholestyramine, Oral, Daily PRN    clopidogreL (PLAVIX) 75 mg, Daily    furosemide (LASIX) 20 mg, Oral, Daily PRN    gabapentin (NEURONTIN) 300 mg, Oral, 3 times daily    insulin glargine U-100 (Lantus) 14 Units,  "Subcutaneous, Nightly    isosorbide dinitrate (ISORDIL) 30 mg, Daily    levothyroxine (SYNTHROID) 88 mcg, Oral, Before breakfast    mirtazapine (REMERON) 15 mg, Nightly    NOVOLIN R FLEXPEN 100 unit/mL (3 mL) InPn pen     oxyCODONE (ROXICODONE) 10 mg, Oral, Every 6 hours PRN        Objective:     Visit Vitals  /69   Pulse 74   Ht 4' 11" (1.499 m)   Wt 78 kg (171 lb 15.3 oz)   BMI 34.73 kg/m²     Physical Exam    General the patient is alert and oriented x3 no acute distress nontoxic-appearing appropriate affect.    Constitutional: Vital signs are examined and stable.  Resp: No signs of labored breathing                   Left lower extremity:           -MSK: Hip and Knee F/E, EHL/FHL, Gastroc/Tib anterior motor intact.  No painful or prominent hardware.  Tolerates passive range of motion of knee without discomfort with full extension.  Seated in wheelchair, knees and approximately 110° knee flexion passively.  No crepitus to knee with passive range of motion.           -Neuro:  Sensation intact to light touch L3-S1 dermatomes           -Lymphatic:  Nonpitting edema at foot           -CV:  Posterior tibialis pulse palpable.  Compartments soft and compressible        Body mass index is 34.73 kg/m².  Ideal body weight: 48.8 kg (107 lb 8.4 oz)  Adjusted ideal body weight: 60.5 kg (133 lb 4.8 oz)  Hgb   Date Value Ref Range Status   01/24/2025 9.7 (L) 12.0 - 16.0 g/dL Final   01/18/2025 10.2 (L) 12.0 - 16.0 g/dL Final     Hct   Date Value Ref Range Status   01/24/2025 30.6 (L) 37.0 - 47.0 % Final   01/18/2025 31.6 (L) 37.0 - 47.0 % Final     Vitamin D   Date Value Ref Range Status   12/06/2024 33 30 - 80 ng/mL Final     WBC   Date Value Ref Range Status   01/24/2025 6.64 4.50 - 11.50 x10(3)/mcL Final   01/18/2025 8.17 4.50 - 11.50 x10(3)/mcL Final   12/04/2024 9.92 x10(3)/mcL Final       Radiology:   X-ray left femur, two-view: Hardware intact without evidence of failure.  Fracture maintains alignment.  Evidence of " consolidation noted.     Assessment:       ICD-10-CM ICD-9-CM   1. Other closed fracture of distal end of left femur with routine healing, subsequent encounter  S72.492D V54.15       Plan:     1. Other closed fracture of distal end of left femur with routine healing, subsequent encounter  -     X-Ray Femur 2 View Left; Future; Expected date: 02/24/2025      Patient doing well today and continuing to work with physical therapy at the facility she is residing at.  She may continue to weightbear as tolerated and perform range of motion as tolerated.  Continue gait training and strengthening/stretching.  Return to clinic in 2 months for repeat evaluation and x-rays.  She is wanting to have the conversation about when she will be discharged from the rehabilitation center.  Discussed with the patient and her son that she will need to discuss this with the rehabilitation provider who provides discharge information.  If she is discharged in the near future, she will either need home health physical therapy or an outpatient physical therapy order.  Discussed she may contact our clinic for an outpatient physical therapy order if she is pursuing outpatient physical therapy once discharged from facility.  Patient and her son verbalized understanding and agree with treatment plan.  Pt to call clinic with any questions/concerns.      The above findings, diagnostics, and treatment plan were discussed with Dr. Teixeira who is in agreement with the plan of care except as stated in additional documentation.     Ashley Gunther, PA-C [facility]    Future Appointments   Date Time Provider Department Center   4/21/2025  9:00 AM Godwin Teixeira MD [facility] [place]       This note was created with the assistance of voice recognition software or phone dictation. There may be transcription errors as a result of using this technology however minimal. Effort has been made to assure accuracy of transcription  but any obvious errors or omissions should be clarified with the author of the document.

## 2025-04-21 ENCOUNTER — HOSPITAL ENCOUNTER (OUTPATIENT)
Dept: RADIOLOGY | Facility: CLINIC | Age: 80
Discharge: HOME | End: 2025-04-21
Attending: ORTHOPAEDIC SURGERY
Payer: MEDICARE

## 2025-04-21 ENCOUNTER — OFFICE VISIT (OUTPATIENT)
Dept: ORTHOPEDICS | Facility: CLINIC | Age: 80
End: 2025-04-21
Payer: MEDICARE

## 2025-04-21 VITALS
BODY MASS INDEX: 34.66 KG/M2 | SYSTOLIC BLOOD PRESSURE: 176 MMHG | WEIGHT: 171.94 LBS | HEIGHT: 59 IN | DIASTOLIC BLOOD PRESSURE: 72 MMHG | RESPIRATION RATE: 18 BRPM | HEART RATE: 90 BPM

## 2025-04-21 DIAGNOSIS — S72.492D OTHER CLOSED FRACTURE OF DISTAL END OF LEFT FEMUR WITH ROUTINE HEALING, SUBSEQUENT ENCOUNTER: ICD-10-CM

## 2025-04-21 DIAGNOSIS — S72.492K: Primary | ICD-10-CM

## 2025-04-21 PROCEDURE — 99024 POSTOP FOLLOW-UP VISIT: CPT | Mod: POP,,,

## 2025-04-21 PROCEDURE — 73552 X-RAY EXAM OF FEMUR 2/>: CPT | Mod: LT,,, | Performed by: ORTHOPAEDIC SURGERY

## 2025-04-21 NOTE — PROGRESS NOTES
[facility]      Name: Gabriela Loznao  : 1945  MRN: [id number]  Date: 2025    Chief Complaint   Patient presents with    Left Femur - Follow-up     5 month f/u imn left femur fx, ambulating with walker, reports improvement.        Subjective:      Chief Complaint   Patient presents with    Left Femur - Follow-up     5 month f/u imn left femur fx, ambulating with walker, reports improvement.         Follow-up      Gabriela Lozano is a 79 y.o. female presents to clinic for 5 month follow up status post intramedullary nail of left femur fracture.  Her son's friend is with her today.  Patient reports she feels well and has been working with physical therapy at the rehab facility.  Does have some pain at the fracture site intermittently.  Feels she is improving.  Ambulates with a walker.  No other complaints at this time.      ROS:  Constitutional: Denies fever chills  MSK: Pain evident at site of injury located in HPI,   Integ: No signs of abrasions or lacerations  Neuro: No numbness or tingling  Lymphatic: No swelling outside the area of injury     Current Outpatient Medications   Medication Instructions    acetaminophen (TYLENOL) 650 mg, Oral, Every 8 hours PRN    atorvastatin (LIPITOR) 40 mg, Daily    carvediloL (COREG) 6.25 mg, 2 times daily    cholestyramine-aspartame (CHOLESTYRAMINE LIGHT) 4 gram PwPk 4 grams of anhydrous cholestyramine, Oral, Daily PRN    clopidogreL (PLAVIX) 75 mg, Daily    furosemide (LASIX) 20 mg, Oral, Daily PRN    gabapentin (NEURONTIN) 300 mg, Oral, 3 times daily    insulin glargine U-100 (Lantus) 14 Units, Subcutaneous, Nightly    isosorbide dinitrate (ISORDIL) 30 mg, Daily    levothyroxine (SYNTHROID) 88 mcg, Oral, Before breakfast    mirtazapine (REMERON) 15 mg, Nightly    NOVOLIN R FLEXPEN 100 unit/mL (3 mL) InPn pen     oxyCODONE (ROXICODONE) 10 mg, Oral, Every 6 hours PRN        Objective:     Visit Vitals  BP (!) 176/72   Pulse 90  "  Resp 18   Ht 4' 11" (1.499 m)   Wt 78 kg (171 lb 15.3 oz)   BMI 34.73 kg/m²     Physical Exam    General the patient is alert and oriented x3 no acute distress nontoxic-appearing appropriate affect.    Constitutional: Vital signs are examined and stable.  Resp: No signs of labored breathing                   Left lower extremity:           -MSK: Hip and Knee F/E, EHL/FHL, Gastroc/Tib anterior motor intact.  No painful or prominent hardware.  Tolerates passive range of motion of knee without discomfort with full extension.  Knee ROM approximately 110° knee flexion passively.  No crepitus to knee with passive range of motion.           -Neuro:  Sensation intact to light touch L3-S1 dermatomes           -Lymphatic:  Nonpitting edema at foot           -CV:  Posterior tibialis pulse palpable.  Compartments soft and compressible        Body mass index is 34.73 kg/m².  Ideal body weight: 48.8 kg (107 lb 8.4 oz)  Adjusted ideal body weight: 60.5 kg (133 lb 4.8 oz)  Hgb   Date Value Ref Range Status   01/24/2025 9.7 (L) 12.0 - 16.0 g/dL Final   01/18/2025 10.2 (L) 12.0 - 16.0 g/dL Final     Hct   Date Value Ref Range Status   01/24/2025 30.6 (L) 37.0 - 47.0 % Final   01/18/2025 31.6 (L) 37.0 - 47.0 % Final     Vitamin D   Date Value Ref Range Status   12/06/2024 33 30 - 80 ng/mL Final     WBC   Date Value Ref Range Status   01/24/2025 6.64 4.50 - 11.50 x10(3)/mcL Final   01/18/2025 8.17 4.50 - 11.50 x10(3)/mcL Final   12/04/2024 9.92 x10(3)/mcL Final       Radiology:   X-ray left femur, two-view: Hardware intact without evidence of failure.  Fracture maintains alignment.  Evidence of consolidation noted although not completely united at this time.    Assessment:       ICD-10-CM ICD-9-CM   1. 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 Closed comminuted intra-articular fracture of distal femur, left, with nonunion, subsequent " encounter  -     X-Ray Femur 2 View Left; Future; Expected date: 04/21/2025  -     SUBSEQUENT HOME HEALTH ORDERS      Patient doing well but does have some pain at the fracture site intermittently.  Upon review of x-ray, she has developed a nonunion.  Patient is 5 months out from surgery.  She is skeletally mature with less than 1 cm fracture gap.  We will pursue treatment with bone stimulation device at this time.  Patient did have lab work in December of 2024 with a normal vitamin-D.  She currently takes a multivitamin and does not use nicotine products.  She is currently participating in home health physical therapy.  She will continue home health physical therapy and weight-bearing as tolerated.  We will have her follow up in 2-3 months for repeat evaluation and imaging.  At that time, if she has not had progress progression of her fracture on x-ray, we will consider nonunion workup.  Allowed time for questions.  Questions answered to pt satisfaction.  Pt verbalizes understanding and agrees with tx plan. Pt to call clinic with any questions/concerns.      Ashley Gunther, PA-C [facility]    No future appointments.      This note was created with the assistance of voice recognition software or phone dictation. There may be transcription errors as a result of using this technology however minimal. Effort has been made to assure accuracy of transcription but any obvious errors or omissions should be clarified with the author of the document.

## 2025-04-22 DIAGNOSIS — S72.492K: Primary | ICD-10-CM

## 2025-05-12 ENCOUNTER — HOSPITAL ENCOUNTER (INPATIENT)
Facility: HOSPITAL | Age: 80
LOS: 4 days | Discharge: NURSING, REHAB OR LONG-TERM CARE | DRG: 189 | End: 2025-05-16
Attending: EMERGENCY MEDICINE | Admitting: INTERNAL MEDICINE
Payer: MEDICARE

## 2025-05-12 DIAGNOSIS — I50.9 CHF (CONGESTIVE HEART FAILURE): ICD-10-CM

## 2025-05-12 DIAGNOSIS — R09.02 HYPOXIA: ICD-10-CM

## 2025-05-12 DIAGNOSIS — R06.02 SHORTNESS OF BREATH: ICD-10-CM

## 2025-05-12 DIAGNOSIS — R07.9 CHEST PAIN: ICD-10-CM

## 2025-05-12 DIAGNOSIS — R07.9 CHEST PAIN IN ADULT: ICD-10-CM

## 2025-05-12 DIAGNOSIS — R52 PAIN: ICD-10-CM

## 2025-05-12 DIAGNOSIS — N18.9 CHRONIC RENAL IMPAIRMENT, UNSPECIFIED CKD STAGE: ICD-10-CM

## 2025-05-12 DIAGNOSIS — R09.89 PULMONARY VASCULAR CONGESTION: Primary | ICD-10-CM

## 2025-05-12 DIAGNOSIS — D64.9 CHRONIC ANEMIA: ICD-10-CM

## 2025-05-12 DIAGNOSIS — J81.1 PULMONARY EDEMA: ICD-10-CM

## 2025-05-12 PROBLEM — R07.2 PRECORDIAL PAIN: Status: ACTIVE | Noted: 2025-05-12

## 2025-05-12 PROBLEM — J90 PLEURAL EFFUSION: Status: ACTIVE | Noted: 2025-05-12

## 2025-05-12 LAB
ALBUMIN SERPL-MCNC: 3.5 G/DL (ref 3.4–4.8)
ALBUMIN/GLOB SERPL: 1 RATIO (ref 1.1–2)
ALP SERPL-CCNC: 77 UNIT/L (ref 40–150)
ALT SERPL-CCNC: 8 UNIT/L (ref 0–55)
ANION GAP SERPL CALC-SCNC: 11 MEQ/L
APTT PPP: 35.9 SECONDS (ref 23.4–33.9)
AST SERPL-CCNC: 16 UNIT/L (ref 11–45)
BASOPHILS # BLD AUTO: 0.02 X10(3)/MCL
BASOPHILS NFR BLD AUTO: 0.3 %
BILIRUB SERPL-MCNC: 0.5 MG/DL
BNP BLD-MCNC: 321.6 PG/ML
BUN SERPL-MCNC: 22.2 MG/DL (ref 9.8–20.1)
CALCIUM SERPL-MCNC: 9.5 MG/DL (ref 8.4–10.2)
CHLORIDE SERPL-SCNC: 109 MMOL/L (ref 98–107)
CO2 SERPL-SCNC: 23 MMOL/L (ref 23–31)
CREAT SERPL-MCNC: 1.3 MG/DL (ref 0.55–1.02)
CREAT/UREA NIT SERPL: 17
D DIMER PPP IA.FEU-MCNC: 2.52 UG/ML FEU (ref 0–0.5)
EOSINOPHIL # BLD AUTO: 0.2 X10(3)/MCL (ref 0–0.9)
EOSINOPHIL NFR BLD AUTO: 2.7 %
ERYTHROCYTE [DISTWIDTH] IN BLOOD BY AUTOMATED COUNT: 13.4 % (ref 11.5–17)
EST. AVERAGE GLUCOSE BLD GHB EST-MCNC: 108.3 MG/DL
FLUAV AG UPPER RESP QL IA.RAPID: NOT DETECTED
FLUBV AG UPPER RESP QL IA.RAPID: NOT DETECTED
GFR SERPLBLD CREATININE-BSD FMLA CKD-EPI: 42 ML/MIN/1.73/M2
GLOBULIN SER-MCNC: 3.4 GM/DL (ref 2.4–3.5)
GLUCOSE SERPL-MCNC: 173 MG/DL (ref 82–115)
HBA1C MFR BLD: 5.4 %
HCT VFR BLD AUTO: 29.5 % (ref 37–47)
HGB BLD-MCNC: 9.6 G/DL (ref 12–16)
IMM GRANULOCYTES # BLD AUTO: 0.03 X10(3)/MCL (ref 0–0.04)
IMM GRANULOCYTES NFR BLD AUTO: 0.4 %
INR PPP: 1 (ref 2–3)
LYMPHOCYTES # BLD AUTO: 1.21 X10(3)/MCL (ref 0.6–4.6)
LYMPHOCYTES NFR BLD AUTO: 16.1 %
MCH RBC QN AUTO: 30.6 PG (ref 27–31)
MCHC RBC AUTO-ENTMCNC: 32.5 G/DL (ref 33–36)
MCV RBC AUTO: 93.9 FL (ref 80–94)
MONOCYTES # BLD AUTO: 0.52 X10(3)/MCL (ref 0.1–1.3)
MONOCYTES NFR BLD AUTO: 6.9 %
NEUTROPHILS # BLD AUTO: 5.52 X10(3)/MCL (ref 2.1–9.2)
NEUTROPHILS NFR BLD AUTO: 73.6 %
NRBC BLD AUTO-RTO: 0 %
OHS QRS DURATION: 72 MS
OHS QTC CALCULATION: 413 MS
PLATELET # BLD AUTO: 256 X10(3)/MCL (ref 130–400)
PMV BLD AUTO: 9.8 FL (ref 7.4–10.4)
POTASSIUM SERPL-SCNC: 4.3 MMOL/L (ref 3.5–5.1)
PROT SERPL-MCNC: 6.9 GM/DL (ref 5.8–7.6)
PROTHROMBIN TIME: 13.8 SECONDS (ref 11.7–14.5)
RBC # BLD AUTO: 3.14 X10(6)/MCL (ref 4.2–5.4)
RSV A 5' UTR RNA NPH QL NAA+PROBE: NOT DETECTED
SARS-COV-2 RNA RESP QL NAA+PROBE: NOT DETECTED
SODIUM SERPL-SCNC: 143 MMOL/L (ref 136–145)
TROPONIN I SERPL-MCNC: 0.02 NG/ML (ref 0–0.04)
TROPONIN I SERPL-MCNC: 0.03 NG/ML (ref 0–0.04)
TSH SERPL-ACNC: 2.54 UIU/ML (ref 0.35–4.94)
WBC # BLD AUTO: 7.5 X10(3)/MCL (ref 4.5–11.5)

## 2025-05-12 PROCEDURE — 25000003 PHARM REV CODE 250: Performed by: EMERGENCY MEDICINE

## 2025-05-12 PROCEDURE — 85025 COMPLETE CBC W/AUTO DIFF WBC: CPT | Performed by: EMERGENCY MEDICINE

## 2025-05-12 PROCEDURE — 83036 HEMOGLOBIN GLYCOSYLATED A1C: CPT | Performed by: EMERGENCY MEDICINE

## 2025-05-12 PROCEDURE — 63600175 PHARM REV CODE 636 W HCPCS: Performed by: EMERGENCY MEDICINE

## 2025-05-12 PROCEDURE — 63600175 PHARM REV CODE 636 W HCPCS: Performed by: INTERNAL MEDICINE

## 2025-05-12 PROCEDURE — 25500020 PHARM REV CODE 255: Performed by: EMERGENCY MEDICINE

## 2025-05-12 PROCEDURE — 11000001 HC ACUTE MED/SURG PRIVATE ROOM

## 2025-05-12 PROCEDURE — 99900031 HC PATIENT EDUCATION (STAT)

## 2025-05-12 PROCEDURE — 96365 THER/PROPH/DIAG IV INF INIT: CPT

## 2025-05-12 PROCEDURE — 99285 EMERGENCY DEPT VISIT HI MDM: CPT | Mod: 25

## 2025-05-12 PROCEDURE — 0241U COVID/RSV/FLU A&B PCR: CPT | Performed by: EMERGENCY MEDICINE

## 2025-05-12 PROCEDURE — 27000221 HC OXYGEN, UP TO 24 HOURS

## 2025-05-12 PROCEDURE — 83880 ASSAY OF NATRIURETIC PEPTIDE: CPT | Performed by: EMERGENCY MEDICINE

## 2025-05-12 PROCEDURE — 25000242 PHARM REV CODE 250 ALT 637 W/ HCPCS: Performed by: EMERGENCY MEDICINE

## 2025-05-12 PROCEDURE — 93005 ELECTROCARDIOGRAM TRACING: CPT

## 2025-05-12 PROCEDURE — 85379 FIBRIN DEGRADATION QUANT: CPT | Performed by: EMERGENCY MEDICINE

## 2025-05-12 PROCEDURE — 94640 AIRWAY INHALATION TREATMENT: CPT

## 2025-05-12 PROCEDURE — 84443 ASSAY THYROID STIM HORMONE: CPT | Performed by: EMERGENCY MEDICINE

## 2025-05-12 PROCEDURE — 21400001 HC TELEMETRY ROOM

## 2025-05-12 PROCEDURE — 85730 THROMBOPLASTIN TIME PARTIAL: CPT | Performed by: EMERGENCY MEDICINE

## 2025-05-12 PROCEDURE — 84484 ASSAY OF TROPONIN QUANT: CPT | Performed by: EMERGENCY MEDICINE

## 2025-05-12 PROCEDURE — 93010 ELECTROCARDIOGRAM REPORT: CPT | Mod: ,,, | Performed by: INTERNAL MEDICINE

## 2025-05-12 PROCEDURE — 85610 PROTHROMBIN TIME: CPT | Performed by: EMERGENCY MEDICINE

## 2025-05-12 PROCEDURE — 25000003 PHARM REV CODE 250: Performed by: INTERNAL MEDICINE

## 2025-05-12 PROCEDURE — 80053 COMPREHEN METABOLIC PANEL: CPT | Performed by: EMERGENCY MEDICINE

## 2025-05-12 PROCEDURE — 96375 TX/PRO/DX INJ NEW DRUG ADDON: CPT

## 2025-05-12 RX ORDER — SODIUM CHLORIDE 0.9 % (FLUSH) 0.9 %
10 SYRINGE (ML) INJECTION
Status: DISCONTINUED | OUTPATIENT
Start: 2025-05-12 | End: 2025-05-16 | Stop reason: HOSPADM

## 2025-05-12 RX ORDER — AMOXICILLIN 250 MG
1 CAPSULE ORAL 2 TIMES DAILY
Status: DISCONTINUED | OUTPATIENT
Start: 2025-05-12 | End: 2025-05-16 | Stop reason: HOSPADM

## 2025-05-12 RX ORDER — GLUCAGON 1 MG
1 KIT INJECTION
Status: DISCONTINUED | OUTPATIENT
Start: 2025-05-12 | End: 2025-05-16 | Stop reason: HOSPADM

## 2025-05-12 RX ORDER — MIRTAZAPINE 15 MG/1
15 TABLET, FILM COATED ORAL NIGHTLY
Status: DISCONTINUED | OUTPATIENT
Start: 2025-05-12 | End: 2025-05-16 | Stop reason: HOSPADM

## 2025-05-12 RX ORDER — ALBUTEROL SULFATE 0.83 MG/ML
2.5 SOLUTION RESPIRATORY (INHALATION)
Status: COMPLETED | OUTPATIENT
Start: 2025-05-12 | End: 2025-05-12

## 2025-05-12 RX ORDER — IBUPROFEN 200 MG
16 TABLET ORAL
Status: DISCONTINUED | OUTPATIENT
Start: 2025-05-12 | End: 2025-05-16 | Stop reason: HOSPADM

## 2025-05-12 RX ORDER — CARVEDILOL 6.25 MG/1
6.25 TABLET ORAL 2 TIMES DAILY
Status: DISCONTINUED | OUTPATIENT
Start: 2025-05-12 | End: 2025-05-12

## 2025-05-12 RX ORDER — ONDANSETRON HYDROCHLORIDE 2 MG/ML
INJECTION, SOLUTION INTRAVENOUS
Status: DISPENSED
Start: 2025-05-12 | End: 2025-05-12

## 2025-05-12 RX ORDER — ACETAMINOPHEN 325 MG/1
650 TABLET ORAL
Status: COMPLETED | OUTPATIENT
Start: 2025-05-12 | End: 2025-05-12

## 2025-05-12 RX ORDER — ONDANSETRON HYDROCHLORIDE 2 MG/ML
4 INJECTION, SOLUTION INTRAVENOUS EVERY 8 HOURS PRN
Status: DISCONTINUED | OUTPATIENT
Start: 2025-05-12 | End: 2025-05-16 | Stop reason: HOSPADM

## 2025-05-12 RX ORDER — NITROGLYCERIN 20 MG/G
0.5 OINTMENT TOPICAL
Status: COMPLETED | OUTPATIENT
Start: 2025-05-12 | End: 2025-05-12

## 2025-05-12 RX ORDER — LISINOPRIL 20 MG/1
TABLET ORAL
Status: ON HOLD | COMMUNITY
Start: 2024-12-06 | End: 2025-05-16

## 2025-05-12 RX ORDER — ISOSORBIDE MONONITRATE 30 MG/1
30 TABLET, EXTENDED RELEASE ORAL EVERY MORNING
Status: DISCONTINUED | OUTPATIENT
Start: 2025-05-13 | End: 2025-05-16 | Stop reason: HOSPADM

## 2025-05-12 RX ORDER — TRAMADOL HYDROCHLORIDE 50 MG/1
50 TABLET, FILM COATED ORAL EVERY 6 HOURS PRN
Status: DISCONTINUED | OUTPATIENT
Start: 2025-05-12 | End: 2025-05-16 | Stop reason: HOSPADM

## 2025-05-12 RX ORDER — LISINOPRIL 20 MG/1
20 TABLET ORAL DAILY
Status: DISCONTINUED | OUTPATIENT
Start: 2025-05-13 | End: 2025-05-16 | Stop reason: HOSPADM

## 2025-05-12 RX ORDER — CLOPIDOGREL BISULFATE 75 MG/1
75 TABLET ORAL DAILY
Status: DISCONTINUED | OUTPATIENT
Start: 2025-05-13 | End: 2025-05-16 | Stop reason: HOSPADM

## 2025-05-12 RX ORDER — MAGNESIUM SULFATE 1 G/100ML
1 INJECTION INTRAVENOUS ONCE
Status: COMPLETED | OUTPATIENT
Start: 2025-05-12 | End: 2025-05-12

## 2025-05-12 RX ORDER — METFORMIN HYDROCHLORIDE 1000 MG/1
1000 TABLET ORAL 2 TIMES DAILY
Status: ON HOLD | COMMUNITY
End: 2025-05-16 | Stop reason: HOSPADM

## 2025-05-12 RX ORDER — ATORVASTATIN CALCIUM 40 MG/1
40 TABLET, FILM COATED ORAL DAILY
Status: DISCONTINUED | OUTPATIENT
Start: 2025-05-13 | End: 2025-05-16 | Stop reason: HOSPADM

## 2025-05-12 RX ORDER — TALC
6 POWDER (GRAM) TOPICAL NIGHTLY PRN
Status: DISCONTINUED | OUTPATIENT
Start: 2025-05-12 | End: 2025-05-16 | Stop reason: HOSPADM

## 2025-05-12 RX ORDER — ACETAMINOPHEN 325 MG/1
650 TABLET ORAL EVERY 8 HOURS PRN
Status: DISCONTINUED | OUTPATIENT
Start: 2025-05-12 | End: 2025-05-16 | Stop reason: HOSPADM

## 2025-05-12 RX ORDER — LABETALOL HYDROCHLORIDE 5 MG/ML
10 INJECTION, SOLUTION INTRAVENOUS EVERY 4 HOURS PRN
Status: DISCONTINUED | OUTPATIENT
Start: 2025-05-12 | End: 2025-05-16 | Stop reason: HOSPADM

## 2025-05-12 RX ORDER — FUROSEMIDE 10 MG/ML
20 INJECTION INTRAMUSCULAR; INTRAVENOUS
Status: COMPLETED | OUTPATIENT
Start: 2025-05-12 | End: 2025-05-12

## 2025-05-12 RX ORDER — HYDRALAZINE HYDROCHLORIDE 20 MG/ML
10 INJECTION INTRAMUSCULAR; INTRAVENOUS
Status: COMPLETED | OUTPATIENT
Start: 2025-05-12 | End: 2025-05-12

## 2025-05-12 RX ORDER — LEVOTHYROXINE SODIUM 100 UG/1
TABLET ORAL
COMMUNITY
Start: 2024-10-01

## 2025-05-12 RX ORDER — TRAMADOL HYDROCHLORIDE 50 MG/1
TABLET, FILM COATED ORAL
Status: ON HOLD | COMMUNITY
Start: 2025-05-01 | End: 2025-05-16 | Stop reason: HOSPADM

## 2025-05-12 RX ORDER — LEVOTHYROXINE SODIUM 100 UG/1
100 TABLET ORAL
Status: DISCONTINUED | OUTPATIENT
Start: 2025-05-13 | End: 2025-05-16 | Stop reason: HOSPADM

## 2025-05-12 RX ORDER — METHYLPREDNISOLONE SOD SUCC 125 MG
125 VIAL (EA) INJECTION
Status: COMPLETED | OUTPATIENT
Start: 2025-05-12 | End: 2025-05-12

## 2025-05-12 RX ORDER — ALBUTEROL SULFATE 0.83 MG/ML
2.5 SOLUTION RESPIRATORY (INHALATION) EVERY 8 HOURS
Status: DISCONTINUED | OUTPATIENT
Start: 2025-05-12 | End: 2025-05-12

## 2025-05-12 RX ORDER — INSULIN ASPART 100 [IU]/ML
0-5 INJECTION, SOLUTION INTRAVENOUS; SUBCUTANEOUS
Status: DISCONTINUED | OUTPATIENT
Start: 2025-05-12 | End: 2025-05-16 | Stop reason: HOSPADM

## 2025-05-12 RX ORDER — ISOSORBIDE MONONITRATE 30 MG/1
30 TABLET, EXTENDED RELEASE ORAL EVERY MORNING
COMMUNITY
Start: 2025-03-16

## 2025-05-12 RX ORDER — NITROGLYCERIN 20 MG/G
0.5 OINTMENT TOPICAL EVERY 6 HOURS
Status: DISCONTINUED | OUTPATIENT
Start: 2025-05-12 | End: 2025-05-15

## 2025-05-12 RX ORDER — ALBUTEROL SULFATE 0.83 MG/ML
2.5 SOLUTION RESPIRATORY (INHALATION) EVERY 4 HOURS PRN
Status: DISCONTINUED | OUTPATIENT
Start: 2025-05-12 | End: 2025-05-16 | Stop reason: HOSPADM

## 2025-05-12 RX ORDER — NITROGLYCERIN 0.4 MG/1
0.4 TABLET SUBLINGUAL EVERY 5 MIN PRN
Status: DISCONTINUED | OUTPATIENT
Start: 2025-05-12 | End: 2025-05-16 | Stop reason: HOSPADM

## 2025-05-12 RX ORDER — POLYETHYLENE GLYCOL 3350 17 G/17G
17 POWDER, FOR SOLUTION ORAL DAILY
Status: DISCONTINUED | OUTPATIENT
Start: 2025-05-13 | End: 2025-05-16 | Stop reason: HOSPADM

## 2025-05-12 RX ORDER — INSULIN GLARGINE 100 [IU]/ML
14 INJECTION, SOLUTION SUBCUTANEOUS DAILY
Status: DISCONTINUED | OUTPATIENT
Start: 2025-05-13 | End: 2025-05-16 | Stop reason: HOSPADM

## 2025-05-12 RX ORDER — IBUPROFEN 200 MG
24 TABLET ORAL
Status: DISCONTINUED | OUTPATIENT
Start: 2025-05-12 | End: 2025-05-16 | Stop reason: HOSPADM

## 2025-05-12 RX ORDER — FUROSEMIDE 10 MG/ML
40 INJECTION INTRAMUSCULAR; INTRAVENOUS EVERY 12 HOURS
Status: DISCONTINUED | OUTPATIENT
Start: 2025-05-12 | End: 2025-05-14

## 2025-05-12 RX ORDER — CARVEDILOL 12.5 MG/1
12.5 TABLET ORAL 2 TIMES DAILY
Status: DISCONTINUED | OUTPATIENT
Start: 2025-05-12 | End: 2025-05-16 | Stop reason: HOSPADM

## 2025-05-12 RX ADMIN — FUROSEMIDE 20 MG: 10 INJECTION, SOLUTION INTRAVENOUS at 02:05

## 2025-05-12 RX ADMIN — HYDRALAZINE HYDROCHLORIDE 10 MG: 20 INJECTION INTRAMUSCULAR; INTRAVENOUS at 12:05

## 2025-05-12 RX ADMIN — NITROGLYCERIN 0.5 INCH: 20 OINTMENT TOPICAL at 06:05

## 2025-05-12 RX ADMIN — SENNOSIDES AND DOCUSATE SODIUM 1 TABLET: 8.6; 5 TABLET ORAL at 09:05

## 2025-05-12 RX ADMIN — ALBUTEROL SULFATE 2.5 MG: 2.5 SOLUTION RESPIRATORY (INHALATION) at 09:05

## 2025-05-12 RX ADMIN — ALBUTEROL SULFATE 2.5 MG: 2.5 SOLUTION RESPIRATORY (INHALATION) at 04:05

## 2025-05-12 RX ADMIN — CARVEDILOL 12.5 MG: 12.5 TABLET, FILM COATED ORAL at 09:05

## 2025-05-12 RX ADMIN — NITROGLYCERIN 0.5 INCH: 20 OINTMENT TOPICAL at 11:05

## 2025-05-12 RX ADMIN — NITROGLYCERIN 0.5 INCH: 20 OINTMENT TOPICAL at 12:05

## 2025-05-12 RX ADMIN — IOHEXOL 100 ML: 350 INJECTION, SOLUTION INTRAVENOUS at 01:05

## 2025-05-12 RX ADMIN — FUROSEMIDE 20 MG: 10 INJECTION, SOLUTION INTRAVENOUS at 11:05

## 2025-05-12 RX ADMIN — ACETAMINOPHEN 650 MG: 325 TABLET ORAL at 11:05

## 2025-05-12 RX ADMIN — FUROSEMIDE 40 MG: 10 INJECTION, SOLUTION INTRAVENOUS at 09:05

## 2025-05-12 RX ADMIN — MAGNESIUM SULFATE IN DEXTROSE 1 G: 10 INJECTION, SOLUTION INTRAVENOUS at 11:05

## 2025-05-12 RX ADMIN — METHYLPREDNISOLONE SODIUM SUCCINATE 125 MG: 125 INJECTION, POWDER, FOR SOLUTION INTRAMUSCULAR; INTRAVENOUS at 11:05

## 2025-05-12 NOTE — H&P
[facility] EMERGENCY DEPARTMENT    Hospital Medicine History & Physical Examination       Patient Name: Gabriela Lozano  MRN: [id number]  Patient Class: IP- Inpatient   Admission Date: 5/12/2025   Admitting Physician: ALLIE Service   Length of Stay: 0  Attending Physician: Anil Crawley MD  Primary Care Provider: Petr, Monica WEISS MD (Inactive)  Face-to-Face encounter date: 05/12/2025    Code Status: Full Code    Chief Complaint: Cough (Per son, pt c/o cough and sob onset one week ago. States sob worsens with exertion. Pt also c/o pressure to chest and headache.)          HISTORY OF PRESENT ILLNESS:   Gabriela Lozano is a 79 y.o. female who  has a past medical history of CAD (coronary artery disease), Diabetes mellitus, Hypertension, and Hypothyroidism, unspecified. The patient presented to [facility] on 5/12/2025 with a primary complaint of dyspnea and chest pain.  The patient has had worsening dyspnea on exertion for the last week. She was recetnly started on a diuretic but only for about 1 week. She's not sure why it was stopped by her physician. She report episodes on chest tightness. She had an episode of severe sternal tightness last week that only last a few seconds. She had another episode today that wasn't as severe so she came to the ER for evaluation. In addition to ESPINOZA with minimal activity, she has orthopnea, weakness, occasional nausea with exertion. She was at rehab earlier this year an she was able to participate full w/o any of these symptoms. She's had a cardiac stent placed many years ago and a repeat angiogram was done about 3 years ago with 3 more stents placed. Pt reports she has been compliant with all her bp meds and plavix. She's had excessive bleeding after procedures done while on plavix. She also has an occasional dry cough. Her room air O2 sat was 84%. Her bp has been elevated in the /85, 210/116. She says it is normall well controlled in the  130s. The patient is a Egyptian speaker. Her son was at the bedside to help translate.    PAST MEDICAL HISTORY:     Past Medical History:   Diagnosis Date    CAD (coronary artery disease)     Diabetes mellitus     Hypertension     Hypothyroidism, unspecified        PAST SURGICAL HISTORY:     Past Surgical History:   Procedure Laterality Date     SECTION      CORONARY ANGIOPLASTY WITH STENT PLACEMENT      HYSTERECTOMY      RETROGRADE INTRAMEDULLARY RODDING OF DISTAL FEMUR Left 2024    Procedure: INSERTION, INTRAMEDULLARY MARCOS, FEMUR, DISTAL, RETROGRADE;  Surgeon: Godwin Teixeira MD;  Location: [facility];  Service: Orthopedics;  Laterality: Left;       ALLERGIES:   Patient has no known allergies.    FAMILY HISTORY:   family history includes No Known Problems in her brother, father, mother, and sister.    SOCIAL HISTORY:     Social History     Tobacco Use    Smoking status: Never    Smokeless tobacco: Never   Substance Use Topics    Alcohol use: Not Currently        Social Determinants of Health  Patient Screened for food insecurity, housing instability, transportation needs, utility difficulties, and interpersonal safety. Case management consulted for any needs identified.    HOME MEDICATIONS:     Prior to Admission medications    Medication Sig Start Date End Date Taking? Authorizing Provider   atorvastatin (LIPITOR) 40 MG tablet Take 40 mg by mouth once daily.   Yes Provider, Historical   carvediloL (COREG) 3.125 MG tablet Take 6.25 mg by mouth 2 (two) times daily. 7/15/24  Yes Provider, Historical   clopidogreL (PLAVIX) 75 mg tablet Take 75 mg by mouth once daily.   Yes Provider, Historical   gabapentin (NEURONTIN) 600 MG tablet Take 0.5 tablets (300 mg total) by mouth 3 (three) times daily. 25  Yes Orquidea Reese MD   isosorbide mononitrate (IMDUR) 30 MG 24 hr tablet Take 30 mg by mouth every morning. 3/16/25  Yes Provider, Historical   levothyroxine (SYNTHROID) 100 MCG tablet 1 tablet in the  morning on an empty stomach Orally Once a day for 100 days 10/1/24  Yes Provider, Historical   lisinopriL (PRINIVIL,ZESTRIL) 20 MG tablet 1 tablet Orally Once a day for 100 days 12/6/24  Yes Provider, Historical   metFORMIN (GLUCOPHAGE) 1000 MG tablet Take 1,000 mg by mouth 2 (two) times daily.   Yes Provider, Historical   traMADoL (ULTRAM) 50 mg tablet 1 tablet as needed Orally Once a day for 14 days  As needed 5/1/25  Yes Provider, Historical   isosorbide dinitrate (ISORDIL) 30 MG Tab Take 30 mg by mouth once daily.  5/12/25 Yes Provider, Historical   acetaminophen (TYLENOL) 650 MG TbSR Take 1 tablet (650 mg total) by mouth every 8 (eight) hours as needed (Pain). 1/18/25   Orquidea Reese MD   cholestyramine-aspartame (CHOLESTYRAMINE LIGHT) 4 gram PwPk Take 1 packet (4 grams of anhydrous cholestyramine total) by mouth daily as needed (Diarrhea). 1/18/25   Orquidea Reese MD   furosemide (LASIX) 20 MG tablet Take 1 tablet (20 mg total) by mouth daily as needed (In case of swelling, weight gain more than 2 lb overnight). 1/18/25   Orquidea Reese MD   insulin glargine U-100, Lantus, 100 unit/mL injection Inject 14 Units into the skin every evening. 1/18/25 2/24/25  Orquidea Reese MD   mirtazapine (REMERON) 15 MG tablet Take 15 mg by mouth every evening. 12/18/24   Provider, Historical   NOVOLIN R FLEXPEN 100 unit/mL (3 mL) InPn pen  12/9/24   Provider, Historical   oxyCODONE (ROXICODONE) 10 mg Tab immediate release tablet Take 1 tablet (10 mg total) by mouth every 6 (six) hours as needed for Pain.  Patient not taking: Reported on 4/21/2025 12/5/24   Ankita Call FNP       REVIEW OF SYSTEMS:   Except as documented, all other systems reviewed and negative   Review of Systems   Constitutional:  Positive for malaise/fatigue. Negative for chills, diaphoresis and fever.   Respiratory:  Positive for cough and shortness of breath. Negative for sputum production and wheezing.    Cardiovascular:  Positive for chest  pain, orthopnea, leg swelling and PND. Negative for palpitations.   Gastrointestinal:  Positive for nausea. Negative for abdominal pain and vomiting.   Neurological:  Positive for dizziness and weakness.         PHYSICAL EXAM:     VITAL SIGNS: 24 HRS MIN & MAX LAST   Temp  Min: 97.9 °F (36.6 °C)  Max: 97.9 °F (36.6 °C) 97.9 °F (36.6 °C)   BP  Min: 141/104  Max: 228/85 (!) 148/81   Pulse  Min: 61  Max: 93  93   Resp  Min: 18  Max: 33 18   SpO2  Min: 94 %  Max: 100 % 99 %       General appearance: Well-developed, well-nourished female in no apparent distress.  HENT: Atraumatic head. Moist mucous membranes of oral cavity.  Eyes: Normal extraocular movements.   Neck: Supple.   Lungs: Clear to auscultation bilaterally. No wheezing or rhonchi at the time of my eval. Wearing NC.  Heart: Regular rate and rhythm. S1 and S2 present with no murmurs/gallop/rub. 1+ b/l LE edema. No JVD present.   Abdomen: Soft, non-distended, non-tender. No rebound tenderness/guarding. Bowel sounds are normal.   Extremities: No cyanosis, clubbing, or edema.  Skin: No Rash.   Neuro: Motor and sensory exams grossly intact. Good tone.   Psych/mental status: Appropriate mood and affect. Responds appropriately to questions.     LABS AND IMAGING:     Recent Labs   Lab 05/12/25  1030   WBC 7.50   RBC 3.14*   HGB 9.6*   HCT 29.5*   MCV 93.9   MCH 30.6   MCHC 32.5*   RDW 13.4      MPV 9.8       Recent Labs   Lab 05/12/25  1030      K 4.3   *   CO2 23   BUN 22.2*   CREATININE 1.30*   *   CALCIUM 9.5   ALBUMIN 3.5   PROT 6.9   ALKPHOS 77   ALT 8   AST 16   BILITOT 0.5       Microbiology Results (last 7 days)       ** No results found for the last 168 hours. **             CTA Chest Non-Coronary (PE Studies)  Narrative: EXAMINATION:  CTA CHEST NON CORONARY (PE STUDIES)    TECHNIQUE:  Low dose axial images, sagittal and coronal reformations were obtained from the thoracic inlet to the lung bases following the IV administration of  contrast..  Contrast timing was optimized to evaluate the pulmonary arteries.  MIP images were performed.  Automated exposure control was utilized    COMPARISON:  None    FINDINGS:  There is bibasilar atelectasis and bilateral pleural effusions.  Pleural effusion on the right side is larger than on the left side is moderate.  Some ground-glass pulmonary edema seen in the lungs bilaterally..  No mass is seen.  No pneumothorax is seen.    No pulmonary embolism is seen.    The thoracic aorta appears normal.  No mediastinal lymphadenopathy is seen.  The heart appears normal.    Upper abdomen shows no acute abnormality.  Impression: No pulmonary embolism seen    Bibasilar atelectasis and bilateral pleural effusions    Some pulmonary edema seen bilaterally    Electronically signed by: Bryson Powers  Date:    05/12/2025  Time:    13:56  X-Ray Chest 1 View  Narrative: EXAMINATION:  XR CHEST 1 VIEW    CPT 97364    CLINICAL HISTORY:  Shortness of breath    COMPARISON:  November 26, 2024    FINDINGS:  Examination reveals mediastinal silhouette to be within normal limits cardiac silhouette is not enlarged there is slight increase in interstitial and pulmonary vascular markings mild degree of congestion cannot be completely excluded.    There is blunting of both costophrenic angles which may indicate the presence of small pleural effusions.    No focal consolidative changes  Impression: Increase interstitial and pulmonary vascular markings may indicate a mild degree of congestion.    Small pleural effusions    Electronically signed by: Frederic Dobbins  Date:    05/12/2025  Time:    10:16      Recent Results (from the past 24 hours)   EKG 12-lead    Collection Time: 05/12/25 10:04 AM   Result Value Ref Range    QRS Duration 72 ms    [facility] QTC Calculation 413 ms   Brain natriuretic peptide    Collection Time: 05/12/25 10:30 AM   Result Value Ref Range    Natriuretic Peptide 321.6 (H) <=100.0 pg/mL   Comprehensive metabolic panel     Collection Time: 05/12/25 10:30 AM   Result Value Ref Range    Sodium 143 136 - 145 mmol/L    Potassium 4.3 3.5 - 5.1 mmol/L    Chloride 109 (H) 98 - 107 mmol/L    CO2 23 23 - 31 mmol/L    Glucose 173 (H) 82 - 115 mg/dL    Blood Urea Nitrogen 22.2 (H) 9.8 - 20.1 mg/dL    Creatinine 1.30 (H) 0.55 - 1.02 mg/dL    Calcium 9.5 8.4 - 10.2 mg/dL    Protein Total 6.9 5.8 - 7.6 gm/dL    Albumin 3.5 3.4 - 4.8 g/dL    Globulin 3.4 2.4 - 3.5 gm/dL    Albumin/Globulin Ratio 1.0 (L) 1.1 - 2.0 ratio    Bilirubin Total 0.5 <=1.5 mg/dL    ALP 77 40 - 150 unit/L    ALT 8 0 - 55 unit/L    AST 16 11 - 45 unit/L    eGFR 42 mL/min/1.73/m2    Anion Gap 11.0 mEq/L    BUN/Creatinine Ratio 17    COVID/RSV/FLU A&B PCR    Collection Time: 05/12/25 10:30 AM   Result Value Ref Range    Influenza A PCR Not Detected Not Detected    Influenza B PCR Not Detected Not Detected    Respiratory Syncytial Virus PCR Not Detected Not Detected    SARS-CoV-2 PCR Not Detected Not Detected, Negative   D dimer, quantitative    Collection Time: 05/12/25 10:30 AM   Result Value Ref Range    D-Dimer 2.52 (H) 0.00 - 0.50 ug/mL FEU   Troponin I    Collection Time: 05/12/25 10:30 AM   Result Value Ref Range    Troponin-I 0.027 0.000 - 0.045 ng/mL   CBC with Differential    Collection Time: 05/12/25 10:30 AM   Result Value Ref Range    WBC 7.50 4.50 - 11.50 x10(3)/mcL    RBC 3.14 (L) 4.20 - 5.40 x10(6)/mcL    Hgb 9.6 (L) 12.0 - 16.0 g/dL    Hct 29.5 (L) 37.0 - 47.0 %    MCV 93.9 80.0 - 94.0 fL    MCH 30.6 27.0 - 31.0 pg    MCHC 32.5 (L) 33.0 - 36.0 g/dL    RDW 13.4 11.5 - 17.0 %    Platelet 256 130 - 400 x10(3)/mcL    MPV 9.8 7.4 - 10.4 fL    Neut % 73.6 %    Lymph % 16.1 %    Mono % 6.9 %    Eos % 2.7 %    Basophil % 0.3 %    Imm Grans % 0.4 %    Neut # 5.52 2.1 - 9.2 x10(3)/mcL    Lymph # 1.21 0.6 - 4.6 x10(3)/mcL    Mono # 0.52 0.1 - 1.3 x10(3)/mcL    Eos # 0.20 0 - 0.9 x10(3)/mcL    Baso # 0.02 <=0.2 x10(3)/mcL    Imm Gran # 0.03 0.00 - 0.04 x10(3)/mcL    NRBC%  0.0 %   Protime-INR    Collection Time: 05/12/25 10:30 AM   Result Value Ref Range    PT 13.8 11.7 - 14.5 seconds    INR 1.0 (L) 2.0 - 3.0   APTT    Collection Time: 05/12/25 10:30 AM   Result Value Ref Range    PTT 35.9 (H) 23.4 - 33.9 seconds   TSH    Collection Time: 05/12/25 10:30 AM   Result Value Ref Range    TSH 2.539 0.350 - 4.940 uIU/mL   Hemoglobin A1C    Collection Time: 05/12/25  4:12 PM   Result Value Ref Range    Hemoglobin A1c 5.4 <=7.0 %    Estimated Average Glucose 108.3 mg/dL   Troponin I    Collection Time: 05/12/25  4:12 PM   Result Value Ref Range    Troponin-I 0.024 0.000 - 0.045 ng/mL     __________________________________________________________________________  INPATIENT LIST OF MEDICATIONS     Scheduled Meds:   [START ON 5/13/2025] atorvastatin  40 mg Oral Daily    carvediloL  12.5 mg Oral BID    [START ON 5/13/2025] clopidogreL  75 mg Oral Daily    furosemide (LASIX) injection  40 mg Intravenous Q12H    [START ON 5/13/2025] insulin glargine U-100 (Lantus)  14 Units Subcutaneous Daily    [START ON 5/13/2025] isosorbide mononitrate  30 mg Oral QAM    [START ON 5/13/2025] levothyroxine  100 mcg Oral Before breakfast    [START ON 5/13/2025] lisinopriL  20 mg Oral Daily    mirtazapine  15 mg Oral QHS    nitroGLYCERIN 2% TD oint  0.5 inch Topical (Top) Q6H    ondansetron        [START ON 5/13/2025] polyethylene glycol  17 g Oral Daily    senna-docusate  1 tablet Oral BID       PRN Meds:  Current Facility-Administered Medications:     acetaminophen, 650 mg, Oral, Q8H PRN    albuterol sulfate, 2.5 mg, Nebulization, Q4H PRN    dextrose 50%, 12.5 g, Intravenous, PRN    dextrose 50%, 25 g, Intravenous, PRN    glucagon (human recombinant), 1 mg, Intramuscular, PRN    glucose, 16 g, Oral, PRN    glucose, 24 g, Oral, PRN    insulin aspart U-100, 0-5 Units, Subcutaneous, QID (AC + HS) PRN    labetalol, 10 mg, Intravenous, Q4H PRN    melatonin, 6 mg, Oral, Nightly PRN    nitroGLYCERIN, 0.4 mg, Sublingual,  Q5 Min PRN    ondansetron, , ,     ondansetron, 4 mg, Intravenous, Q8H PRN    sodium chloride 0.9%, 10 mL, Intravenous, PRN    sodium chloride 0.9%, 10 mL, Intravenous, PRN    traMADoL, 50 mg, Oral, Q6H PRN        ASSESSMENT & PLAN:     HTN urgency  Pulmonary edema  B/l Pleural effusions  Hypoxia  Hx of CAD with stents  Chest pain  DM 2  HLP  Hyperlipidemia    Plan  Consult cardiology  Order iv lasix  Order ntg paste  PRN labetalol  Resume appropriate home meds  Check 2d echo last ef was 60% w/ grade 1 diastolic dysfxn in 1/2025  Continue oxygen    Anil Crawley MD   05/12/2025

## 2025-05-12 NOTE — Clinical Note
Diagnosis: Pulmonary vascular congestion [323486]   Future Attending Provider: MATT CROSS [[id number]]   Admit to which facility:: [facility] [[id number]]   Reason for IP Medical Treatment  (Clinical interventions that can only be accomplished in the IP setting? ) :: highr level of care

## 2025-05-12 NOTE — ED PROVIDER NOTES
Encounter Date: 5/12/2025       History     Chief Complaint   Patient presents with    Cough     Per son, pt c/o cough and sob onset one week ago. States sob worsens with exertion. Pt also c/o pressure to chest and headache.     HPI  79 year  female here with c/o progressive sob for last week- noticed more with exertion and activity. She says feels like a pressure in her chest in addition to feeling fatigued. Has a dry cough for one week. Has hx of cardiac stents and used to be on a diuretic but her pcp stopped it. No fever or chills. Has hx of Asthma but has not had issues for many years. No sore throat. Is up to date with vaccines. Is here with son who translates fully for her.   Review of patient's allergies indicates:  No Known Allergies  Past Medical History:   Diagnosis Date    Diabetes mellitus     Hypertension      Past Surgical History:   Procedure Laterality Date    RETROGRADE INTRAMEDULLARY RODDING OF DISTAL FEMUR Left 11/27/2024    Procedure: INSERTION, INTRAMEDULLARY MARCOS, FEMUR, DISTAL, RETROGRADE;  Surgeon: Godwin Teixeira MD;  Location: [facility];  Service: Orthopedics;  Laterality: Left;     Family History   Problem Relation Name Age of Onset    No Known Problems Mother      No Known Problems Father      No Known Problems Sister      No Known Problems Brother       Social History[1]  Review of Systems   Constitutional:  Positive for activity change and fatigue.   HENT: Negative.     Respiratory:  Positive for cough, chest tightness, shortness of breath and wheezing.    Cardiovascular:  Positive for leg swelling.   Gastrointestinal: Negative.    Musculoskeletal: Negative.    Neurological:  Positive for weakness.   Psychiatric/Behavioral: Negative.     All other systems reviewed and are negative.      Physical Exam     Initial Vitals [05/12/25 0904]   BP Pulse Resp Temp SpO2   (!) 209/64 80 20 97.9 °F (36.6 °C) 95 %      MAP       --         Physical Exam    Nursing note and vitals  reviewed.  Constitutional: She appears well-developed and well-nourished.   HENT:   Head: Normocephalic and atraumatic. Mouth/Throat: Oropharynx is clear and moist.   Eyes: EOM are normal. Pupils are equal, round, and reactive to light.   Neck:   Normal range of motion.  Cardiovascular:  Normal rate, regular rhythm, normal heart sounds and intact distal pulses.           1+ edema LE b/l   Pulmonary/Chest: She has wheezes. She has rhonchi.   Faint exp wheeze   Abdominal: Abdomen is soft. Bowel sounds are normal.   Musculoskeletal:         General: Normal range of motion.      Cervical back: Normal range of motion.     Neurological: She is alert and oriented to person, place, and time. She has normal strength.   Skin: Skin is warm and dry.   Psychiatric: She has a normal mood and affect.         ED Course   Procedures  Labs Reviewed   B-TYPE NATRIURETIC PEPTIDE - Abnormal       Result Value    Natriuretic Peptide 321.6 (*)    COMPREHENSIVE METABOLIC PANEL - Abnormal    Sodium 143      Potassium 4.3      Chloride 109 (*)     CO2 23      Glucose 173 (*)     Blood Urea Nitrogen 22.2 (*)     Creatinine 1.30 (*)     Calcium 9.5      Protein Total 6.9      Albumin 3.5      Globulin 3.4      Albumin/Globulin Ratio 1.0 (*)     Bilirubin Total 0.5      ALP 77      ALT 8      AST 16      eGFR 42      Anion Gap 11.0      BUN/Creatinine Ratio 17     D DIMER, QUANTITATIVE - Abnormal    D-Dimer 2.52 (*)    CBC WITH DIFFERENTIAL - Abnormal    WBC 7.50      RBC 3.14 (*)     Hgb 9.6 (*)     Hct 29.5 (*)     MCV 93.9      MCH 30.6      MCHC 32.5 (*)     RDW 13.4      Platelet 256      MPV 9.8      Neut % 73.6      Lymph % 16.1      Mono % 6.9      Eos % 2.7      Basophil % 0.3      Imm Grans % 0.4      Neut # 5.52      Lymph # 1.21      Mono # 0.52      Eos # 0.20      Baso # 0.02      Imm Gran # 0.03      NRBC% 0.0     PROTIME-INR - Abnormal    PT 13.8      INR 1.0 (*)     Narrative:     Protimes are used to monitor anticoagulant  agents such as warfarin. PT INR values are based on the current patient normal mean and the JESSE value for the specific instrument reagent used.  **Routine theraputic target values for the INR are 2.0-3.0**   APTT - Abnormal    PTT 35.9 (*)    COVID/RSV/FLU A&B PCR - Normal    Influenza A PCR Not Detected      Influenza B PCR Not Detected      Respiratory Syncytial Virus PCR Not Detected      SARS-CoV-2 PCR Not Detected      Narrative:     The Xpert Xpress SARS-CoV-2/FLU/RSV plus is a rapid, multiplexed real-time PCR test intended for the simultaneous qualitative detection and differentiation of SARS-CoV-2, Influenza A, Influenza B, and respiratory syncytial virus (RSV) viral RNA in either nasopharyngeal swab or nasal swab specimens.         TROPONIN I - Normal    Troponin-I 0.027     CBC W/ AUTO DIFFERENTIAL    Narrative:     The following orders were created for panel order CBC auto differential.  Procedure                               Abnormality         Status                     ---------                               -----------         ------                     CBC with Differential[[id number]]       Abnormal            Final result                 Please view results for these tests on the individual orders.     EKG Readings: (Independently Interpreted)   Initial Reading: No STEMI. Rhythm: Normal Sinus Rhythm. Heart Rate: HR 72. Ectopy: No Ectopy PVCs. Conduction: Normal. ST Segments: Normal ST Segments. T Waves: Normal. Axis: Normal. Clinical Impression: Normal Sinus Rhythm with PVCs     ECG Results              EKG 12-lead (Final result)        Collection Time Result Time QRS Duration OHS QTC Calculation    05/12/25 10:04:49 05/12/25 12:07:38 72 413                     Final result by Interface, Lab In [facility] (05/12/25 12:07:42)                   Narrative:    Test Reason : R07.9,    Vent. Rate :  72 BPM     Atrial Rate :  72 BPM     P-R Int : 138 ms          QRS Dur :  72 ms      QT Int : 378 ms        P-R-T Axes :  45  16  51 degrees    QTcB Int : 413 ms    Sinus rhythm with sinus arrhythmia with occasional Premature ventricular  complexes  Otherwise normal ECG  When compared with ECG of 17-Jan-2025 22:15,  Premature ventricular complexes are now Present  Confirmed by Wesly Gant ([id number]) on 5/12/2025 12:07:34 PM    Referred By: AAAREFERRAL SELF           Confirmed By: Wesly Gant                                  Imaging Results              CTA Chest Non-Coronary (PE Studies) (Final result)  Result time 05/12/25 13:56:39   Procedure changed from CTA Pelvis     Final result by Kriss Powers MD (05/12/25 13:56:39)                   Impression:      No pulmonary embolism seen    Bibasilar atelectasis and bilateral pleural effusions    Some pulmonary edema seen bilaterally      Electronically signed by: Bryson Powers  Date:    05/12/2025  Time:    13:56               Narrative:    EXAMINATION:  CTA CHEST NON CORONARY (PE STUDIES)    TECHNIQUE:  Low dose axial images, sagittal and coronal reformations were obtained from the thoracic inlet to the lung bases following the IV administration of contrast..  Contrast timing was optimized to evaluate the pulmonary arteries.  MIP images were performed.  Automated exposure control was utilized    COMPARISON:  None    FINDINGS:  There is bibasilar atelectasis and bilateral pleural effusions.  Pleural effusion on the right side is larger than on the left side is moderate.  Some ground-glass pulmonary edema seen in the lungs bilaterally..  No mass is seen.  No pneumothorax is seen.    No pulmonary embolism is seen.    The thoracic aorta appears normal.  No mediastinal lymphadenopathy is seen.  The heart appears normal.    Upper abdomen shows no acute abnormality.                                       X-Ray Chest 1 View (Final result)  Result time 05/12/25 10:16:53      Final result by Frederic Dobbins MD (05/12/25 10:16:53)                   Impression:      Increase  interstitial and pulmonary vascular markings may indicate a mild degree of congestion.    Small pleural effusions      Electronically signed by: Frederic Dobbins  Date:    05/12/2025  Time:    10:16               Narrative:    EXAMINATION:  XR CHEST 1 VIEW    CPT 70424    CLINICAL HISTORY:  Shortness of breath    COMPARISON:  November 26, 2024    FINDINGS:  Examination reveals mediastinal silhouette to be within normal limits cardiac silhouette is not enlarged there is slight increase in interstitial and pulmonary vascular markings mild degree of congestion cannot be completely excluded.    There is blunting of both costophrenic angles which may indicate the presence of small pleural effusions.    No focal consolidative changes                                    X-Rays:   Independently Interpreted Readings:   Other Readings:  Mild pulmonary congestion     Medications   ondansetron 4 mg/2 mL injection (  Not Given 5/12/25 1148)   furosemide injection 20 mg (has no administration in time range)   methylPREDNISolone sodium succinate injection 125 mg (125 mg Intravenous Given 5/12/25 1101)   albuterol nebulizer solution 2.5 mg (2.5 mg Nebulization Given 5/12/25 0958)   magnesium sulfate in dextrose IVPB (premix) 1 g (1 g Intravenous New Bag 5/12/25 1104)   furosemide injection 20 mg (20 mg Intravenous Given 5/12/25 1104)   acetaminophen tablet 650 mg (650 mg Oral Given 5/12/25 1136)   nitroGLYCERIN 2% TD oint ointment 0.5 inch (0.5 inches Transdermal Given 5/12/25 1259)   hydrALAZINE injection 10 mg (10 mg Intravenous Given 5/12/25 1258)   iohexoL (OMNIPAQUE 350) injection 100 mL (100 mLs Intravenous Given 5/12/25 1347)     Medical Decision Making  Son says she fell and broke her left femur about 4-5 months ago- she was in rehab for 3 months and has been home for > month with  nursing coming out. She is seen by doctors at [facility].  Social determiants of health and co -morbidities reviewed and on  "chart  Diffferential would include chf vs pneumonia vs bronchospasm vs PE vs asthma exacerbation vs ACS  On recheck - patient has voided with lasix, but she is still tachypnic with sitting in bed. Says maybe feelng "little"better. I reviewed her old chart and it does look like in January of 2025 she did have an echo and ultrasounds of lower legs due to concern about PE/DVT secondary to recent ortho surgery. Her bnp was higher then.  She has had low magnesium in the past and her thyroid values have been off.  She is also chronicly anemic with high /normal MCV.  I am going to have her walk with her walker and see how she does. D dimer is elevated and I can not order a CT due to her low renal function GFR 42.     Amount and/or Complexity of Data Reviewed  Labs: ordered.  Radiology: ordered.  Discussion of management or test interpretation with external provider(s): We walked patient and her pulse ox dropped to 84% on RA and she became tachypnic,   I put her back in bed and placed on oxygen.   I will also place nitro on chest and give 10mg Hydralazine for elevated BP.  I will speak with medicine on call,  Lexy Jerome NP about admission and if she wants me to order CTA now or let them order echo and follow with elevated d dimer.  She has no calf pain currenlty.  Spoke with NP who asked me to order CTA now in event has saddle embolus which his reasonable. I will plan to admit to DR Calhoun but notify them of CTA results first.  CTA shows vascular congestion /no PE - will write orders and notify ZAKI Jerome NP    Risk  OTC drugs.  Prescription drug management.  Decision regarding hospitalization.               ED Course as of 05/12/25 1421   Mon May 12, 2025   0930 Cough and congestion with sob with exertion progressive for one week. No fever or chills. Has hx of CAD and Asthma but no inhalers at home. Is followed by Es [LG]      ED Course User Index  [LG] Renetta Lorenzo, DO                           Clinical " Impression:ESPINOZA   Final diagnoses:  [R06.02] Shortness of breath  [R07.9] Chest pain  [R09.89] Pulmonary vascular congestion (Primary)  [R09.02] Hypoxia  [D64.9] Chronic anemia  [N18.9] Chronic renal impairment, unspecified CKD stage          ED Disposition Condition    Admit                     [1]   Social History  Tobacco Use    Smoking status: Never    Smokeless tobacco: Never   Substance Use Topics    Alcohol use: Not Currently        Renetta Lorenzo DO  05/12/25 0662

## 2025-05-13 LAB
ALBUMIN SERPL-MCNC: 3.3 G/DL (ref 3.4–4.8)
ALBUMIN/GLOB SERPL: 1.1 RATIO (ref 1.1–2)
ALP SERPL-CCNC: 78 UNIT/L (ref 40–150)
ALT SERPL-CCNC: 9 UNIT/L (ref 0–55)
ANION GAP SERPL CALC-SCNC: 10 MEQ/L
AORTIC SIZE INDEX (SOV): 1.6 CM/M2
AST SERPL-CCNC: 13 UNIT/L (ref 11–45)
AV INDEX (PROSTH): 0.63
AV MEAN GRADIENT: 7 MMHG
AV PEAK GRADIENT: 13 MMHG
AV VALVE AREA BY VELOCITY RATIO: 1.4 CM²
AV VALVE AREA: 1.6 CM²
AV VELOCITY RATIO: 0.56
BASOPHILS # BLD AUTO: 0.01 X10(3)/MCL
BASOPHILS NFR BLD AUTO: 0.1 %
BILIRUB SERPL-MCNC: 0.4 MG/DL
BSA FOR ECHO PROCEDURE: 1.83 M2
BUN SERPL-MCNC: 29 MG/DL (ref 9.8–20.1)
CALCIUM SERPL-MCNC: 9.3 MG/DL (ref 8.4–10.2)
CHLORIDE SERPL-SCNC: 104 MMOL/L (ref 98–107)
CO2 SERPL-SCNC: 24 MMOL/L (ref 23–31)
CREAT SERPL-MCNC: 1.33 MG/DL (ref 0.55–1.02)
CREAT/UREA NIT SERPL: 22
CV ECHO LV RWT: 0.52 CM
DOP CALC AO PEAK VEL: 1.8 M/S
DOP CALC AO VTI: 36.3 CM
DOP CALC LVOT AREA: 2.5 CM2
DOP CALC LVOT DIAMETER: 1.8 CM
DOP CALC LVOT PEAK VEL: 1 M/S
DOP CALC LVOT STROKE VOLUME: 58 CM3
DOP CALCLVOT PEAK VEL VTI: 22.8 CM
ECHO LV POSTERIOR WALL: 1.1 CM (ref 0.6–1.1)
EOSINOPHIL # BLD AUTO: 0 X10(3)/MCL (ref 0–0.9)
EOSINOPHIL NFR BLD AUTO: 0 %
ERYTHROCYTE [DISTWIDTH] IN BLOOD BY AUTOMATED COUNT: 13.2 % (ref 11.5–17)
FRACTIONAL SHORTENING: 40.5 % (ref 28–44)
GFR SERPLBLD CREATININE-BSD FMLA CKD-EPI: 41 ML/MIN/1.73/M2
GLOBULIN SER-MCNC: 2.9 GM/DL (ref 2.4–3.5)
GLUCOSE SERPL-MCNC: 268 MG/DL (ref 82–115)
HCT VFR BLD AUTO: 26.9 % (ref 37–47)
HGB BLD-MCNC: 8.7 G/DL (ref 12–16)
HR MV ECHO: 71 BPM
IMM GRANULOCYTES # BLD AUTO: 0.04 X10(3)/MCL (ref 0–0.04)
IMM GRANULOCYTES NFR BLD AUTO: 0.4 %
INTERVENTRICULAR SEPTUM: 0.8 CM (ref 0.6–1.1)
LEFT ATRIUM SIZE: 4 CM
LEFT INTERNAL DIMENSION IN SYSTOLE: 2.5 CM (ref 2.1–4)
LEFT VENTRICLE MASS INDEX: 73 G/M2
LEFT VENTRICULAR INTERNAL DIMENSION IN DIASTOLE: 4.2 CM (ref 3.5–6)
LEFT VENTRICULAR MASS: 127.8 G
LYMPHOCYTES # BLD AUTO: 1.21 X10(3)/MCL (ref 0.6–4.6)
LYMPHOCYTES NFR BLD AUTO: 12.4 %
MCH RBC QN AUTO: 30.4 PG (ref 27–31)
MCHC RBC AUTO-ENTMCNC: 32.3 G/DL (ref 33–36)
MCV RBC AUTO: 94.1 FL (ref 80–94)
MITRAL VALVE MEAN VELOCITY: 7.6 M/S
MITRAL VALVE PEAK VELOCITY: 134 CM/S
MONOCYTES # BLD AUTO: 0.81 X10(3)/MCL (ref 0.1–1.3)
MONOCYTES NFR BLD AUTO: 8.3 %
MV MEAN GRADIENT: 3 MMHG
MV PEAK GRADIENT: 7 MMHG
MV STENOSIS PRESSURE HALF TIME: 82 MS
MV VALVE AREA P 1/2 METHOD: 2.68 CM2
NEUTROPHILS # BLD AUTO: 7.65 X10(3)/MCL (ref 2.1–9.2)
NEUTROPHILS NFR BLD AUTO: 78.8 %
NRBC BLD AUTO-RTO: 0 %
OHS QRS DURATION: 72 MS
OHS QTC CALCULATION: 425 MS
PISA TR MAX VEL: 2.2 M/S
PLATELET # BLD AUTO: 255 X10(3)/MCL (ref 130–400)
PMV BLD AUTO: 9.9 FL (ref 7.4–10.4)
POCT GLUCOSE: 234 MG/DL (ref 70–110)
POCT GLUCOSE: 247 MG/DL (ref 70–110)
POCT GLUCOSE: 255 MG/DL (ref 70–110)
POTASSIUM SERPL-SCNC: 4 MMOL/L (ref 3.5–5.1)
PROT SERPL-MCNC: 6.2 GM/DL (ref 5.8–7.6)
RA PRESSURE ESTIMATED: 3 MMHG
RBC # BLD AUTO: 2.86 X10(6)/MCL (ref 4.2–5.4)
RV TB RVSP: 5 MMHG
SINUS: 2.8 CM
SODIUM SERPL-SCNC: 138 MMOL/L (ref 136–145)
TR MAX PG: 20 MMHG
TRICUSPID ANNULAR PLANE SYSTOLIC EXCURSION: 1.9 CM
TROPONIN I SERPL-MCNC: 0.01 NG/ML (ref 0–0.04)
TV REST PULMONARY ARTERY PRESSURE: 22 MMHG
WBC # BLD AUTO: 9.72 X10(3)/MCL (ref 4.5–11.5)
Z-SCORE OF LEFT VENTRICULAR DIMENSION IN END DIASTOLE: -1.42
Z-SCORE OF LEFT VENTRICULAR DIMENSION IN END SYSTOLE: -1.43

## 2025-05-13 PROCEDURE — 63600175 PHARM REV CODE 636 W HCPCS: Performed by: EMERGENCY MEDICINE

## 2025-05-13 PROCEDURE — 25000003 PHARM REV CODE 250: Performed by: EMERGENCY MEDICINE

## 2025-05-13 PROCEDURE — 80053 COMPREHEN METABOLIC PANEL: CPT | Performed by: EMERGENCY MEDICINE

## 2025-05-13 PROCEDURE — 93005 ELECTROCARDIOGRAM TRACING: CPT

## 2025-05-13 PROCEDURE — 85025 COMPLETE CBC W/AUTO DIFF WBC: CPT | Performed by: EMERGENCY MEDICINE

## 2025-05-13 PROCEDURE — 63600175 PHARM REV CODE 636 W HCPCS: Performed by: INTERNAL MEDICINE

## 2025-05-13 PROCEDURE — 25000242 PHARM REV CODE 250 ALT 637 W/ HCPCS: Performed by: INTERNAL MEDICINE

## 2025-05-13 PROCEDURE — 93010 ELECTROCARDIOGRAM REPORT: CPT | Mod: ,,, | Performed by: INTERNAL MEDICINE

## 2025-05-13 PROCEDURE — 25500020 PHARM REV CODE 255: Performed by: HOSPITALIST

## 2025-05-13 PROCEDURE — 21400001 HC TELEMETRY ROOM

## 2025-05-13 PROCEDURE — 36415 COLL VENOUS BLD VENIPUNCTURE: CPT | Performed by: HOSPITALIST

## 2025-05-13 PROCEDURE — 84484 ASSAY OF TROPONIN QUANT: CPT | Performed by: HOSPITALIST

## 2025-05-13 PROCEDURE — 25000003 PHARM REV CODE 250: Performed by: INTERNAL MEDICINE

## 2025-05-13 PROCEDURE — 36415 COLL VENOUS BLD VENIPUNCTURE: CPT | Performed by: EMERGENCY MEDICINE

## 2025-05-13 PROCEDURE — 11000001 HC ACUTE MED/SURG PRIVATE ROOM

## 2025-05-13 RX ADMIN — NITROGLYCERIN 0.5 INCH: 20 OINTMENT TOPICAL at 06:05

## 2025-05-13 RX ADMIN — ONDANSETRON 4 MG: 2 INJECTION INTRAMUSCULAR; INTRAVENOUS at 08:05

## 2025-05-13 RX ADMIN — SENNOSIDES AND DOCUSATE SODIUM 1 TABLET: 8.6; 5 TABLET ORAL at 08:05

## 2025-05-13 RX ADMIN — INSULIN ASPART 3 UNITS: 100 INJECTION, SOLUTION INTRAVENOUS; SUBCUTANEOUS at 06:05

## 2025-05-13 RX ADMIN — MIRTAZAPINE 15 MG: 15 TABLET, FILM COATED ORAL at 08:05

## 2025-05-13 RX ADMIN — INSULIN ASPART 1 UNITS: 100 INJECTION, SOLUTION INTRAVENOUS; SUBCUTANEOUS at 08:05

## 2025-05-13 RX ADMIN — FUROSEMIDE 40 MG: 10 INJECTION, SOLUTION INTRAVENOUS at 08:05

## 2025-05-13 RX ADMIN — ATORVASTATIN CALCIUM 40 MG: 40 TABLET, FILM COATED ORAL at 08:05

## 2025-05-13 RX ADMIN — PERFLUTREN 1 ML: 6.52 INJECTION, SUSPENSION INTRAVENOUS at 05:05

## 2025-05-13 RX ADMIN — LEVOTHYROXINE SODIUM 100 MCG: 100 TABLET ORAL at 06:05

## 2025-05-13 RX ADMIN — ISOSORBIDE MONONITRATE 30 MG: 30 TABLET, EXTENDED RELEASE ORAL at 06:05

## 2025-05-13 RX ADMIN — INSULIN GLARGINE 14 UNITS: 100 INJECTION, SOLUTION SUBCUTANEOUS at 10:05

## 2025-05-13 RX ADMIN — CARVEDILOL 12.5 MG: 12.5 TABLET, FILM COATED ORAL at 08:05

## 2025-05-13 RX ADMIN — NITROGLYCERIN 0.4 MG: 0.4 TABLET, ORALLY DISINTEGRATING SUBLINGUAL at 08:05

## 2025-05-13 RX ADMIN — CLOPIDOGREL 75 MG: 75 TABLET ORAL at 08:05

## 2025-05-13 RX ADMIN — POLYETHYLENE GLYCOL 3350 17 G: 17 POWDER, FOR SOLUTION ORAL at 08:05

## 2025-05-13 RX ADMIN — LISINOPRIL 20 MG: 20 TABLET ORAL at 08:05

## 2025-05-13 RX ADMIN — INSULIN ASPART 2 UNITS: 100 INJECTION, SOLUTION INTRAVENOUS; SUBCUTANEOUS at 10:05

## 2025-05-13 NOTE — CONSULTS
Inpatient Nutrition Assessment    Admit Date: 5/12/2025   Total duration of encounter: 1 day   Patient Age: 79 y.o.    Nutrition Recommendation/Prescription     Diet Heart Healthy ordered, add DM modifier due to CBG >200 mg/dL  Add Boost Glucose Control (provides 190 kcal and 16 g protein per container)  Assess knowledge retention for Heart Healthy diet  Mirtazapine ordered per MAR  Miralax and senna docusate ordered per MAR  Encouraged adequate PO intake  Monitor appetite/PO intake, weight, and labs    Communication of Recommendations: reviewed with patient and reviewed with family    Nutrition Assessment     Malnutrition Assessment/Nutrition-Focused Physical Exam  Does not meet criteria 5/13/2025                                                              A minimum of two characteristics is recommended for diagnosis of either severe or non-severe malnutrition.    Chart Review    Reason Seen: continuous nutrition monitoring and physician consult for diet education    Malnutrition Screening Tool Results   Have you recently lost weight without trying?: No  Have you been eating poorly because of a decreased appetite?: No   MST Score: 0   Diagnosis:  HTN urgency  Pulmonary edema  B/l Pleural effusions  Hypoxia  Hx of CAD with stents  Chest pain  DM 2  HLP  Hyperlipidemia    Relevant Medical History:    CAD (coronary artery disease)      Diabetes mellitus      Hypertension      Hypothyroidism, unspecified        Scheduled Medications:  atorvastatin, 40 mg, Daily  carvediloL, 12.5 mg, BID  clopidogreL, 75 mg, Daily  furosemide (LASIX) injection, 40 mg, Q12H  insulin glargine U-100 (Lantus), 14 Units, Daily  isosorbide mononitrate, 30 mg, QAM  levothyroxine, 100 mcg, Before breakfast  lisinopriL, 20 mg, Daily  mirtazapine, 15 mg, QHS  nitroGLYCERIN 2% TD oint, 0.5 inch, Q6H  polyethylene glycol, 17 g, Daily  senna-docusate, 1 tablet, BID    Continuous Infusions:   PRN Medications:  acetaminophen, 650 mg, Q8H  PRN  albuterol sulfate, 2.5 mg, Q4H PRN  dextrose 50%, 12.5 g, PRN  dextrose 50%, 25 g, PRN  glucagon (human recombinant), 1 mg, PRN  glucose, 16 g, PRN  glucose, 24 g, PRN  insulin aspart U-100, 0-5 Units, QID (AC + HS) PRN  labetalol, 10 mg, Q4H PRN  melatonin, 6 mg, Nightly PRN  nitroGLYCERIN, 0.4 mg, Q5 Min PRN  ondansetron, 4 mg, Q8H PRN  sodium chloride 0.9%, 10 mL, PRN  sodium chloride 0.9%, 10 mL, PRN  traMADoL, 50 mg, Q6H PRN    Calorie Containing IV Medications: no significant kcals from medications at this time    Recent Labs   Lab 05/12/25  1030 05/12/25  1612 05/13/25  0520 05/13/25  0521     --  138  --    K 4.3  --  4.0  --    CALCIUM 9.5  --  9.3  --    *  --  104  --    CO2 23  --  24  --    BUN 22.2*  --  29.0*  --    CREATININE 1.30*  --  1.33*  --    EGFRNORACEVR 42  --  41  --    *  --  268*  --    BILITOT 0.5  --  0.4  --    ALKPHOS 77  --  78  --    ALT 8  --  9  --    AST 16  --  13  --    ALBUMIN 3.5  --  3.3*  --    HGBA1C  --  5.4  --   --    WBC 7.50  --   --  9.72   HGB 9.6*  --   --  8.7*   HCT 29.5*  --   --  26.9*     Nutrition Orders:  Diet Heart Healthy      Appetite/Oral Intake: poor/25-50% of meals  Factors Affecting Nutritional Intake: decreased appetite and nausea  Social Needs Impacting Access to Food: none identified  Food/Rastafarian/Cultural Preferences: none reported  Food Allergies: none reported  Last Bowel Movement: 05/12/25  Wound(s):  none noted    Comments    5/13/2025: Pt reports a decreased appetite/PO intake for 1 week prior to admit. Pt reports a poor appetite/PO intake. Pt denies vomiting, constipation, diarrhea, and chewing/swallowing difficulties. Pt reports some nausea. Per EMR, pt weighed 78 kg on 2/24/2025. Last BM noted. Pt agreeable to chocolate ONS. Encouraged adequate PO intake. Provided Heart Healthy Diet education. Mirtazapine ordered per MAR. Miralax and senna docusate ordered per MAR. Will monitor.    Anthropometrics    Height: 4'  "11" (149.9 cm), Height Method: Stated  Last Weight: 80.4 kg (177 lb 4 oz) (25 0543), Weight Method: Bed Scale  BMI (Calculated): 35.8  BMI Classification: obese grade II (BMI 35-39.9)     Ideal Body Weight (IBW), Female: 95 lb     % Ideal Body Weight, Female (lb): 186.58 %                    Usual Body Weight (UBW), k kg  % Usual Body Weight: 103.29     Usual Weight Provided By: EMR weight history    Wt Readings from Last 5 Encounters:   25 80.4 kg (177 lb 4 oz)   25 78 kg (171 lb 15.3 oz)   25 78 kg (171 lb 15.3 oz)   25 78 kg (172 lb)   25 84 kg (185 lb 3 oz)     Weight Change(s) Since Admission:   2025: 80.4 kg  Wt Readings from Last 1 Encounters:   2543 80.4 kg (177 lb 4 oz)   25 80.4 kg (177 lb 4 oz)   25 78.9 kg (174 lb)   Admit Weight: 78.9 kg (174 lb) (25 09), Weight Method: Stated    Estimated Needs    Weight Used For Calorie Calculations: 80.4 kg (177 lb 4 oz)  Energy Calorie Requirements (kcal):  (25 kcal/kg)  Energy Need Method: Kcal/kg  Weight Used For Protein Calculations: 80.4 kg (177 lb 4 oz)  Protein Requirements: 80-96 (1.0-1.2 g/kg)  Fluid Requirements (mL):  (1 mL/kcal)  CHO Requirement: 226-276 g/day (~45-55% est min kcal needs)     Enteral Nutrition     Patient not receiving enteral nutrition at this time.    Parenteral Nutrition     Patient not receiving parenteral nutrition support at this time.    Evaluation of Received Nutrient Intake    Calories: not meeting estimated needs  Protein: not meeting estimated needs    Patient Education     Education Provided: heart healthy diet  Teaching Method: explanation and printed materials  Comprehension: verbalizes understanding and needs reinforcement  Barriers to Learning: none evident  Expected Compliance: fair  Comments: All questions were answered and dietitian's contact information was provided.     Nutrition Diagnosis     PES: Inadequate oral intake " related to acute illness as evidenced by decreased appetite/PO intake for 1 week. (new)     PES:            Nutrition Interventions     Intervention(s): general/healthful diet and commercial beverage  Intervention(s):      Goal: Meet greater than 80% of nutritional needs by follow-up. (new)  Goal: Consume % of oral supplements by follow-up. (new)    Nutrition Goals & Monitoring     Dietitian will monitor: food and beverage intake, weight, electrolyte/renal panel, glucose/endocrine profile, and gastrointestinal profile  Discharge planning: continue Heart Healthy DM diet with Boost Glucose Control if needed oral supplements  Nutrition Risk/Follow-Up: dietitian will follow-up one time per week   Please consult if re-assessment needed sooner.

## 2025-05-13 NOTE — H&P
[facility] Medicine History & Physical Examination       Patient Name: Gabriela Lozano  MRN: [id number]  Patient Class: IP- Inpatient   Admission Date: 5/12/2025   Admitting Physician:  Service   Attending Physician: Martin Spencer MD  Primary Care Provider: Petr, Monica WEISS MD (Inactive)  Face-to-Face encounter date: 05/13/2025  Code Status:Code Status Discussion Note   Chief Complaint: Cough (Per son, pt c/o cough and sob onset one week ago. States sob worsens with exertion. Pt also c/o pressure to chest and headache.)         Patient information was obtained from patient, patient's family, past medical records and ER records.     HISTORY OF PRESENT ILLNESS:   Gabriela Lozano is a 79 y.o. female who  has a past medical history of CAD (coronary artery disease), Diabetes mellitus, Hypertension, and Hypothyroidism, unspecified.. The patient presented to [facility] on 5/12/2025      79 y.o. female who  has a past medical history of CAD (coronary artery disease), Diabetes mellitus, Hypertension, and Hypothyroidism, unspecified. The patient presented to [facility] on 5/12/2025 with a primary complaint of dyspnea and chest pain.  The patient has had worsening dyspnea on exertion for the last week. She was recetnly started on a diuretic but only for about 1 week. She's not sure why it was stopped by her physician. She report episodes on chest tightness. She had an episode of severe sternal tightness last week that only last a few seconds. She had another episode today that wasn't as severe so she came to the ER for evaluation. In addition to ESPINOZA with minimal activity, she has orthopnea, weakness, occasional nausea with exertion. She was at rehab earlier this year an she was able to participate full w/o any of these symptoms. She's had a cardiac stent placed many years ago and a repeat angiogram was done about 3 years ago with 3 more stents placed. Pt reports she has been  compliant with all her bp meds and plavix. She's had excessive bleeding after procedures done while on plavix. She also has an occasional dry cough. Her room air O2 sat was 84%. Her bp has been elevated in the /85, 210/116. She says it is normall well controlled in the 130s. The patient is a [place]isian speaker. Her son was at the bedside to help translate.     Patient was transferred to [facility] on  and accepted to the hospitalist group.  At the time of my visit she is doing well.  Denies any chest pain.  She reports good urine output.  She is thirsty.  Denies any palpitations or dyspnea.  On 2 L nasal cannula but good oxygen saturation.    PAST MEDICAL HISTORY:     Past Medical History:   Diagnosis Date    CAD (coronary artery disease)     Diabetes mellitus     Hypertension     Hypothyroidism, unspecified        PAST SURGICAL HISTORY:     Past Surgical History:   Procedure Laterality Date     SECTION      CORONARY ANGIOPLASTY WITH STENT PLACEMENT      HYSTERECTOMY      RETROGRADE INTRAMEDULLARY RODDING OF DISTAL FEMUR Left 2024    Procedure: INSERTION, INTRAMEDULLARY MARCOS, FEMUR, DISTAL, RETROGRADE;  Surgeon: Godwin Teixeira MD;  Location: [facility];  Service: Orthopedics;  Laterality: Left;       ALLERGIES:   Patient has no known allergies.    FAMILY HISTORY:   Reviewed and negative    SOCIAL HISTORY:     Social History     Tobacco Use    Smoking status: Never    Smokeless tobacco: Never   Substance Use Topics    Alcohol use: Not Currently        HOME MEDICATIONS:     Prior to Admission medications    Medication Sig Start Date End Date Taking? Authorizing Provider   atorvastatin (LIPITOR) 40 MG tablet Take 40 mg by mouth once daily.   Yes Provider, Historical   carvediloL (COREG) 3.125 MG tablet Take 6.25 mg by mouth 2 (two) times daily. 7/15/24  Yes Provider, Historical   clopidogreL (PLAVIX) 75 mg tablet Take 75 mg by mouth once daily.   Yes Provider, Historical   gabapentin (NEURONTIN)  600 MG tablet Take 0.5 tablets (300 mg total) by mouth 3 (three) times daily. 1/18/25  Yes Orquidea Reese MD   isosorbide mononitrate (IMDUR) 30 MG 24 hr tablet Take 30 mg by mouth every morning. 3/16/25  Yes Provider, Historical   levothyroxine (SYNTHROID) 100 MCG tablet 1 tablet in the morning on an empty stomach Orally Once a day for 100 days 10/1/24  Yes Provider, Historical   lisinopriL (PRINIVIL,ZESTRIL) 20 MG tablet 1 tablet Orally Once a day for 100 days 12/6/24  Yes Provider, Historical   metFORMIN (GLUCOPHAGE) 1000 MG tablet Take 1,000 mg by mouth 2 (two) times daily.   Yes Provider, Historical   traMADoL (ULTRAM) 50 mg tablet 1 tablet as needed Orally Once a day for 14 days  As needed 5/1/25  Yes Provider, Historical   acetaminophen (TYLENOL) 650 MG TbSR Take 1 tablet (650 mg total) by mouth every 8 (eight) hours as needed (Pain). 1/18/25   Orquidea Reese MD   cholestyramine-aspartame (CHOLESTYRAMINE LIGHT) 4 gram PwPk Take 1 packet (4 grams of anhydrous cholestyramine total) by mouth daily as needed (Diarrhea). 1/18/25   Orquidea Reese MD   furosemide (LASIX) 20 MG tablet Take 1 tablet (20 mg total) by mouth daily as needed (In case of swelling, weight gain more than 2 lb overnight). 1/18/25   Orquidea Reese MD   insulin glargine U-100, Lantus, 100 unit/mL injection Inject 14 Units into the skin every evening. 1/18/25 2/24/25  Orquidea Reese MD   mirtazapine (REMERON) 15 MG tablet Take 15 mg by mouth every evening. 12/18/24   Provider, Historical   NOVOLIN R FLEXPEN 100 unit/mL (3 mL) InPn pen  12/9/24   Provider, Historical   oxyCODONE (ROXICODONE) 10 mg Tab immediate release tablet Take 1 tablet (10 mg total) by mouth every 6 (six) hours as needed for Pain.  Patient not taking: Reported on 4/21/2025 12/5/24   Call, Ankita A, FNP       REVIEW OF SYSTEMS:   Except as documented, all other systems reviewed and negative     PHYSICAL EXAM:     VITAL SIGNS: 24 HRS MIN & MAX LAST   Temp  Min: 97.6 °F  (36.4 °C)  Max: 97.9 °F (36.6 °C) 97.8 °F (36.6 °C)   BP  Min: 128/64  Max: 228/85 (!) 148/90   Pulse  Min: 61  Max: 96  72   Resp  Min: 18  Max: 34 19   SpO2  Min: 94 %  Max: 100 % 99 %       General appearance: Well-developed, well-nourished female in no apparent distress.  HENT: Atraumatic head. Moist mucous membranes of oral cavity.  Eyes: Normal extraocular movements.   Neck: Supple.   Lungs:  Coarse breath sounds bilaterally  Heart: Regular rate and rhythm. S1 and S2 present with no murmurs/gallop/rub. No pedal edema. No JVD present.   Abdomen: Soft, non-distended, non-tender. No rebound tenderness/guarding. Bowel sounds are normal.   Extremities: No cyanosis, clubbing, +1 edema in bilateral lower extremities  Skin: No Rash.   Neuro: Motor and sensory exams grossly intact. Good tone. Muscle strength 5/5 in all 4 extremities  Psych/mental status: Appropriate mood and affect. Responds appropriately to questions.     LABS AND IMAGING:     Recent Labs   Lab 05/12/25  1030 05/13/25  0521   WBC 7.50 9.72   RBC 3.14* 2.86*   HGB 9.6* 8.7*   HCT 29.5* 26.9*   MCV 93.9 94.1*   MCH 30.6 30.4   MCHC 32.5* 32.3*   RDW 13.4 13.2    255   MPV 9.8 9.9       Recent Labs   Lab 05/12/25  1030 05/13/25  0520    138   K 4.3 4.0   * 104   CO2 23 24   BUN 22.2* 29.0*   CREATININE 1.30* 1.33*   * 268*   CALCIUM 9.5 9.3   ALBUMIN 3.5 3.3*   PROT 6.9 6.2   ALKPHOS 77 78   ALT 8 9   AST 16 13   BILITOT 0.5 0.4       Microbiology Results (last 7 days)       ** No results found for the last 168 hours. **             CTA Chest Non-Coronary (PE Studies)  Narrative: EXAMINATION:  CTA CHEST NON CORONARY (PE STUDIES)    TECHNIQUE:  Low dose axial images, sagittal and coronal reformations were obtained from the thoracic inlet to the lung bases following the IV administration of contrast..  Contrast timing was optimized to evaluate the pulmonary arteries.  MIP images were performed.  Automated exposure control was  utilized    COMPARISON:  None    FINDINGS:  There is bibasilar atelectasis and bilateral pleural effusions.  Pleural effusion on the right side is larger than on the left side is moderate.  Some ground-glass pulmonary edema seen in the lungs bilaterally..  No mass is seen.  No pneumothorax is seen.    No pulmonary embolism is seen.    The thoracic aorta appears normal.  No mediastinal lymphadenopathy is seen.  The heart appears normal.    Upper abdomen shows no acute abnormality.  Impression: No pulmonary embolism seen    Bibasilar atelectasis and bilateral pleural effusions    Some pulmonary edema seen bilaterally    Electronically signed by: Bryson Powers  Date:    05/12/2025  Time:    13:56  X-Ray Chest 1 View  Narrative: EXAMINATION:  XR CHEST 1 VIEW    CPT 55073    CLINICAL HISTORY:  Shortness of breath    COMPARISON:  November 26, 2024    FINDINGS:  Examination reveals mediastinal silhouette to be within normal limits cardiac silhouette is not enlarged there is slight increase in interstitial and pulmonary vascular markings mild degree of congestion cannot be completely excluded.    There is blunting of both costophrenic angles which may indicate the presence of small pleural effusions.    No focal consolidative changes  Impression: Increase interstitial and pulmonary vascular markings may indicate a mild degree of congestion.    Small pleural effusions    Electronically signed by: Frederic Dobbins  Date:    05/12/2025  Time:    10:16      _____________________________________  INPATIENT LIST OF MEDICATIONS   Current Medications[1]      Scheduled Meds:   atorvastatin  40 mg Oral Daily    carvediloL  12.5 mg Oral BID    clopidogreL  75 mg Oral Daily    furosemide (LASIX) injection  40 mg Intravenous Q12H    insulin glargine U-100 (Lantus)  14 Units Subcutaneous Daily    isosorbide mononitrate  30 mg Oral QAM    levothyroxine  100 mcg Oral Before breakfast    lisinopriL  20 mg Oral Daily    mirtazapine  15 mg  Oral QHS    nitroGLYCERIN 2% TD oint  0.5 inch Topical (Top) Q6H    polyethylene glycol  17 g Oral Daily    senna-docusate  1 tablet Oral BID     Continuous Infusions:  PRN Meds:.  Current Facility-Administered Medications:     acetaminophen, 650 mg, Oral, Q8H PRN    albuterol sulfate, 2.5 mg, Nebulization, Q4H PRN    dextrose 50%, 12.5 g, Intravenous, PRN    dextrose 50%, 25 g, Intravenous, PRN    glucagon (human recombinant), 1 mg, Intramuscular, PRN    glucose, 16 g, Oral, PRN    glucose, 24 g, Oral, PRN    insulin aspart U-100, 0-5 Units, Subcutaneous, QID (AC + HS) PRN    labetalol, 10 mg, Intravenous, Q4H PRN    melatonin, 6 mg, Oral, Nightly PRN    nitroGLYCERIN, 0.4 mg, Sublingual, Q5 Min PRN    ondansetron, 4 mg, Intravenous, Q8H PRN    sodium chloride 0.9%, 10 mL, Intravenous, PRN    sodium chloride 0.9%, 10 mL, Intravenous, PRN    traMADoL, 50 mg, Oral, Q6H PRN      VTE Prophylaxis: will be placed on appropriate DVT prophylaxis and will be advised to be as mobile as possible and sit in a chair as tolerated  _____________________________________    ASSESSMENT & PLAN:   HTN urgency  Pulmonary edema  B/l Pleural effusions  Hypoxia  Hx of CAD with stents  Chest pain  DM 2  HLP  Hyperlipidemia    Chest pain has subsided.  Troponin negative.  BNP only mildly elevated.  She did have a slight bump in her creatinine.  Will continue with diuretics for now but need to monitor closely.    Blood pressure much better controlled.  She did have a slight drop in her hemoglobin from 9.6-8.7.  Check iron profile  Wean O2 as tolerated  CT of the chest negative for pulmonary embolus but she does have bilateral effusions.  Echocardiogram pending      Martin Spencer MD  7:20 AM 05/13/2025    Screening for Social Drivers for health:  Patient screened for food insecurity, housing instability, transportation needs, utility difficulties, and interpersonal safety (select all that apply as identified as concern)  []Housing or  Food  []Transportation Needs  []Utility Difficulties  []Interpersonal safety  [x]None             [1]   Current Facility-Administered Medications:     acetaminophen tablet 650 mg, 650 mg, Oral, Q8H PRN, Anil Crawley MD    albuterol nebulizer solution 2.5 mg, 2.5 mg, Nebulization, Q4H PRN, Anil Crawley MD    atorvastatin tablet 40 mg, 40 mg, Oral, Daily, Anil Crawlye MD    carvediloL tablet 12.5 mg, 12.5 mg, Oral, BID, Anil Crawley MD, 12.5 mg at 05/12/25 2115    clopidogreL tablet 75 mg, 75 mg, Oral, Daily, Anil Crawley MD    dextrose 50% injection 12.5 g, 12.5 g, Intravenous, PRN, Anil Crawley MD    dextrose 50% injection 25 g, 25 g, Intravenous, PRN, Anil Crawley MD    furosemide injection 40 mg, 40 mg, Intravenous, Q12H, Anil Crawley MD, 40 mg at 05/12/25 2115    glucagon (human recombinant) injection 1 mg, 1 mg, Intramuscular, PRN, Anil Crawley MD    glucose chewable tablet 16 g, 16 g, Oral, PRN, Anil Crawley MD    glucose chewable tablet 24 g, 24 g, Oral, PRN, Anil Crawley MD    insulin aspart U-100 injection 0-5 Units, 0-5 Units, Subcutaneous, QID (AC + HS) PRN, Anil Crawley MD    insulin glargine U-100 (Lantus) injection 14 Units, 14 Units, Subcutaneous, Daily, Anil Crawley MD    isosorbide mononitrate 24 hr tablet 30 mg, 30 mg, Oral, QAM, Anil Crawley MD, 30 mg at 05/13/25 0609    labetaloL injection 10 mg, 10 mg, Intravenous, Q4H PRN, Anil Crawley MD    levothyroxine tablet 100 mcg, 100 mcg, Oral, Before breakfast, Anil Crawley MD, 100 mcg at 05/13/25 0609    lisinopriL tablet 20 mg, 20 mg, Oral, Daily, Anil Crawley MD    melatonin tablet 6 mg, 6 mg, Oral, Nightly PRN, Renetta Lorenzo DO    mirtazapine tablet 15 mg, 15 mg, Oral, QHS, Anil Crawley MD    nitroGLYCERIN 2% TD oint ointment 0.5 inch, 0.5 inch, Topical (Top), Q6H, Anil Crawley MD, 0.5 inch at 05/13/25 0609    nitroGLYCERIN SL tablet 0.4 mg, 0.4 mg, Sublingual, Q5 Min PRN, Anil Crawley MD    ondansetron  injection 4 mg, 4 mg, Intravenous, Q8H PRN, Renetta Lorenzo DO    polyethylene glycol packet 17 g, 17 g, Oral, Daily, Anil Crawley MD    senna-docusate 8.6-50 mg per tablet 1 tablet, 1 tablet, Oral, BID, Renetta Lorenzo DO, 1 tablet at 05/12/25 2116    sodium chloride 0.9% flush 10 mL, 10 mL, Intravenous, PRN, Renetta Lorenzo DO    sodium chloride 0.9% flush 10 mL, 10 mL, Intravenous, PRN, Anil Crawley MD    traMADoL tablet 50 mg, 50 mg, Oral, Q6H PRN, Anil Crawley MD

## 2025-05-13 NOTE — CONSULTS
Consults  [facility]    Cardiology  Consult Note    Patient Name: Gabriela Lozano  MRN: [id number]  Admission Date: 2025  Hospital Length of Stay: 1 days  Code Status: Full Code   Attending Provider: Martin Spencer MD   Consulting Provider: JOSE R Dean  Primary Care Physician: Monica Humphreys MD (Inactive)  Principal Problem:Pulmonary vascular congestion    Patient information was obtained from patient and ER records.     Subjective:     Chief Complaint/Reason for Consult: Pulmonary edema, chest pain and hx of CAD    HPI:   Ms. Gabriela Lozano is an 79-year-old known to Dr. Chatterjee (), who presented to [facility] on 2025 with complaints of progressive shortness of breath and chest pressure.  She has a history of CAD s/p PCI to the LAD and RCA in , HTN, HLD, hypothyroidism, DM2, obesity.  Upon workup, troponin was 0.02, , H&H 8.7/, CTA of the chest-no PE, bibasilar atelectasis and bilateral pleural effusions and some pulmonary edema seen bilaterally. SBP was in the 200's upon arrival.  Cis has been consulted for CHF and chest pain.    PMH: CAD s/p PCI to the LAD and RCA in , HTN, HLD, hypothyroidism, DM2, obesity.  PSH: Coronary stents, , hysterectomy, femur surgery  Family History: Brother-DM 2  Social History: Denied smoking, alcohol and drugs    Previous Cardiac Diagnostics:   [facility] 3/29/17   Left main-normal, lad 90% InStent stenosis stented with drug-eluting stent, mid LAD to distal 70% InStent restenosis stented with drug-eluting stent, RCA stented with drug-eluting stent    Past Medical History:   Diagnosis Date    CAD (coronary artery disease)     Diabetes mellitus     Hypertension     Hypothyroidism, unspecified      Past Surgical History:   Procedure Laterality Date     SECTION      CORONARY ANGIOPLASTY WITH STENT PLACEMENT      HYSTERECTOMY      RETROGRADE INTRAMEDULLARY RODDING OF DISTAL FEMUR Left 2024     Procedure: INSERTION, INTRAMEDULLARY MARCOS, FEMUR, DISTAL, RETROGRADE;  Surgeon: Godwin Teixeira MD;  Location: [facility];  Service: Orthopedics;  Laterality: Left;     Review of patient's allergies indicates:  No Known Allergies  No current facility-administered medications on file prior to encounter.     Current Outpatient Medications on File Prior to Encounter   Medication Sig    atorvastatin (LIPITOR) 40 MG tablet Take 40 mg by mouth once daily.    carvediloL (COREG) 3.125 MG tablet Take 6.25 mg by mouth 2 (two) times daily.    clopidogreL (PLAVIX) 75 mg tablet Take 75 mg by mouth once daily.    gabapentin (NEURONTIN) 600 MG tablet Take 0.5 tablets (300 mg total) by mouth 3 (three) times daily.    isosorbide mononitrate (IMDUR) 30 MG 24 hr tablet Take 30 mg by mouth every morning.    levothyroxine (SYNTHROID) 100 MCG tablet 1 tablet in the morning on an empty stomach Orally Once a day for 100 days    lisinopriL (PRINIVIL,ZESTRIL) 20 MG tablet 1 tablet Orally Once a day for 100 days    metFORMIN (GLUCOPHAGE) 1000 MG tablet Take 1,000 mg by mouth 2 (two) times daily.    traMADoL (ULTRAM) 50 mg tablet 1 tablet as needed Orally Once a day for 14 days  As needed    acetaminophen (TYLENOL) 650 MG TbSR Take 1 tablet (650 mg total) by mouth every 8 (eight) hours as needed (Pain).    cholestyramine-aspartame (CHOLESTYRAMINE LIGHT) 4 gram PwPk Take 1 packet (4 grams of anhydrous cholestyramine total) by mouth daily as needed (Diarrhea).    furosemide (LASIX) 20 MG tablet Take 1 tablet (20 mg total) by mouth daily as needed (In case of swelling, weight gain more than 2 lb overnight).    insulin glargine U-100, Lantus, 100 unit/mL injection Inject 14 Units into the skin every evening.    mirtazapine (REMERON) 15 MG tablet Take 15 mg by mouth every evening.    NOVOLIN R FLEXPEN 100 unit/mL (3 mL) InPn pen     oxyCODONE (ROXICODONE) 10 mg Tab immediate release tablet Take 1 tablet (10 mg total) by mouth every 6 (six) hours as  needed for Pain. (Patient not taking: Reported on 4/21/2025)     Family History       Problem Relation (Age of Onset)    No Known Problems Mother, Father, Sister, Brother          Tobacco Use    Smoking status: Never    Smokeless tobacco: Never   Substance and Sexual Activity    Alcohol use: Not Currently    Drug use: Never    Sexual activity: Not on file       Review of Systems   Respiratory:  Positive for shortness of breath.    Cardiovascular:  Positive for chest pain and leg swelling.   All other systems reviewed and are negative.    Objective:     Vital Signs (Most Recent):  Temp: 97.8 °F (36.6 °C) (05/13/25 0719)  Pulse: 72 (05/13/25 0719)  Resp: 19 (05/13/25 0719)  BP: (!) 148/90 (05/13/25 0719)  SpO2: 99 % (05/13/25 0719) Vital Signs (24h Range):  Temp:  [97.6 °F (36.4 °C)-97.9 °F (36.6 °C)] 97.8 °F (36.6 °C)  Pulse:  [61-96] 72  Resp:  [18-34] 19  SpO2:  [94 %-100 %] 99 %  BP: (128-228)/() 148/90   Weight: 80.4 kg (177 lb 4 oz)  Body mass index is 35.8 kg/m².  SpO2: 99 %       Intake/Output Summary (Last 24 hours) at 5/13/2025 0747  Last data filed at 5/13/2025 0637  Gross per 24 hour   Intake --   Output 3100 ml   Net -3100 ml     Lines/Drains/Airways       Peripheral Intravenous Line  Duration                  Peripheral IV - Single Lumen 05/12/25 1025 22 G 1 in Posterior;Right Forearm <1 day                  Significant Labs:   Chemistries:   Recent Labs   Lab 05/12/25  1030 05/12/25  1612 05/13/25  0520     --  138   K 4.3  --  4.0   *  --  104   CO2 23  --  24   BUN 22.2*  --  29.0*   CREATININE 1.30*  --  1.33*   CALCIUM 9.5  --  9.3   PROT 6.9  --  6.2   BILITOT 0.5  --  0.4   ALKPHOS 77  --  78   ALT 8  --  9   AST 16  --  13   TROPONINI 0.027 0.024  --         CBC/Anemia Labs: Coags:    Recent Labs   Lab 05/12/25  1030 05/13/25  0521   WBC 7.50 9.72   HGB 9.6* 8.7*   HCT 29.5* 26.9*    255   MCV 93.9 94.1*   RDW 13.4 13.2    Recent Labs   Lab 05/12/25  1030   INR 1.0*    APTT 35.9*        Significant Imaging:  Imaging Results              CTA Chest Non-Coronary (PE Studies) (Final result)  Result time 05/12/25 13:56:39   Procedure changed from CTA Pelvis     Final result by Kriss Powers MD (05/12/25 13:56:39)                   Impression:      No pulmonary embolism seen    Bibasilar atelectasis and bilateral pleural effusions    Some pulmonary edema seen bilaterally      Electronically signed by: Bryson Powers  Date:    05/12/2025  Time:    13:56               Narrative:    EXAMINATION:  CTA CHEST NON CORONARY (PE STUDIES)    TECHNIQUE:  Low dose axial images, sagittal and coronal reformations were obtained from the thoracic inlet to the lung bases following the IV administration of contrast..  Contrast timing was optimized to evaluate the pulmonary arteries.  MIP images were performed.  Automated exposure control was utilized    COMPARISON:  None    FINDINGS:  There is bibasilar atelectasis and bilateral pleural effusions.  Pleural effusion on the right side is larger than on the left side is moderate.  Some ground-glass pulmonary edema seen in the lungs bilaterally..  No mass is seen.  No pneumothorax is seen.    No pulmonary embolism is seen.    The thoracic aorta appears normal.  No mediastinal lymphadenopathy is seen.  The heart appears normal.    Upper abdomen shows no acute abnormality.                                       X-Ray Chest 1 View (Final result)  Result time 05/12/25 10:16:53      Final result by Frederic Dobbins MD (05/12/25 10:16:53)                   Impression:      Increase interstitial and pulmonary vascular markings may indicate a mild degree of congestion.    Small pleural effusions      Electronically signed by: Frederic Dobbins  Date:    05/12/2025  Time:    10:16               Narrative:    EXAMINATION:  XR CHEST 1 VIEW    CPT 87970    CLINICAL HISTORY:  Shortness of breath    COMPARISON:  November 26, 2024    FINDINGS:  Examination  reveals mediastinal silhouette to be within normal limits cardiac silhouette is not enlarged there is slight increase in interstitial and pulmonary vascular markings mild degree of congestion cannot be completely excluded.    There is blunting of both costophrenic angles which may indicate the presence of small pleural effusions.    No focal consolidative changes                                    EKG:     Telemetry:  NSR    Physical Exam  Vitals reviewed.   Constitutional:       Appearance: Normal appearance.   HENT:      Mouth/Throat:      Mouth: Mucous membranes are moist.   Cardiovascular:      Rate and Rhythm: Normal rate and regular rhythm.      Pulses: Normal pulses.      Heart sounds: Normal heart sounds.   Pulmonary:      Effort: Pulmonary effort is normal.      Breath sounds: Rales present.   Abdominal:      General: Bowel sounds are normal.      Palpations: Abdomen is soft.   Musculoskeletal:         General: Normal range of motion.   Skin:     General: Skin is warm and dry.      Capillary Refill: Capillary refill takes less than 2 seconds.   Neurological:      Mental Status: She is alert and oriented to person, place, and time.   Psychiatric:         Behavior: Behavior normal.       Home Medications:   Medications Ordered Prior to Encounter[1]  Current Schedule Inpatient Medications:   atorvastatin  40 mg Oral Daily    carvediloL  12.5 mg Oral BID    clopidogreL  75 mg Oral Daily    furosemide (LASIX) injection  40 mg Intravenous Q12H    insulin glargine U-100 (Lantus)  14 Units Subcutaneous Daily    isosorbide mononitrate  30 mg Oral QAM    levothyroxine  100 mcg Oral Before breakfast    lisinopriL  20 mg Oral Daily    mirtazapine  15 mg Oral QHS    nitroGLYCERIN 2% TD oint  0.5 inch Topical (Top) Q6H    polyethylene glycol  17 g Oral Daily    senna-docusate  1 tablet Oral BID     Continuous Infusions:    Assessment:   HTN urgency  Pulmonary edema  Bilateral pleural effusions  Anemia  CAD  S/p coronary  stent placement to the LAD and RCA 3/17  HTN  HLD  DM 2  Hypothyroidism      Plan:   Continue lasix 40 mg IV BID  Obtain an echocardiogram  Continue plavix, coreg, lisinopril, imdur  Start nifedipine 30 mg po daily  Strict I & O  Keep K>4 and Mag>2  Trend cardiac enzymes  Labs in am: CBC, CMP, FLP    Thank you for your consult.     Ankita Quiroga, RAHULP  Cardiology  [facility]          [1]   No current facility-administered medications on file prior to encounter.     Current Outpatient Medications on File Prior to Encounter   Medication Sig Dispense Refill    atorvastatin (LIPITOR) 40 MG tablet Take 40 mg by mouth once daily.      carvediloL (COREG) 3.125 MG tablet Take 6.25 mg by mouth 2 (two) times daily.      clopidogreL (PLAVIX) 75 mg tablet Take 75 mg by mouth once daily.      gabapentin (NEURONTIN) 600 MG tablet Take 0.5 tablets (300 mg total) by mouth 3 (three) times daily.      isosorbide mononitrate (IMDUR) 30 MG 24 hr tablet Take 30 mg by mouth every morning.      levothyroxine (SYNTHROID) 100 MCG tablet 1 tablet in the morning on an empty stomach Orally Once a day for 100 days      lisinopriL (PRINIVIL,ZESTRIL) 20 MG tablet 1 tablet Orally Once a day for 100 days      metFORMIN (GLUCOPHAGE) 1000 MG tablet Take 1,000 mg by mouth 2 (two) times daily.      traMADoL (ULTRAM) 50 mg tablet 1 tablet as needed Orally Once a day for 14 days  As needed      acetaminophen (TYLENOL) 650 MG TbSR Take 1 tablet (650 mg total) by mouth every 8 (eight) hours as needed (Pain).      cholestyramine-aspartame (CHOLESTYRAMINE LIGHT) 4 gram PwPk Take 1 packet (4 grams of anhydrous cholestyramine total) by mouth daily as needed (Diarrhea).      furosemide (LASIX) 20 MG tablet Take 1 tablet (20 mg total) by mouth daily as needed (In case of swelling, weight gain more than 2 lb overnight).      insulin glargine U-100, Lantus, 100 unit/mL injection Inject 14 Units into the skin every evening.       mirtazapine (REMERON) 15 MG tablet Take 15 mg by mouth every evening.      NOVOLIN R FLEXPEN 100 unit/mL (3 mL) InPn pen       oxyCODONE (ROXICODONE) 10 mg Tab immediate release tablet Take 1 tablet (10 mg total) by mouth every 6 (six) hours as needed for Pain. (Patient not taking: Reported on 4/21/2025) 28 tablet 0

## 2025-05-13 NOTE — PLAN OF CARE
Problem: Adult Inpatient Plan of Care  Goal: Plan of Care Review  Outcome: Progressing  Goal: Patient-Specific Goal (Individualized)  Outcome: Progressing  Goal: Absence of Hospital-Acquired Illness or Injury  Outcome: Progressing  Goal: Optimal Comfort and Wellbeing  Outcome: Progressing  Goal: Readiness for Transition of Care  Outcome: Progressing     Problem: Acute Kidney Injury/Impairment  Goal: Fluid and Electrolyte Balance  Outcome: Progressing  Goal: Improved Oral Intake  Outcome: Progressing  Goal: Effective Renal Function  Outcome: Progressing     Problem: Diabetes Comorbidity  Goal: Blood Glucose Level Within Targeted Range  Outcome: Progressing     Problem: Fall Injury Risk  Goal: Absence of Fall and Fall-Related Injury  Outcome: Progressing

## 2025-05-13 NOTE — PLAN OF CARE
05/13/25 0925   Discharge Assessment   Assessment Type Discharge Planning Assessment   Confirmed/corrected address, phone number and insurance Yes   Confirmed Demographics Correct on Facesheet   Source of Information patient;family   If unable to respond/provide information was family/caregiver contacted? Yes   Contact Name/Number Nicole dumont [phone]   When was your last doctors appointment?   (needs pcp, will set up on her own)   Reason For Admission Pulmonary vascular congestion   People in Home alone   Do you expect to return to your current living situation? Yes   Do you have help at home or someone to help you manage your care at home? No   Prior to hospitilization cognitive status: Alert/Oriented   Current cognitive status: Alert/Oriented   Walking or Climbing Stairs Difficulty yes   Walking or Climbing Stairs ambulation difficulty, requires equipment   Mobility Management walker   Dressing/Bathing Difficulty yes   Dressing/Bathing bathing difficulty, requires equipment   Dressing/Bathing Management sc   Home Accessibility wheelchair accessible   Equipment Currently Used at Home blood pressure machine;walker, rolling;shower chair   Readmission within 30 days? No   Patient currently being followed by outpatient case management? No   Do you currently have service(s) that help you manage your care at home?   (unk)   Do you take prescription medications? Yes   Do you have prescription coverage? Yes   Do you have any problems affording any of your prescribed medications? No   Is the patient taking medications as prescribed? yes   Who is going to help you get home at discharge? family   How do you get to doctors appointments? family or friend will provide   Are you on dialysis? No   Do you take coumadin? No   Discharge Plan A Home   Discharge Plan B Home   DME Needed Upon Discharge  none   Discharge Plan discussed with: Patient  (nicole)   Transition of Care Barriers None   Physical Activity   On average,  how many days per week do you engage in moderate to strenuous exercise (like a brisk walk)? 0 days   On average, how many minutes do you engage in exercise at this level? 0 min   Financial Resource Strain   How hard is it for you to pay for the very basics like food, housing, medical care, and heating? Not very   Housing Stability   In the last 12 months, was there a time when you were not able to pay the mortgage or rent on time? N   At any time in the past 12 months, were you homeless or living in a shelter (including now)? N   Food Insecurity   Within the past 12 months, you worried that your food would run out before you got the money to buy more. Never true   Within the past 12 months, the food you bought just didn't last and you didn't have money to get more. Never true   Stress   Do you feel stress - tense, restless, nervous, or anxious, or unable to sleep at night because your mind is troubled all the time - these days? Not at all   Social Isolation   How often do you feel lonely or isolated from those around you?  Never   Alcohol Use   Q1: How often do you have a drink containing alcohol? Never   Utilities   In the past 12 months has the electric, gas, oil, or water company threatened to shut off services in your home? No   Health Literacy   How often do you need to have someone help you when you read instructions, pamphlets, or other written material from your doctor or pharmacy? Sometimes  (Trinidadian speaking)

## 2025-05-14 LAB
ANION GAP SERPL CALC-SCNC: 10 MEQ/L
BASOPHILS # BLD AUTO: 0.02 X10(3)/MCL
BASOPHILS NFR BLD AUTO: 0.3 %
BUN SERPL-MCNC: 36.1 MG/DL (ref 9.8–20.1)
CALCIUM SERPL-MCNC: 8.6 MG/DL (ref 8.4–10.2)
CHLORIDE SERPL-SCNC: 104 MMOL/L (ref 98–107)
CO2 SERPL-SCNC: 26 MMOL/L (ref 23–31)
CREAT SERPL-MCNC: 1.76 MG/DL (ref 0.55–1.02)
CREAT/UREA NIT SERPL: 21
EOSINOPHIL # BLD AUTO: 0.28 X10(3)/MCL (ref 0–0.9)
EOSINOPHIL NFR BLD AUTO: 3.6 %
ERYTHROCYTE [DISTWIDTH] IN BLOOD BY AUTOMATED COUNT: 13.6 % (ref 11.5–17)
FOLATE SERPL-MCNC: 13.4 NG/ML (ref 7–31.4)
GFR SERPLBLD CREATININE-BSD FMLA CKD-EPI: 29 ML/MIN/1.73/M2
GLUCOSE SERPL-MCNC: 132 MG/DL (ref 82–115)
HCT VFR BLD AUTO: 24.2 % (ref 37–47)
HGB BLD-MCNC: 7.5 G/DL (ref 12–16)
IMM GRANULOCYTES # BLD AUTO: 0.02 X10(3)/MCL (ref 0–0.04)
IMM GRANULOCYTES NFR BLD AUTO: 0.3 %
IRON SATN MFR SERPL: 25 % (ref 20–50)
IRON SERPL-MCNC: 40 UG/DL (ref 50–170)
LYMPHOCYTES # BLD AUTO: 2.2 X10(3)/MCL (ref 0.6–4.6)
LYMPHOCYTES NFR BLD AUTO: 28.5 %
MCH RBC QN AUTO: 29.8 PG (ref 27–31)
MCHC RBC AUTO-ENTMCNC: 31 G/DL (ref 33–36)
MCV RBC AUTO: 96 FL (ref 80–94)
MONOCYTES # BLD AUTO: 0.65 X10(3)/MCL (ref 0.1–1.3)
MONOCYTES NFR BLD AUTO: 8.4 %
NEUTROPHILS # BLD AUTO: 4.54 X10(3)/MCL (ref 2.1–9.2)
NEUTROPHILS NFR BLD AUTO: 58.9 %
NRBC BLD AUTO-RTO: 0 %
OHS QRS DURATION: 70 MS
OHS QTC CALCULATION: 415 MS
PLATELET # BLD AUTO: 222 X10(3)/MCL (ref 130–400)
PMV BLD AUTO: 10.1 FL (ref 7.4–10.4)
POCT GLUCOSE: 121 MG/DL (ref 70–110)
POCT GLUCOSE: 198 MG/DL (ref 70–110)
POCT GLUCOSE: 215 MG/DL (ref 70–110)
POTASSIUM SERPL-SCNC: 4 MMOL/L (ref 3.5–5.1)
RBC # BLD AUTO: 2.52 X10(6)/MCL (ref 4.2–5.4)
SODIUM SERPL-SCNC: 140 MMOL/L (ref 136–145)
TIBC SERPL-MCNC: 123 UG/DL (ref 70–310)
TIBC SERPL-MCNC: 163 UG/DL (ref 250–450)
TRANSFERRIN SERPL-MCNC: 142 MG/DL
VIT B12 SERPL-MCNC: 253 PG/ML (ref 213–816)
WBC # BLD AUTO: 7.71 X10(3)/MCL (ref 4.5–11.5)

## 2025-05-14 PROCEDURE — 93010 ELECTROCARDIOGRAM REPORT: CPT | Mod: ,,, | Performed by: INTERNAL MEDICINE

## 2025-05-14 PROCEDURE — 85025 COMPLETE CBC W/AUTO DIFF WBC: CPT | Performed by: HOSPITALIST

## 2025-05-14 PROCEDURE — 25000003 PHARM REV CODE 250: Performed by: EMERGENCY MEDICINE

## 2025-05-14 PROCEDURE — 94760 N-INVAS EAR/PLS OXIMETRY 1: CPT

## 2025-05-14 PROCEDURE — 83540 ASSAY OF IRON: CPT | Performed by: HOSPITALIST

## 2025-05-14 PROCEDURE — 21400001 HC TELEMETRY ROOM

## 2025-05-14 PROCEDURE — 11000001 HC ACUTE MED/SURG PRIVATE ROOM

## 2025-05-14 PROCEDURE — 93005 ELECTROCARDIOGRAM TRACING: CPT

## 2025-05-14 PROCEDURE — 82607 VITAMIN B-12: CPT | Performed by: HOSPITALIST

## 2025-05-14 PROCEDURE — 27000221 HC OXYGEN, UP TO 24 HOURS

## 2025-05-14 PROCEDURE — 99900035 HC TECH TIME PER 15 MIN (STAT)

## 2025-05-14 PROCEDURE — 25000003 PHARM REV CODE 250: Performed by: INTERNAL MEDICINE

## 2025-05-14 PROCEDURE — 82746 ASSAY OF FOLIC ACID SERUM: CPT | Performed by: HOSPITALIST

## 2025-05-14 PROCEDURE — 25000242 PHARM REV CODE 250 ALT 637 W/ HCPCS: Performed by: INTERNAL MEDICINE

## 2025-05-14 PROCEDURE — 63600175 PHARM REV CODE 636 W HCPCS: Performed by: INTERNAL MEDICINE

## 2025-05-14 PROCEDURE — 36415 COLL VENOUS BLD VENIPUNCTURE: CPT | Performed by: HOSPITALIST

## 2025-05-14 PROCEDURE — 80048 BASIC METABOLIC PNL TOTAL CA: CPT | Performed by: HOSPITALIST

## 2025-05-14 PROCEDURE — 94761 N-INVAS EAR/PLS OXIMETRY MLT: CPT

## 2025-05-14 RX ADMIN — INSULIN ASPART 2 UNITS: 100 INJECTION, SOLUTION INTRAVENOUS; SUBCUTANEOUS at 06:05

## 2025-05-14 RX ADMIN — MIRTAZAPINE 15 MG: 15 TABLET, FILM COATED ORAL at 09:05

## 2025-05-14 RX ADMIN — SENNOSIDES AND DOCUSATE SODIUM 1 TABLET: 8.6; 5 TABLET ORAL at 09:05

## 2025-05-14 RX ADMIN — SENNOSIDES AND DOCUSATE SODIUM 1 TABLET: 8.6; 5 TABLET ORAL at 08:05

## 2025-05-14 RX ADMIN — ATORVASTATIN CALCIUM 40 MG: 40 TABLET, FILM COATED ORAL at 08:05

## 2025-05-14 RX ADMIN — LEVOTHYROXINE SODIUM 100 MCG: 100 TABLET ORAL at 05:05

## 2025-05-14 RX ADMIN — CARVEDILOL 12.5 MG: 12.5 TABLET, FILM COATED ORAL at 09:05

## 2025-05-14 RX ADMIN — INSULIN ASPART 3 UNITS: 100 INJECTION, SOLUTION INTRAVENOUS; SUBCUTANEOUS at 11:05

## 2025-05-14 RX ADMIN — INSULIN GLARGINE 14 UNITS: 100 INJECTION, SOLUTION SUBCUTANEOUS at 08:05

## 2025-05-14 RX ADMIN — NITROGLYCERIN 0.5 INCH: 20 OINTMENT TOPICAL at 06:05

## 2025-05-14 RX ADMIN — ISOSORBIDE MONONITRATE 30 MG: 30 TABLET, EXTENDED RELEASE ORAL at 06:05

## 2025-05-14 RX ADMIN — NITROGLYCERIN 0.5 INCH: 20 OINTMENT TOPICAL at 12:05

## 2025-05-14 RX ADMIN — POLYETHYLENE GLYCOL 3350 17 G: 17 POWDER, FOR SOLUTION ORAL at 08:05

## 2025-05-14 RX ADMIN — CARVEDILOL 12.5 MG: 12.5 TABLET, FILM COATED ORAL at 08:05

## 2025-05-14 RX ADMIN — NITROGLYCERIN 0.4 MG: 0.4 TABLET, ORALLY DISINTEGRATING SUBLINGUAL at 01:05

## 2025-05-14 RX ADMIN — LISINOPRIL 20 MG: 20 TABLET ORAL at 08:05

## 2025-05-14 RX ADMIN — CLOPIDOGREL 75 MG: 75 TABLET ORAL at 08:05

## 2025-05-14 NOTE — PROGRESS NOTES
[facility]  Hospital Medicine Progress Note        Chief Complaint: Inpatient Follow-up     HPI:   79 y.o. female who  has a past medical history of CAD (coronary artery disease), Diabetes mellitus, Hypertension, and Hypothyroidism, unspecified. The patient presented to [facility] on 5/12/2025 with a primary complaint of dyspnea and chest pain.  The patient has had worsening dyspnea on exertion for the last week. She was recetnly started on a diuretic but only for about 1 week. She's not sure why it was stopped by her physician. She report episodes on chest tightness. She had an episode of severe sternal tightness last week that only last a few seconds. She had another episode today that wasn't as severe so she came to the ER for evaluation. In addition to ESPINOZA with minimal activity, she has orthopnea, weakness, occasional nausea with exertion. She was at rehab earlier this year an she was able to participate full w/o any of these symptoms. She's had a cardiac stent placed many years ago and a repeat angiogram was done about 3 years ago with 3 more stents placed. Pt reports she has been compliant with all her bp meds and plavix. She's had excessive bleeding after procedures done while on plavix. She also has an occasional dry cough. Her room air O2 sat was 84%. Her bp has been elevated in the /85, 210/116. She says it is normall well controlled in the 130s. The patient is a [place] speaker. Her son was at the bedside to help translate.      Patient was transferred to [facility] on 05/13 and accepted to the hospitalist group.  At the time of my visit she is doing well.  Denies any chest pain.  She reports good urine output.  She is thirsty.  Denies any palpitations or dyspnea.  On 2 L nasal cannula but good oxygen saturation.  Echocardiogram performed on 05/13 without any evidence of systolic or diastolic heart failure.    Interval Hx:  Patient doing well this morning.  Currently on  room air.  Denies any chest pain or palpitations.  That has not really ambulated.  Her hemoglobin is trending down slowly.  Does not need a transfusion at this point but will check iron profile.  I discussed her results of her echocardiogram that showed no evidence of heart failure.  Suspect that some of her volume overload may have been from hypertensive urgency which has since resolved.    Objective/physical exam:  General: In no acute distress, afebrile  Chest: Clear to auscultation bilaterally  Heart: RRR, +S1, S2, no appreciable murmur  Abdomen: Soft, nontender, BS +  MSK: Warm, no lower extremity edema, no clubbing or cyanosis  Neurologic: Alert and oriented x4, Cranial nerve II-XII intact, Strength 5/5 in all 4 extremities    VITAL SIGNS: 24 HRS MIN & MAX LAST   Temp  Min: 97.6 °F (36.4 °C)  Max: 98.2 °F (36.8 °C) 97.6 °F (36.4 °C)   BP  Min: 103/48  Max: 155/87 132/69   Pulse  Min: 63  Max: 77  64   Resp  Min: 18  Max: 20 20   SpO2  Min: 98 %  Max: 100 % 99 %       Recent Labs   Lab 05/12/25  1030 05/13/25  0521 05/14/25  0438   WBC 7.50 9.72 7.71   RBC 3.14* 2.86* 2.52*   HGB 9.6* 8.7* 7.5*   HCT 29.5* 26.9* 24.2*   MCV 93.9 94.1* 96.0*   MCH 30.6 30.4 29.8   MCHC 32.5* 32.3* 31.0*   RDW 13.4 13.2 13.6    255 222   MPV 9.8 9.9 10.1       Recent Labs   Lab 05/12/25  1030 05/13/25  0520 05/14/25  0438    138 140   K 4.3 4.0 4.0   * 104 104   CO2 23 24 26   BUN 22.2* 29.0* 36.1*   CREATININE 1.30* 1.33* 1.76*   * 268* 132*   CALCIUM 9.5 9.3 8.6   ALBUMIN 3.5 3.3*  --    PROT 6.9 6.2  --    ALKPHOS 77 78  --    ALT 8 9  --    AST 16 13  --    BILITOT 0.5 0.4  --           Microbiology Results (last 7 days)       ** No results found for the last 168 hours. **             Radiology:  Echo    Left Ventricle: The left ventricle is normal in size. Normal wall   thickness. There is normal systolic function with a visually estimated   ejection fraction of 60 - 65%. There is normal diastolic  function.    Right Ventricle: Systolic function is normal.    Mitral Valve: The mitral valve is structurally normal.    Tricuspid Valve: There is trace regurgitation.    Pulmonary Artery: The estimated pulmonary artery systolic pressure is   22 mmHg.    Pericardium: There is no pericardial effusion.        Medications:  Scheduled Meds:   atorvastatin  40 mg Oral Daily    carvediloL  12.5 mg Oral BID    clopidogreL  75 mg Oral Daily    furosemide (LASIX) injection  40 mg Intravenous Q12H    insulin glargine U-100 (Lantus)  14 Units Subcutaneous Daily    isosorbide mononitrate  30 mg Oral QAM    levothyroxine  100 mcg Oral Before breakfast    lisinopriL  20 mg Oral Daily    mirtazapine  15 mg Oral QHS    nitroGLYCERIN 2% TD oint  0.5 inch Topical (Top) Q6H    polyethylene glycol  17 g Oral Daily    senna-docusate  1 tablet Oral BID     Continuous Infusions:  PRN Meds:.  Current Facility-Administered Medications:     acetaminophen, 650 mg, Oral, Q8H PRN    albuterol sulfate, 2.5 mg, Nebulization, Q4H PRN    dextrose 50%, 12.5 g, Intravenous, PRN    dextrose 50%, 25 g, Intravenous, PRN    glucagon (human recombinant), 1 mg, Intramuscular, PRN    glucose, 16 g, Oral, PRN    glucose, 24 g, Oral, PRN    insulin aspart U-100, 0-5 Units, Subcutaneous, QID (AC + HS) PRN    labetalol, 10 mg, Intravenous, Q4H PRN    melatonin, 6 mg, Oral, Nightly PRN    nitroGLYCERIN, 0.4 mg, Sublingual, Q5 Min PRN    ondansetron, 4 mg, Intravenous, Q8H PRN    sodium chloride 0.9%, 10 mL, Intravenous, PRN    sodium chloride 0.9%, 10 mL, Intravenous, PRN    traMADoL, 50 mg, Oral, Q6H PRN    Nutrition:  Nutrition consulted. Most recent weight and BMI monitored-     Measurements:  Wt Readings from Last 1 Encounters:   05/14/25 80.1 kg (176 lb 9.4 oz)   Body mass index is 35.67 kg/m².    Patient has been screened and assessed by RD.    Malnutrition Type:  Context:    Level:      Malnutrition Characteristic Summary:        Interventions/Recommendations (treatment strategy):           Assessment/Plan:   HTN urgency  Pulmonary edema  B/l Pleural effusions  Hypoxia  Hx of CAD with stents  Chest pain  DM 2  HLP  Hyperlipidemia     Hypertensive urgency has resolved and no evidence of heart failure.  Will DC IV Lasix as her renal function seems to be decreasing.  She did have a significant drop in her hemoglobin again.  7.6 this morning.  Will hold off on transfusing.  Anemia is macrocytic.  Iron profile was okay.  Check B12 and folate.  No report of hematochezia or melena.  Will check an FOB T as well but low suspicion for acute GI bleeding  She is now on room air.  We would like to get her up and moving today    Martin Spencer MD   05/14/2025     All diagnosis and differential diagnosis have been reviewed; assessment and plan has been documented; I have personally reviewed the labs and test results that are presently available; I have reviewed the patients medication list; I have reviewed the consulting providers response and recommendations. I have reviewed or attempted to review medical records based upon their availability    All of the patient's questions have been  addressed and answered. Patient's is agreeable to the above stated plan. I will continue to monitor closely and make adjustments to medical management as needed.  _____________________________________________________________________

## 2025-05-14 NOTE — PROGRESS NOTES
Consults  [facility]    Cardiology  Consult Note    Patient Name: Gabriela Lozano  MRN: [id number]  Admission Date: 2025  Hospital Length of Stay: 2 days  Code Status: Full Code   Attending Provider: Martin Spencer MD   Consulting Provider: JOSE R Dean  Primary Care Physician: No primary care provider on file.  Principal Problem:Pulmonary vascular congestion    Patient information was obtained from patient and ER records.     Subjective:     Chief Complaint/Reason for Consult: Pulmonary edema, chest pain and hx of CAD    HPI:   Ms. Gabriela Lozano is an 79-year-old known to Dr. Chatterjee (), who presented to [facility] on 2025 with complaints of progressive shortness of breath and chest pressure.  She has a history of CAD s/p PCI to the LAD and RCA in 2017, HTN, HLD, hypothyroidism, DM2, obesity.  Upon workup, troponin was 0.02, , H&H 8.7/27, CTA of the chest-no PE, bibasilar atelectasis and bilateral pleural effusions and some pulmonary edema seen bilaterally. SBP was in the 200's upon arrival.  Cis has been consulted for CHF and chest pain.    PMH: CAD s/p PCI to the LAD and RCA in 2017, HTN, HLD, hypothyroidism, DM2, obesity.  PSH: Coronary stents, , hysterectomy, femur surgery  Family History: Brother-DM 2  Social History: Denied smoking, alcohol and drugs    Previous Cardiac Diagnostics:   Echocardiogram 5.13.25  Left Ventricle: The left ventricle is normal in size. Normal wall thickness. There is normal systolic function with a visually estimated ejection fraction of 60 - 65%. There is normal diastolic function.  Right Ventricle: Systolic function is normal.  Mitral Valve: The mitral valve is structurally normal.  Tricuspid Valve: There is trace regurgitation.  Pulmonary Artery: The estimated pulmonary artery systolic pressure is 22 mmHg.  Pericardium: There is no pericardial effusion.     [facility] 3/29/17   Left main-normal, lad 90% InStent  stenosis stented with drug-eluting stent, mid LAD to distal 70% InStent restenosis stented with drug-eluting stent, RCA stented with drug-eluting stent    Past Medical History:   Diagnosis Date    CAD (coronary artery disease)     Diabetes mellitus     Hypertension     Hypothyroidism, unspecified      Past Surgical History:   Procedure Laterality Date     SECTION      CORONARY ANGIOPLASTY WITH STENT PLACEMENT      HYSTERECTOMY      RETROGRADE INTRAMEDULLARY RODDING OF DISTAL FEMUR Left 2024    Procedure: INSERTION, INTRAMEDULLARY MARCOS, FEMUR, DISTAL, RETROGRADE;  Surgeon: Godwin Teixeira MD;  Location: [facility];  Service: Orthopedics;  Laterality: Left;     Review of patient's allergies indicates:  No Known Allergies  No current facility-administered medications on file prior to encounter.     Current Outpatient Medications on File Prior to Encounter   Medication Sig    atorvastatin (LIPITOR) 40 MG tablet Take 40 mg by mouth once daily.    carvediloL (COREG) 3.125 MG tablet Take 6.25 mg by mouth 2 (two) times daily.    clopidogreL (PLAVIX) 75 mg tablet Take 75 mg by mouth once daily.    gabapentin (NEURONTIN) 600 MG tablet Take 0.5 tablets (300 mg total) by mouth 3 (three) times daily.    isosorbide mononitrate (IMDUR) 30 MG 24 hr tablet Take 30 mg by mouth every morning.    levothyroxine (SYNTHROID) 100 MCG tablet 1 tablet in the morning on an empty stomach Orally Once a day for 100 days    lisinopriL (PRINIVIL,ZESTRIL) 20 MG tablet 1 tablet Orally Once a day for 100 days    metFORMIN (GLUCOPHAGE) 1000 MG tablet Take 1,000 mg by mouth 2 (two) times daily.    traMADoL (ULTRAM) 50 mg tablet 1 tablet as needed Orally Once a day for 14 days  As needed    acetaminophen (TYLENOL) 650 MG TbSR Take 1 tablet (650 mg total) by mouth every 8 (eight) hours as needed (Pain).    cholestyramine-aspartame (CHOLESTYRAMINE LIGHT) 4 gram PwPk Take 1 packet (4 grams of anhydrous cholestyramine total) by mouth daily as  needed (Diarrhea).    furosemide (LASIX) 20 MG tablet Take 1 tablet (20 mg total) by mouth daily as needed (In case of swelling, weight gain more than 2 lb overnight).    insulin glargine U-100, Lantus, 100 unit/mL injection Inject 14 Units into the skin every evening.    mirtazapine (REMERON) 15 MG tablet Take 15 mg by mouth every evening.    NOVOLIN R FLEXPEN 100 unit/mL (3 mL) InPn pen     oxyCODONE (ROXICODONE) 10 mg Tab immediate release tablet Take 1 tablet (10 mg total) by mouth every 6 (six) hours as needed for Pain. (Patient not taking: Reported on 4/21/2025)     Family History       Problem Relation (Age of Onset)    No Known Problems Mother, Father, Sister, Brother          Tobacco Use    Smoking status: Never    Smokeless tobacco: Never   Substance and Sexual Activity    Alcohol use: Not Currently    Drug use: Never    Sexual activity: Not on file       Review of Systems   Respiratory:  Negative for shortness of breath.    Cardiovascular:  Negative for chest pain and leg swelling.   All other systems reviewed and are negative.    Objective:     Vital Signs (Most Recent):  Temp: 98 °F (36.7 °C) (05/14/25 1101)  Pulse: 74 (05/14/25 1101)  Resp: 18 (05/14/25 1101)  BP: (!) 136/58 (05/14/25 1101)  SpO2: 97 % (05/14/25 1101) Vital Signs (24h Range):  Temp:  [97.5 °F (36.4 °C)-98.2 °F (36.8 °C)] 98 °F (36.7 °C)  Pulse:  [63-77] 74  Resp:  [18-20] 18  SpO2:  [94 %-99 %] 97 %  BP: (108-136)/(38-94) 136/58   Weight: 80.1 kg (176 lb 9.4 oz)  Body mass index is 35.67 kg/m².  SpO2: 97 %       Intake/Output Summary (Last 24 hours) at 5/14/2025 1412  Last data filed at 5/14/2025 0300  Gross per 24 hour   Intake --   Output 1600 ml   Net -1600 ml     Lines/Drains/Airways       Peripheral Intravenous Line  Duration                  Peripheral IV - Single Lumen 05/12/25 1025 22 G 1 in Posterior;Right Forearm 2 days                  Significant Labs:   Chemistries:   Recent Labs   Lab 05/12/25  1030 05/12/25  1614  05/13/25  0520 05/13/25  0842 05/14/25  0438     --  138  --  140   K 4.3  --  4.0  --  4.0   *  --  104  --  104   CO2 23  --  24  --  26   BUN 22.2*  --  29.0*  --  36.1*   CREATININE 1.30*  --  1.33*  --  1.76*   CALCIUM 9.5  --  9.3  --  8.6   PROT 6.9  --  6.2  --   --    BILITOT 0.5  --  0.4  --   --    ALKPHOS 77  --  78  --   --    ALT 8  --  9  --   --    AST 16  --  13  --   --    TROPONINI 0.027 0.024  --  0.014  --         CBC/Anemia Labs: Coags:    Recent Labs   Lab 05/12/25  1030 05/13/25  0521 05/14/25  0438 05/14/25  0710   WBC 7.50 9.72 7.71  --    HGB 9.6* 8.7* 7.5*  --    HCT 29.5* 26.9* 24.2*  --     255 222  --    MCV 93.9 94.1* 96.0*  --    RDW 13.4 13.2 13.6  --    IRON  --   --  40*  --    FOLATE  --   --   --  13.4   [id number]  --   --  253  --     Recent Labs   Lab 05/12/25  1030   INR 1.0*   APTT 35.9*        Significant Imaging:  Imaging Results              CTA Chest Non-Coronary (PE Studies) (Final result)  Result time 05/12/25 13:56:39   Procedure changed from CTA Pelvis     Final result by Kriss Powers MD (05/12/25 13:56:39)                   Impression:      No pulmonary embolism seen    Bibasilar atelectasis and bilateral pleural effusions    Some pulmonary edema seen bilaterally      Electronically signed by: Bryson Powers  Date:    05/12/2025  Time:    13:56               Narrative:    EXAMINATION:  CTA CHEST NON CORONARY (PE STUDIES)    TECHNIQUE:  Low dose axial images, sagittal and coronal reformations were obtained from the thoracic inlet to the lung bases following the IV administration of contrast..  Contrast timing was optimized to evaluate the pulmonary arteries.  MIP images were performed.  Automated exposure control was utilized    COMPARISON:  None    FINDINGS:  There is bibasilar atelectasis and bilateral pleural effusions.  Pleural effusion on the right side is larger than on the left side is moderate.  Some ground-glass pulmonary edema  seen in the lungs bilaterally..  No mass is seen.  No pneumothorax is seen.    No pulmonary embolism is seen.    The thoracic aorta appears normal.  No mediastinal lymphadenopathy is seen.  The heart appears normal.    Upper abdomen shows no acute abnormality.                                       X-Ray Chest 1 View (Final result)  Result time 05/12/25 10:16:53      Final result by Frederic Dobbins MD (05/12/25 10:16:53)                   Impression:      Increase interstitial and pulmonary vascular markings may indicate a mild degree of congestion.    Small pleural effusions      Electronically signed by: Frederic Dobbins  Date:    05/12/2025  Time:    10:16               Narrative:    EXAMINATION:  XR CHEST 1 VIEW    CPT 33155    CLINICAL HISTORY:  Shortness of breath    COMPARISON:  November 26, 2024    FINDINGS:  Examination reveals mediastinal silhouette to be within normal limits cardiac silhouette is not enlarged there is slight increase in interstitial and pulmonary vascular markings mild degree of congestion cannot be completely excluded.    There is blunting of both costophrenic angles which may indicate the presence of small pleural effusions.    No focal consolidative changes                                    EKG:     Telemetry:  NSR    Physical Exam  Vitals reviewed.   Constitutional:       Appearance: Normal appearance.   HENT:      Mouth/Throat:      Mouth: Mucous membranes are moist.   Cardiovascular:      Rate and Rhythm: Normal rate and regular rhythm.      Pulses: Normal pulses.      Heart sounds: Normal heart sounds.   Pulmonary:      Effort: Pulmonary effort is normal.      Breath sounds: No rales.   Abdominal:      General: Bowel sounds are normal.      Palpations: Abdomen is soft.   Musculoskeletal:         General: Normal range of motion.   Skin:     General: Skin is warm and dry.      Capillary Refill: Capillary refill takes less than 2 seconds.   Neurological:      Mental Status: She  is alert and oriented to person, place, and time.   Psychiatric:         Behavior: Behavior normal.       Home Medications:   Medications Ordered Prior to Encounter[1]  Current Schedule Inpatient Medications:   atorvastatin  40 mg Oral Daily    carvediloL  12.5 mg Oral BID    clopidogreL  75 mg Oral Daily    insulin glargine U-100 (Lantus)  14 Units Subcutaneous Daily    isosorbide mononitrate  30 mg Oral QAM    levothyroxine  100 mcg Oral Before breakfast    lisinopriL  20 mg Oral Daily    mirtazapine  15 mg Oral QHS    nitroGLYCERIN 2% TD oint  0.5 inch Topical (Top) Q6H    polyethylene glycol  17 g Oral Daily    senna-docusate  1 tablet Oral BID     Continuous Infusions:    Assessment:   HTN urgency  Pulmonary edema  Bilateral pleural effusions  Anemia  CAD  S/p coronary stent placement to the LAD and RCA 3/17  HTN  HLD  DM 2  Hypothyroidism      Plan:     Continue plavix, coreg, lisinopril, imdur  Strict I & O  Keep K>4 and Mag>2  Follow up with Dr. Chatterjee in 1-2 weeks.  Will be available as needed.        JOSE R Dean  Cardiology  [facility]          [1]   No current facility-administered medications on file prior to encounter.     Current Outpatient Medications on File Prior to Encounter   Medication Sig Dispense Refill    atorvastatin (LIPITOR) 40 MG tablet Take 40 mg by mouth once daily.      carvediloL (COREG) 3.125 MG tablet Take 6.25 mg by mouth 2 (two) times daily.      clopidogreL (PLAVIX) 75 mg tablet Take 75 mg by mouth once daily.      gabapentin (NEURONTIN) 600 MG tablet Take 0.5 tablets (300 mg total) by mouth 3 (three) times daily.      isosorbide mononitrate (IMDUR) 30 MG 24 hr tablet Take 30 mg by mouth every morning.      levothyroxine (SYNTHROID) 100 MCG tablet 1 tablet in the morning on an empty stomach Orally Once a day for 100 days      lisinopriL (PRINIVIL,ZESTRIL) 20 MG tablet 1 tablet Orally Once a day for 100 days      metFORMIN (GLUCOPHAGE) 1000  MG tablet Take 1,000 mg by mouth 2 (two) times daily.      traMADoL (ULTRAM) 50 mg tablet 1 tablet as needed Orally Once a day for 14 days  As needed      acetaminophen (TYLENOL) 650 MG TbSR Take 1 tablet (650 mg total) by mouth every 8 (eight) hours as needed (Pain).      cholestyramine-aspartame (CHOLESTYRAMINE LIGHT) 4 gram PwPk Take 1 packet (4 grams of anhydrous cholestyramine total) by mouth daily as needed (Diarrhea).      furosemide (LASIX) 20 MG tablet Take 1 tablet (20 mg total) by mouth daily as needed (In case of swelling, weight gain more than 2 lb overnight).      insulin glargine U-100, Lantus, 100 unit/mL injection Inject 14 Units into the skin every evening.      mirtazapine (REMERON) 15 MG tablet Take 15 mg by mouth every evening.      NOVOLIN R FLEXPEN 100 unit/mL (3 mL) InPn pen       oxyCODONE (ROXICODONE) 10 mg Tab immediate release tablet Take 1 tablet (10 mg total) by mouth every 6 (six) hours as needed for Pain. (Patient not taking: Reported on 4/21/2025) 28 tablet 0

## 2025-05-15 LAB
ANION GAP SERPL CALC-SCNC: 8 MEQ/L
BASOPHILS # BLD AUTO: 0.02 X10(3)/MCL
BASOPHILS NFR BLD AUTO: 0.2 %
BUN SERPL-MCNC: 37.2 MG/DL (ref 9.8–20.1)
CALCIUM SERPL-MCNC: 8.7 MG/DL (ref 8.4–10.2)
CHLORIDE SERPL-SCNC: 104 MMOL/L (ref 98–107)
CO2 SERPL-SCNC: 27 MMOL/L (ref 23–31)
CREAT SERPL-MCNC: 1.67 MG/DL (ref 0.55–1.02)
CREAT/UREA NIT SERPL: 22
EOSINOPHIL # BLD AUTO: 0.31 X10(3)/MCL (ref 0–0.9)
EOSINOPHIL NFR BLD AUTO: 3.6 %
ERYTHROCYTE [DISTWIDTH] IN BLOOD BY AUTOMATED COUNT: 13.6 % (ref 11.5–17)
GFR SERPLBLD CREATININE-BSD FMLA CKD-EPI: 31 ML/MIN/1.73/M2
GLUCOSE SERPL-MCNC: 126 MG/DL (ref 82–115)
HCT VFR BLD AUTO: 27 % (ref 37–47)
HGB BLD-MCNC: 8.4 G/DL (ref 12–16)
IMM GRANULOCYTES # BLD AUTO: 0.03 X10(3)/MCL (ref 0–0.04)
IMM GRANULOCYTES NFR BLD AUTO: 0.3 %
LYMPHOCYTES # BLD AUTO: 1.87 X10(3)/MCL (ref 0.6–4.6)
LYMPHOCYTES NFR BLD AUTO: 21.7 %
MCH RBC QN AUTO: 30.2 PG (ref 27–31)
MCHC RBC AUTO-ENTMCNC: 31.1 G/DL (ref 33–36)
MCV RBC AUTO: 97.1 FL (ref 80–94)
MONOCYTES # BLD AUTO: 0.85 X10(3)/MCL (ref 0.1–1.3)
MONOCYTES NFR BLD AUTO: 9.9 %
NEUTROPHILS # BLD AUTO: 5.53 X10(3)/MCL (ref 2.1–9.2)
NEUTROPHILS NFR BLD AUTO: 64.3 %
NRBC BLD AUTO-RTO: 0 %
PLATELET # BLD AUTO: 233 X10(3)/MCL (ref 130–400)
PMV BLD AUTO: 9.9 FL (ref 7.4–10.4)
POCT GLUCOSE: 135 MG/DL (ref 70–110)
POCT GLUCOSE: 269 MG/DL (ref 70–110)
POCT GLUCOSE: 276 MG/DL (ref 70–110)
POTASSIUM SERPL-SCNC: 4 MMOL/L (ref 3.5–5.1)
RBC # BLD AUTO: 2.78 X10(6)/MCL (ref 4.2–5.4)
SODIUM SERPL-SCNC: 139 MMOL/L (ref 136–145)
WBC # BLD AUTO: 8.61 X10(3)/MCL (ref 4.5–11.5)

## 2025-05-15 PROCEDURE — 21400001 HC TELEMETRY ROOM

## 2025-05-15 PROCEDURE — 25000003 PHARM REV CODE 250: Performed by: EMERGENCY MEDICINE

## 2025-05-15 PROCEDURE — 36415 COLL VENOUS BLD VENIPUNCTURE: CPT | Performed by: HOSPITALIST

## 2025-05-15 PROCEDURE — 85025 COMPLETE CBC W/AUTO DIFF WBC: CPT | Performed by: HOSPITALIST

## 2025-05-15 PROCEDURE — 63600175 PHARM REV CODE 636 W HCPCS: Performed by: INTERNAL MEDICINE

## 2025-05-15 PROCEDURE — 97162 PT EVAL MOD COMPLEX 30 MIN: CPT

## 2025-05-15 PROCEDURE — 97530 THERAPEUTIC ACTIVITIES: CPT

## 2025-05-15 PROCEDURE — 25000003 PHARM REV CODE 250: Performed by: INTERNAL MEDICINE

## 2025-05-15 PROCEDURE — 97166 OT EVAL MOD COMPLEX 45 MIN: CPT

## 2025-05-15 PROCEDURE — 80048 BASIC METABOLIC PNL TOTAL CA: CPT | Performed by: HOSPITALIST

## 2025-05-15 RX ORDER — BISACODYL 10 MG/1
10 SUPPOSITORY RECTAL ONCE
Status: COMPLETED | OUTPATIENT
Start: 2025-05-15 | End: 2025-05-15

## 2025-05-15 RX ORDER — HEPARIN SODIUM 5000 [USP'U]/ML
5000 INJECTION, SOLUTION INTRAVENOUS; SUBCUTANEOUS EVERY 12 HOURS
Status: DISCONTINUED | OUTPATIENT
Start: 2025-05-15 | End: 2025-05-16 | Stop reason: HOSPADM

## 2025-05-15 RX ADMIN — CLOPIDOGREL 75 MG: 75 TABLET ORAL at 08:05

## 2025-05-15 RX ADMIN — CARVEDILOL 12.5 MG: 12.5 TABLET, FILM COATED ORAL at 08:05

## 2025-05-15 RX ADMIN — ATORVASTATIN CALCIUM 40 MG: 40 TABLET, FILM COATED ORAL at 08:05

## 2025-05-15 RX ADMIN — BISACODYL 10 MG: 10 SUPPOSITORY RECTAL at 01:05

## 2025-05-15 RX ADMIN — ISOSORBIDE MONONITRATE 30 MG: 30 TABLET, EXTENDED RELEASE ORAL at 05:05

## 2025-05-15 RX ADMIN — INSULIN ASPART 1 UNITS: 100 INJECTION, SOLUTION INTRAVENOUS; SUBCUTANEOUS at 09:05

## 2025-05-15 RX ADMIN — INSULIN GLARGINE 14 UNITS: 100 INJECTION, SOLUTION SUBCUTANEOUS at 08:05

## 2025-05-15 RX ADMIN — NITROGLYCERIN 0.5 INCH: 20 OINTMENT TOPICAL at 06:05

## 2025-05-15 RX ADMIN — TRAMADOL HYDROCHLORIDE 50 MG: 50 TABLET, COATED ORAL at 01:05

## 2025-05-15 RX ADMIN — SENNOSIDES AND DOCUSATE SODIUM 1 TABLET: 8.6; 5 TABLET ORAL at 08:05

## 2025-05-15 RX ADMIN — HEPARIN SODIUM 5000 UNITS: 5000 INJECTION, SOLUTION INTRAVENOUS; SUBCUTANEOUS at 09:05

## 2025-05-15 RX ADMIN — NITROGLYCERIN 0.5 INCH: 20 OINTMENT TOPICAL at 01:05

## 2025-05-15 RX ADMIN — LISINOPRIL 20 MG: 20 TABLET ORAL at 08:05

## 2025-05-15 RX ADMIN — LEVOTHYROXINE SODIUM 100 MCG: 100 TABLET ORAL at 05:05

## 2025-05-15 RX ADMIN — ACETAMINOPHEN 650 MG: 325 TABLET ORAL at 01:05

## 2025-05-15 RX ADMIN — NITROGLYCERIN 0.5 INCH: 20 OINTMENT TOPICAL at 12:05

## 2025-05-15 RX ADMIN — MIRTAZAPINE 15 MG: 15 TABLET, FILM COATED ORAL at 08:05

## 2025-05-15 RX ADMIN — POLYETHYLENE GLYCOL 3350 17 G: 17 POWDER, FOR SOLUTION ORAL at 08:05

## 2025-05-15 RX ADMIN — NITROGLYCERIN 0.5 INCH: 20 OINTMENT TOPICAL at 05:05

## 2025-05-15 NOTE — PLAN OF CARE
Problem: Occupational Therapy  Goal: Occupational Therapy Goal  Description: Goals to be met by 6/15/25    Pt will complete grooming standing at sink with LRAD with modified independence.  Pt will complete UB dressing with modified independence.  Pt will complete LB dressing with modified independence using LRAD.  Pt will complete toileting with modified independence using LRAD.  Pt will complete functional mobility to/from toilet and toilet transfer with modified independence using LRAD.    Outcome: Progressing

## 2025-05-15 NOTE — PT/OT/SLP EVAL
Occupational Therapy  Evaluation    Name: Gabriela Lozano  MRN: [id number]    Recommendations:     Discharge therapy intensity: Moderate Intensity Therapy   Discharge Equipment Recommendations:  to be determined by next level of care  Barriers to discharge:   (ongoing medical needs; placement)    Assessment:     Gabriela Lozano is a 79 y.o. female with a medical diagnosis of  HTN urgency, pulmonary edema, B/I pleural effusion, hypoxia, Hx of CAD with stents, chest pain, DM2, HLP, HLD.     She presents with the following performance deficits affecting function: weakness, impaired endurance, impaired self care skills, impaired functional mobility, gait instability, impaired balance, impaired cardiopulmonary response to activity.       Patient reports being indep with all ADL tasks except for bathing, in which she receives assist for bathing from a caregiver 1x/week. During OT session, patient appears to be globally deconditioned. She required total A for LB dressing and max A for toileting.     She would benefit from moderate intensity therapy upon discharge to increase in functional endurance and indep with ADL tasks prior to returning home.     Rehab Prognosis: Good; patient would benefit from acute skilled OT services to address these deficits and reach maximum level of function.       Plan:     Patient to be seen 5 x/week to address the above listed problems via self-care/home management, therapeutic activities, therapeutic exercises  Plan of Care Expires: 06/15/25  Plan of Care Reviewed with: patient    Subjective     Chief Complaint: SOB and pain in B knees  Patient/Family Comments/goals: get stronger    Occupational Profile:  Living Environment: lives alone; tub combo with transfer tub bench  Previous level of function: indep with ADLs except for bathing  Roles and Routines: none stated  Equipment Used at Home: walker, rolling, bath bench  Assistance upon Discharge: TBD    Pain/Comfort:  Location -  Side 1: Bilateral  Location - Orientation 1: posterior  Location 1: knee  Pain Addressed 1: Reposition, Distraction, Cessation of Activity, Nurse notified    Patients cultural, spiritual, Cheondoism conflicts given the current situation: no    Objective:     OT communicated with nurse prior to session.      Patient was found up in chair with peripheral IV, telemetry, pulse ox (continuous), oxygen upon OT entry to room.    General Precautions: Standard, fall  Orthopedic Precautions: N/A  Braces: N/A    Vital Signs: Sp02: 1L 96%    Bed Mobility:    Patient completed Sit to Supine with maximal assistance    Functional Mobility/Transfers:  Patient completed Sit <> Stand Transfer with minimum assistance  with  rolling walker   Patient completed Bed <> Chair Transfer using Step Transfer technique with moderate assistance with rolling walker  Functional Mobility: physical cues to manage walker; was not able to take more than ~6 steps    Activities of Daily Living:  Feeding:  independence able to bring cup to mouth; OT had to walk to another floor to get ice for patient as floor's ice machine was broken  Lower Body Dressing: total assistance boaz/doff socks and shoes  Toileting: maximal assistance purewick replaced at end of session    AMPAC Total Score: 14    Functional Cognition:  Intact    Visual Perceptual Skills:  Intact    Upper Extremity Function:  Right Upper Extremity:   Generally weak; ROM WFL    Left Upper Extremity:  Generally weak; ROM WFL    Therapeutic Positioning  Risk for acquired pressure injuries is increased due to relative decrease in mobility d/t hospitalization .    OT interventions performed during the course of today's session:   Education was provided on benefits of and recommendations for therapeutic positioning    Skin assessment: full body skin assessment was performed    Findings: no redness or breakdown noted    OT recommendations for therapeutic positioning throughout hospitalization:    Follow [facility] Pressure Injury Prevention Protocol  Geomat recommended for sacral protection while UIC    Patient Education:  Patient provided with verbal education education regarding OT role/goals/POC, fall prevention, safety awareness, Discharge/DME recommendations, and pressure ulcer prevention.  Understanding was verbalized, however additional teaching warranted.     Patient left HOB elevated with all lines intact, call button in reach, and nurse notified.    GOALS:   Multidisciplinary Problems       Occupational Therapy Goals          Problem: Occupational Therapy    Goal Priority Disciplines Outcome Interventions   Occupational Therapy Goal     OT, PT/OT Progressing    Description: Goals to be met by 6/15/25    Pt will complete grooming standing at sink with LRAD with modified independence.  Pt will complete UB dressing with modified independence.  Pt will complete LB dressing with modified independence using LRAD.  Pt will complete toileting with modified independence using LRAD.  Pt will complete functional mobility to/from toilet and toilet transfer with modified independence using LRAD.                         History:     Past Medical History:   Diagnosis Date    CAD (coronary artery disease)     Diabetes mellitus     Hypertension     Hypothyroidism, unspecified          Past Surgical History:   Procedure Laterality Date     SECTION      CORONARY ANGIOPLASTY WITH STENT PLACEMENT      HYSTERECTOMY      RETROGRADE INTRAMEDULLARY RODDING OF DISTAL FEMUR Left 2024    Procedure: INSERTION, INTRAMEDULLARY MARCOS, FEMUR, DISTAL, RETROGRADE;  Surgeon: Godwin Teixeira MD;  Location: [facility];  Service: Orthopedics;  Laterality: Left;       Time Tracking:     OT Date of Treatment:    OT Start Time:   OT Stop Time: 144  OT Total Time (min): 29 min    Billable Minutes:Evaluation mod    5/15/2025

## 2025-05-15 NOTE — PLAN OF CARE
Problem: Physical Therapy  Goal: Physical Therapy Goal  Description: Goals to be met by: 6/15/2025     Patient will increase functional independence with mobility by performin. Supine to sit with Woodruff  2. Sit to supine with Woodruff  3. Sit to stand transfer with Modified Woodruff  4. Bed to chair transfer with Modified Woodruff using Rolling Walker  5. Gait  x 100 feet with Stand-by Assistance using Rolling Walker.     Outcome: Progressing

## 2025-05-15 NOTE — PLAN OF CARE
Spoke to Ailya Renee [facility], pt is 3 days short of her 60 day wellness period. Son arron made aware, would like to see if she can admit to rehab, foc signed, referral sent to [facility].

## 2025-05-15 NOTE — PLAN OF CARE
Pasrr complete, NO level ll required    Snf choices printed, foc signed, referral sent to [facility]

## 2025-05-15 NOTE — PT/OT/SLP EVAL
Physical Therapy Evaluation    Patient Name:  Gabriela Lozano   MRN:  [id number]    Recommendations:     Discharge therapy intensity: Moderate Intensity Therapy   Discharge Equipment Recommendations: to be determined by next level of care   Barriers to discharge: Decreased caregiver support and Ongoing medical needs    Assessment:     Gabriela Lozano is a 79 y.o. female admitted with a medical diagnosis of HTN urgency, pulmonary edema, B/I pleural effusion, hypoxia, Hx of CAD with stents, chest pain, DM2, HLP, HL:D.  She presents with the following impairments/functional limitations: weakness, impaired balance, impaired endurance, pain, impaired self care skills, impaired functional mobility, gait instability, decreased lower extremity function.    Attempted to use  x3 during session. Connection was lost during each attempt. Limited history obtained at this time due to connection issues. Pt able to understand & speak minimal English. Pt reporting that she lives alone and uses RW. She currently has home health physical therapy. Pt able to follow most commands after demonstration and hand gestures. Pt req Min A for supine to sit, Min A for sit to stand transfer, and Mod A for stand step transfer (2ft) from bed to chair. Pt demo antalgic pattern during transfer secondary posterior calf pain. Recommending moderate intensity in order for patient to improve LE strength and independence prior to going home.       Rehab Prognosis: Good; patient would benefit from acute skilled PT services to address these deficits and reach maximum level of function.    Recent Surgery: * No surgery found *      Plan:     During this hospitalization, patient would benefit from acute PT services 5 x/week to address the identified rehab impairments via gait training, therapeutic activities, therapeutic exercises and progress toward the following goals:    Plan of Care Expires:  06/16/25    Subjective     Chief Complaint:   dizziness and leg pain   Patient/Family Comments/goals: return home  Pain/Comfort:  Pain Rating 1:  (c/o posterior calf pain)    Patients cultural, spiritual, Hinduism conflicts given the current situation: no    Living Environment:  Pt lives alone in an apartment.   Prior to admission, patients level of function was Mod I using RW.  Equipment used at home: walker, rolling, shower chair.  DME owned (not currently used): none.  Upon discharge, patient will have assistance from (none).    Objective:     Communicated with nsg prior to session.  Patient found HOB elevated with PureWick, pulse ox (continuous), telemetry  upon PT entry to room.    General Precautions: Standard, fall  Orthopedic Precautions:N/A   Braces: N/A  Respiratory Status: Nasal cannula, flow 1 L/min, at rest 99% SpO  Blood Pressure: NT      Exams:  BLE ROM: WFL  BLE Strength: 3-/5 hip flexor, 3+/5 knee extensor, 3+/5 PF/DF  Skin integrity: Visible skin intact      Functional Mobility:  Bed Mobility:     Supine to Sit: minimum assistance  Transfers:     Sit to Stand:  minimum assistance with rolling walker  Stand step transfer from bed to chair: Mod A with RW   2ft total   Pt demo antalgic pattern, forward flexed trunk, and unsteadiness. No major LOB.       AM-PAC 6 CLICK MOBILITY  Total Score:18       Treatment & Education:      Patient provided with verbal education education regarding PT role/goals/POC, fall prevention, safety awareness, and discharge/DME recommendations.  Additional teaching is warranted.     Patient left up in chair with all lines intact, call button in reach, and nsg notified.    GOALS:   Multidisciplinary Problems       Physical Therapy Goals          Problem: Physical Therapy    Goal Priority Disciplines Outcome Interventions   Physical Therapy Goal     PT, PT/OT Progressing    Description: Goals to be met by: 6/15/2025     Patient will increase functional independence with mobility by performin. Supine to sit with  [place]  2. Sit to supine with [place]  3. Sit to stand transfer with Modified [place]  4. Bed to chair transfer with Modified [place] using Rolling Walker  5. Gait  x 100 feet with Stand-by Assistance using Rolling Walker.                          History:     Past Medical History:   Diagnosis Date    CAD (coronary artery disease)     Diabetes mellitus     Hypertension     Hypothyroidism, unspecified        Past Surgical History:   Procedure Laterality Date     SECTION      CORONARY ANGIOPLASTY WITH STENT PLACEMENT      HYSTERECTOMY      RETROGRADE INTRAMEDULLARY RODDING OF DISTAL FEMUR Left 2024    Procedure: INSERTION, INTRAMEDULLARY MARCOS, FEMUR, DISTAL, RETROGRADE;  Surgeon: Godwin Teixeira MD;  Location: [facility];  Service: Orthopedics;  Laterality: Left;       Time Tracking:     PT Received On: 05/15/25  PT Start Time: 0942     PT Stop Time: 1014  PT Total Time (min): 32 min     Billable Minutes: Evaluation Mod and Therapeutic Activity 15      05/15/2025

## 2025-05-15 NOTE — PROGRESS NOTES
[facility]  Hospital Medicine Progress Note        Chief Complaint: Inpatient Follow-up    HPI:   79-year-old  female with significant history of CAD status post PCI to LAD, RCA in 2017, HTN, HLD, hypothyroidism, type 2 diabetes mellitus, morbid obesity presented to the ED with complaints of progressive dyspnea, chest pressure.  Further workup in the ED revealed elevated BNP, anemia.  CTA chest with concerns for bilateral effusion/some pulmonary edema.  Patient was significantly hypertensive and hypoxemic.  Patient initially presented to outlying facility and was transferred to our hospital for admission under hospital medicine services.  Cardiology services consulted for decompensated heart failure, echocardiogram was ordered, diuretics initiated.  Echocardiogram with normal systolic and diastolic function, trace valvular heart disease noted.  Cardiology evaluated and recommended to continue diuretics.  Patient is symptomatically improved on diuretics, oxygen status improved.  Cardiology recommended to continue goal-directed medical treatment and follow up outpatient, cardiology signed off, given physical deconditioning therapy services consulted.  Also given worsening acute kidney injury diuretics discontinued.    Interval Hx:   Patient seen at bedside, comfortably sitting in the couch, complaining of bilateral calf pain, otherwise denies any specific complaints, saturations high 90s on 2 L nasal cannula.  No chest pain, dyspnea better    Objective/physical exam:  General: In no acute distress, afebrile  Chest: Clear to auscultation bilaterally  Heart: S1, S2, no appreciable murmur  Abdomen: Soft, nontender, BS +  MSK: Warm, no lower extremity edema, no clubbing or cyanosis  Neurologic: Alert and oriented x4,   VITAL SIGNS: 24 HRS MIN & MAX LAST   Temp  Min: 97.4 °F (36.3 °C)  Max: 98.5 °F (36.9 °C) 97.4 °F (36.3 °C)   BP  Min: 110/42  Max: 141/61 (!) 141/61   Pulse  Min: 67   Max: 78  70   Resp  Min: 17  Max: 19 17   SpO2  Min: 94 %  Max: 98 % 97 %       Recent Labs   Lab 05/15/25  0500   WBC 8.61   RBC 2.78*   HGB 8.4*   HCT 27.0*   MCV 97.1*   MCH 30.2   MCHC 31.1*   RDW 13.6      MPV 9.9         Recent Labs   Lab 05/13/25  0520 05/14/25  0438 05/15/25  0500      < > 139   K 4.0   < > 4.0      < > 104   CO2 24   < > 27   BUN 29.0*   < > 37.2*   CREATININE 1.33*   < > 1.67*   *   < > 126*   CALCIUM 9.3   < > 8.7   ALBUMIN 3.3*  --   --    PROT 6.2  --   --    ALKPHOS 78  --   --    ALT 9  --   --    AST 13  --   --    BILITOT 0.4  --   --     < > = values in this interval not displayed.          Microbiology Results (last 7 days)       ** No results found for the last 168 hours. **             Scheduled Med:   atorvastatin  40 mg Oral Daily    carvediloL  12.5 mg Oral BID    clopidogreL  75 mg Oral Daily    insulin glargine U-100 (Lantus)  14 Units Subcutaneous Daily    isosorbide mononitrate  30 mg Oral QAM    levothyroxine  100 mcg Oral Before breakfast    lisinopriL  20 mg Oral Daily    mirtazapine  15 mg Oral QHS    nitroGLYCERIN 2% TD oint  0.5 inch Topical (Top) Q6H    polyethylene glycol  17 g Oral Daily    senna-docusate  1 tablet Oral BID          Assessment/Plan:    Acute pulmonary edema  Acute hypoxemic respiratory failure secondary to above-improved   Poorly-controlled HTN/hypertensive urgency on admit-improved   Mickey on CKD, stage III secondary to over-diuresis   Bilateral small pleural effusions  Anemia of chronic disease  Physical deconditioning   History of CAD status post PCI x2 in 2017   HLD   Hypothyroidism   Type 2 diabetes mellitus   Morbid obesity  Prophylaxis      Respiratory status/volume status much better  off diuretics given worsening Mickey I   Continue to monitor rest status and renal function closely   Cardiology signed off  Continue Plavix, high-intensity statin, Coreg, Imdur, lisinopril  DC nitro, no more chest pain  BP is now at  goal  Hemoglobin stable, no overt bleeding   Outpatient follow up for further workup for anemia   FOBT is pending, in fact is positive will consult GI in-house  Lantus and sliding scale for type 2 diabetes mellitus   Make adjustments as far as Lantus dosing based on subsequent CBG   Continue bowel regimen   Continue other home meds-levothyroxine, Remeron   DVT prophylaxis-initiate subQ heparin      Case management consulted for skilled nursing facility placement      Orquidea Reese MD   05/15/2025

## 2025-05-16 VITALS
SYSTOLIC BLOOD PRESSURE: 150 MMHG | HEIGHT: 59 IN | WEIGHT: 171.94 LBS | RESPIRATION RATE: 18 BRPM | OXYGEN SATURATION: 94 % | BODY MASS INDEX: 34.66 KG/M2 | DIASTOLIC BLOOD PRESSURE: 75 MMHG | HEART RATE: 73 BPM | TEMPERATURE: 98 F

## 2025-05-16 LAB
ALBUMIN SERPL-MCNC: 3 G/DL (ref 3.4–4.8)
ALBUMIN/GLOB SERPL: 1.1 RATIO (ref 1.1–2)
ALP SERPL-CCNC: 71 UNIT/L (ref 40–150)
ALT SERPL-CCNC: 7 UNIT/L (ref 0–55)
ANION GAP SERPL CALC-SCNC: 10 MEQ/L
AST SERPL-CCNC: 12 UNIT/L (ref 11–45)
BASOPHILS # BLD AUTO: 0.02 X10(3)/MCL
BASOPHILS NFR BLD AUTO: 0.2 %
BILIRUB SERPL-MCNC: 0.3 MG/DL
BUN SERPL-MCNC: 37.6 MG/DL (ref 9.8–20.1)
CALCIUM SERPL-MCNC: 8.8 MG/DL (ref 8.4–10.2)
CHLORIDE SERPL-SCNC: 102 MMOL/L (ref 98–107)
CO2 SERPL-SCNC: 27 MMOL/L (ref 23–31)
COLOR STL: NORMAL
CONSISTENCY STL: NORMAL
CREAT SERPL-MCNC: 1.35 MG/DL (ref 0.55–1.02)
CREAT/UREA NIT SERPL: 28
EOSINOPHIL # BLD AUTO: 0.31 X10(3)/MCL (ref 0–0.9)
EOSINOPHIL NFR BLD AUTO: 3.5 %
ERYTHROCYTE [DISTWIDTH] IN BLOOD BY AUTOMATED COUNT: 13.3 % (ref 11.5–17)
GFR SERPLBLD CREATININE-BSD FMLA CKD-EPI: 40 ML/MIN/1.73/M2
GLOBULIN SER-MCNC: 2.8 GM/DL (ref 2.4–3.5)
GLUCOSE SERPL-MCNC: 184 MG/DL (ref 82–115)
HCT VFR BLD AUTO: 27.6 % (ref 37–47)
HEMOCCULT SP1 STL QL: NEGATIVE
HGB BLD-MCNC: 8.6 G/DL (ref 12–16)
IMM GRANULOCYTES # BLD AUTO: 0.04 X10(3)/MCL (ref 0–0.04)
IMM GRANULOCYTES NFR BLD AUTO: 0.5 %
LYMPHOCYTES # BLD AUTO: 1.79 X10(3)/MCL (ref 0.6–4.6)
LYMPHOCYTES NFR BLD AUTO: 20.5 %
MCH RBC QN AUTO: 29.9 PG (ref 27–31)
MCHC RBC AUTO-ENTMCNC: 31.2 G/DL (ref 33–36)
MCV RBC AUTO: 95.8 FL (ref 80–94)
MONOCYTES # BLD AUTO: 0.81 X10(3)/MCL (ref 0.1–1.3)
MONOCYTES NFR BLD AUTO: 9.3 %
NEUTROPHILS # BLD AUTO: 5.77 X10(3)/MCL (ref 2.1–9.2)
NEUTROPHILS NFR BLD AUTO: 66 %
NRBC BLD AUTO-RTO: 0 %
PLATELET # BLD AUTO: 248 X10(3)/MCL (ref 130–400)
PMV BLD AUTO: 10 FL (ref 7.4–10.4)
POCT GLUCOSE: 285 MG/DL (ref 70–110)
POTASSIUM SERPL-SCNC: 3.9 MMOL/L (ref 3.5–5.1)
PROT SERPL-MCNC: 5.8 GM/DL (ref 5.8–7.6)
RBC # BLD AUTO: 2.88 X10(6)/MCL (ref 4.2–5.4)
SODIUM SERPL-SCNC: 139 MMOL/L (ref 136–145)
WBC # BLD AUTO: 8.74 X10(3)/MCL (ref 4.5–11.5)

## 2025-05-16 PROCEDURE — 97116 GAIT TRAINING THERAPY: CPT | Mod: CQ

## 2025-05-16 PROCEDURE — 25000003 PHARM REV CODE 250: Performed by: INTERNAL MEDICINE

## 2025-05-16 PROCEDURE — 25000003 PHARM REV CODE 250: Performed by: EMERGENCY MEDICINE

## 2025-05-16 PROCEDURE — 25000003 PHARM REV CODE 250: Performed by: NURSE PRACTITIONER

## 2025-05-16 PROCEDURE — 82272 OCCULT BLD FECES 1-3 TESTS: CPT | Performed by: INTERNAL MEDICINE

## 2025-05-16 PROCEDURE — 63600175 PHARM REV CODE 636 W HCPCS: Performed by: INTERNAL MEDICINE

## 2025-05-16 PROCEDURE — 85025 COMPLETE CBC W/AUTO DIFF WBC: CPT | Performed by: INTERNAL MEDICINE

## 2025-05-16 PROCEDURE — 80053 COMPREHEN METABOLIC PANEL: CPT | Performed by: INTERNAL MEDICINE

## 2025-05-16 PROCEDURE — 36415 COLL VENOUS BLD VENIPUNCTURE: CPT | Performed by: INTERNAL MEDICINE

## 2025-05-16 RX ORDER — LISINOPRIL 20 MG/1
30 TABLET ORAL DAILY
Start: 2025-05-16

## 2025-05-16 RX ORDER — HUMAN INSULIN 100 [IU]/ML
1-10 INJECTION, SOLUTION SUBCUTANEOUS 3 TIMES DAILY PRN
Start: 2025-05-16

## 2025-05-16 RX ORDER — CARVEDILOL 12.5 MG/1
12.5 TABLET ORAL 2 TIMES DAILY
Start: 2025-05-16

## 2025-05-16 RX ORDER — DIPHENHYDRAMINE HCL 25 MG
25 CAPSULE ORAL EVERY 6 HOURS PRN
Status: DISCONTINUED | OUTPATIENT
Start: 2025-05-16 | End: 2025-05-16 | Stop reason: HOSPADM

## 2025-05-16 RX ORDER — INSULIN GLARGINE 100 [IU]/ML
18 INJECTION, SOLUTION SUBCUTANEOUS NIGHTLY
Start: 2025-05-16 | End: 2025-06-15

## 2025-05-16 RX ADMIN — HEPARIN SODIUM 5000 UNITS: 5000 INJECTION, SOLUTION INTRAVENOUS; SUBCUTANEOUS at 08:05

## 2025-05-16 RX ADMIN — ACETAMINOPHEN 650 MG: 325 TABLET ORAL at 11:05

## 2025-05-16 RX ADMIN — ISOSORBIDE MONONITRATE 30 MG: 30 TABLET, EXTENDED RELEASE ORAL at 06:05

## 2025-05-16 RX ADMIN — INSULIN GLARGINE 14 UNITS: 100 INJECTION, SOLUTION SUBCUTANEOUS at 08:05

## 2025-05-16 RX ADMIN — POLYETHYLENE GLYCOL 3350 17 G: 17 POWDER, FOR SOLUTION ORAL at 08:05

## 2025-05-16 RX ADMIN — SENNOSIDES AND DOCUSATE SODIUM 1 TABLET: 8.6; 5 TABLET ORAL at 08:05

## 2025-05-16 RX ADMIN — LISINOPRIL 20 MG: 20 TABLET ORAL at 08:05

## 2025-05-16 RX ADMIN — CLOPIDOGREL 75 MG: 75 TABLET ORAL at 08:05

## 2025-05-16 RX ADMIN — CARVEDILOL 12.5 MG: 12.5 TABLET, FILM COATED ORAL at 08:05

## 2025-05-16 RX ADMIN — ATORVASTATIN CALCIUM 40 MG: 40 TABLET, FILM COATED ORAL at 08:05

## 2025-05-16 RX ADMIN — DIPHENHYDRAMINE HYDROCHLORIDE 25 MG: 25 CAPSULE ORAL at 01:05

## 2025-05-16 RX ADMIN — LEVOTHYROXINE SODIUM 100 MCG: 100 TABLET ORAL at 06:05

## 2025-05-16 NOTE — PLAN OF CARE
Problem: Adult Inpatient Plan of Care  Goal: Plan of Care Review  Outcome: Met  Goal: Patient-Specific Goal (Individualized)  Outcome: Met  Goal: Absence of Hospital-Acquired Illness or Injury  Outcome: Met  Goal: Optimal Comfort and Wellbeing  Outcome: Met  Goal: Readiness for Transition of Care  Outcome: Met     Problem: Acute Kidney Injury/Impairment  Goal: Fluid and Electrolyte Balance  Outcome: Met  Goal: Improved Oral Intake  Outcome: Met  Goal: Effective Renal Function  Outcome: Met     Problem: Diabetes Comorbidity  Goal: Blood Glucose Level Within Targeted Range  Outcome: Met     Problem: Fall Injury Risk  Goal: Absence of Fall and Fall-Related Injury  Outcome: Met

## 2025-05-16 NOTE — DISCHARGE SUMMARY
[facility] Hospital Medicine Discharge Summary    Admit Date: 5/12/2025  Discharge Date and Time: 5/16/202510:32 AM  Admitting Physician: ALLIE Team  Discharging Physician: Orquidea Reese MD.  Primary Care Physician: No primary care provider on file.      Discharge Diagnoses:  Acute pulmonary edema  Acute hypoxemic respiratory failure secondary to above-improved   Poorly-controlled HTN/hypertensive urgency on admit-improved   Mickey on CKD, stage III secondary to over-diuresis   Bilateral small pleural effusions  Anemia of chronic disease  Physical deconditioning   History of CAD status post PCI x2 in 2017   HLD   Hypothyroidism   Type 2 diabetes mellitus   Morbid obesity    Hospital Course:     79-year-old  female with significant history of CAD status post PCI to LAD, RCA in 2017, HTN, HLD, hypothyroidism, type 2 diabetes mellitus, morbid obesity presented to the ED with complaints of progressive dyspnea, chest pressure.  Further workup in the ED revealed elevated BNP, anemia.  CTA chest with concerns for bilateral effusion/some pulmonary edema.  Patient was significantly hypertensive and hypoxemic.  Patient initially presented to outlying facility and was transferred to our hospital for admission under hospital medicine services.  Cardiology services consulted for decompensated heart failure, echocardiogram was ordered, diuretics initiated.  Echocardiogram with normal systolic and diastolic function, trace valvular heart disease noted.  Cardiology evaluated and recommended to continue diuretics.  Patient is symptomatically improved on diuretics, oxygen status improved.  Cardiology recommended to continue goal-directed medical treatment and follow up outpatient, cardiology signed off, given physical deconditioning therapy services consulted.  Also given worsening acute kidney injury diuretics discontinued.  Respiratory status and volume status much better as of 5/15, off diuretics  given worsening Mikcey I, cardiology signed off, on GDM T as tolerated, BP at goal, hemoglobin stable, outpatient follow up for further workup for anemia, FOBT was ordered and plan to consult GI in-house if it comes back positive, Lantus and sliding scale for type 2 diabetes mellitus.  Therapy Services evaluated and they recommended skilled nursing facility placement, case management consulted for the same, patient was re-evaluated on 5/16, labs stable, hemodynamics stable, FOBT came back negative.  Patient was accepted to rehab on 05/16 and therefore was discharged in stable condition to rehab on 05/16, discharge medications per med rec, patient will need follow up with PCP, Cardiology.    Vitals:  VITAL SIGNS: 24 HRS MIN & MAX LAST   Temp  Min: 98 °F (36.7 °C)  Max: 98.7 °F (37.1 °C) 98.3 °F (36.8 °C)   BP  Min: 133/51  Max: 162/71 (!) 155/70   Pulse  Min: 79  Max: 84  84   Resp  Min: 18  Max: 22 18   SpO2  Min: 93 %  Max: 98 % 96 %       Physical Exam:  General appearance:  No acute distress  HENT: Atraumatic   Lungs: Clear to auscultation bilaterally.   Heart: RRR,No edema  Abdomen: Soft, non tender   Extremities: warm  Neuro:  Awake, alert, oriented x4  Psych/mental status: Appropriate mood and affect.      Procedures Performed: No admission procedures for hospital encounter.     Significant Diagnostic Studies: See Full reports for all details    Recent Labs   Lab 05/14/25  0438 05/15/25  0500 05/16/25  0501   WBC 7.71 8.61 8.74   RBC 2.52* 2.78* 2.88*   HGB 7.5* 8.4* 8.6*   HCT 24.2* 27.0* 27.6*   MCV 96.0* 97.1* 95.8*   MCH 29.8 30.2 29.9   MCHC 31.0* 31.1* 31.2*   RDW 13.6 13.6 13.3    233 248   MPV 10.1 9.9 10.0       Recent Labs   Lab 05/12/25  1030 05/13/25  0520 05/14/25  0438 05/15/25  0500 05/16/25  0501    138 140 139 139   K 4.3 4.0 4.0 4.0 3.9   * 104 104 104 102   CO2 23 24 26 27 27   BUN 22.2* 29.0* 36.1* 37.2* 37.6*   CREATININE 1.30* 1.33* 1.76* 1.67* 1.35*   * 268* 132*  126* 184*   CALCIUM 9.5 9.3 8.6 8.7 8.8   ALBUMIN 3.5 3.3*  --   --  3.0*   PROT 6.9 6.2  --   --  5.8   ALKPHOS 77 78  --   --  71   ALT 8 9  --   --  7   AST 16 13  --   --  12   BILITOT 0.5 0.4  --   --  0.3        Microbiology Results (last 7 days)       ** No results found for the last 168 hours. **             Echo    Left Ventricle: The left ventricle is normal in size. Normal wall   thickness. There is normal systolic function with a visually estimated   ejection fraction of 60 - 65%. There is normal diastolic function.    Right Ventricle: Systolic function is normal.    Mitral Valve: The mitral valve is structurally normal.    Tricuspid Valve: There is trace regurgitation.    Pulmonary Artery: The estimated pulmonary artery systolic pressure is   22 mmHg.    Pericardium: There is no pericardial effusion.         Medication List        CHANGE how you take these medications      carvediloL 12.5 MG tablet  Commonly known as: COREG  Take 1 tablet (12.5 mg total) by mouth 2 (two) times daily.  What changed:   medication strength  how much to take     insulin glargine U-100 (Lantus) 100 unit/mL injection  Inject 18 Units into the skin every evening. Increase by 2 units every other night until fasting CBG is persistently below 120  What changed:   how much to take  additional instructions     levothyroxine 100 MCG tablet  Commonly known as: SYNTHROID  What changed: Another medication with the same name was removed. Continue taking this medication, and follow the directions you see here.     NovoLIN R FlexPen 100 unit/mL (3 mL) Inpn pen  Generic drug: insulin regular human  Inject 1-10 Units into the skin 3 (three) times daily as needed (Hyperglycemia).  What changed: See the new instructions.            CONTINUE taking these medications      acetaminophen 650 MG Tbsr  Commonly known as: TYLENOL  Take 1 tablet (650 mg total) by mouth every 8 (eight) hours as needed (Pain).     atorvastatin 40 MG tablet  Commonly  known as: LIPITOR     cholestyramine-aspartame 4 gram Pwpk  Commonly known as: CHOLESTYRAMINE LIGHT  Take 1 packet (4 grams of anhydrous cholestyramine total) by mouth daily as needed (Diarrhea).     clopidogreL 75 mg tablet  Commonly known as: PLAVIX     furosemide 20 MG tablet  Commonly known as: LASIX  Take 1 tablet (20 mg total) by mouth daily as needed (In case of swelling, weight gain more than 2 lb overnight).     gabapentin 600 MG tablet  Commonly known as: NEURONTIN  Take 0.5 tablets (300 mg total) by mouth 3 (three) times daily.     isosorbide mononitrate 30 MG 24 hr tablet  Commonly known as: IMDUR     lisinopriL 20 MG tablet  Commonly known as: PRINIVIL,ZESTRIL     mirtazapine 15 MG tablet  Commonly known as: REMERON            STOP taking these medications      metFORMIN 1000 MG tablet  Commonly known as: GLUCOPHAGE     oxyCODONE 10 mg Tab immediate release tablet  Commonly known as: ROXICODONE     traMADoL 50 mg tablet  Commonly known as: ULTRAM               Where to Get Your Medications        Information about where to get these medications is not yet available    Ask your nurse or doctor about these medications  carvediloL 12.5 MG tablet  insulin glargine U-100 (Lantus) 100 unit/mL injection  NovoLIN R FlexPen 100 unit/mL (3 mL) Inpn pen          Explained in detail to the patient about the discharge plan, medications, and follow-up visits. Pt understands and agrees with the treatment plan  Discharge Disposition: Home or Self Care   Discharged Condition: stable  Diet-   Dietary Orders (From admission, onward)       Start     Ordered    05/13/25 1442  Diet Heart Healthy Consistent Carbohydrate; 2000 Calories (up to 75 gm per meal)  (Diet/Nutrition - [facility] Loc[facility])  Diet effective now        Question Answer Comment   Diet Modifier: Consistent Carbohydrate    Total calories / carbs: 2000 Calories (up to 75 gm per meal)        05/13/25 1441    05/13/25 1442  Dietary nutrition supplements All Meals;  Boost Glucose Control - Chocolate  Continuous        Question Answer Comment   Frequency: All Meals    Select PO Supplement: Boost Glucose Control - Chocolate        05/13/25 1442                   Medications Per DC med rec  Activities as tolerated   Follow-up Information       Dr. Chatterjee in 1 week Follow up.               PCP in 1 week Follow up.                           For further questions contact hospitalist office    Discharge time 33 minutes    For worsening symptoms, chest pain, shortness of breath, increased abdominal pain, high grade fever, stroke or stroke like symptoms, immediately go to the nearest Emergency Room or call 911 as soon as possible.      Orquidea Mendez M.D on 5/16/2025. at 10:32 AM.

## 2025-05-16 NOTE — PLAN OF CARE
05/16/25 0855   Discharge Reassessment   Assessment Type Discharge Planning Reassessment   Did the patient's condition or plan change since previous assessment? Yes   Discharge Plan discussed with: Patient;Adult children   Discharge Plan A Rehab   Discharge Plan B Home;Home Health   DME Needed Upon Discharge  none   Transition of Care Barriers None   Post-Acute Status   Post-Acute Authorization Placement   Post-Acute Placement Status Set-up Complete/Auth obtained   Discharge Delays (!) Ambulance Transport/Facility Transport

## 2025-05-16 NOTE — PT/OT/SLP PROGRESS
Physical Therapy Treatment    Patient Name:  Gabriela Lozano   MRN:  [id number]    Recommendations:     Discharge therapy intensity: Moderate Intensity Therapy   Discharge Equipment Recommendations: to be determined by next level of care  Barriers to discharge: Decreased caregiver support, Impaired mobility, and Ongoing medical needs    Assessment:     Gabriela Lozano is a 79 y.o. female admitted with a medical diagnosis of HTN urgency, pulmonary edema, B/I pleural effusion, hypoxia, Hx of CAD with stents, chest pain, DM2, HLP, HL:D.  She presents with the following impairments/functional limitations: weakness, impaired balance, impaired endurance, pain, impaired self care skills, impaired functional mobility, gait instability, decreased lower extremity function.       Rehab Prognosis: Good; patient would benefit from acute skilled PT services to address these deficits and reach maximum level of function.    Recent Surgery: * No surgery found *      Plan:     During this hospitalization, patient would benefit from acute PT services 5 x/week to address the identified rehab impairments via gait training, therapeutic activities, therapeutic exercises and progress toward the following goals:    Plan of Care Expires:  06/16/25    Subjective     Chief Complaint: LE pain    Objective:     Communicated with nurse prior to session.  Patient found supine with PureWick, pulse ox (continuous), telemetry upon PT entry to room.     General Precautions: Standard, fall  Orthopedic Precautions: N/A  Braces: N/A  Respiratory Status: Nasal cannula, flow 1 L/min  Blood Pressure:   Skin Integrity: Visible skin intact      Functional Mobility:  Min assist to get EOB and SBA STS  Gait 65 ft x 2 RW SBA. SPO2 >92% so nurse ok'd RA.     Education Provided:  Role and goals of PT, transfer training, bed mobility, gait training, balance training, safety awareness, assistive device, strengthening exercises, and importance of  participating in PT to return to PLOF.     Patient left up in chair with call button in reach    GOALS:   Multidisciplinary Problems       Physical Therapy Goals          Problem: Physical Therapy    Goal Priority Disciplines Outcome Interventions   Physical Therapy Goal     PT, PT/OT Progressing    Description: Goals to be met by: 6/15/2025     Patient will increase functional independence with mobility by performin. Supine to sit with [place]  2. Sit to supine with [place]  3. Sit to stand transfer with Modified [place]  4. Bed to chair transfer with Modified [place] using Rolling Walker  5. Gait  x 100 feet with Stand-by Assistance using Rolling Walker.                          Time Tracking:     Billable Minutes: Gait Training 24    Treatment Type: Treatment  PT/PTA: PTA     Number of PTA visits since last PT visit: 1     2025

## 2025-05-16 NOTE — PLAN OF CARE
05/16/25 1348   Final Note   Assessment Type Final Discharge Note   Anticipated Discharge Disposition Rehab   Hospital Resources/Appts/Education Provided Appointments scheduled and added to AVS   Post-Acute Status   Discharge Delays None known at this time

## 2025-07-15 ENCOUNTER — HOSPITAL ENCOUNTER (INPATIENT)
Facility: HOSPITAL | Age: 80
LOS: 4 days | Discharge: HOME | DRG: 377 | End: 2025-07-19
Attending: STUDENT IN AN ORGANIZED HEALTH CARE EDUCATION/TRAINING PROGRAM | Admitting: INTERNAL MEDICINE
Payer: MEDICARE

## 2025-07-15 DIAGNOSIS — G89.4 CHRONIC PAIN SYNDROME: ICD-10-CM

## 2025-07-15 DIAGNOSIS — R10.13 EPIGASTRIC PAIN: ICD-10-CM

## 2025-07-15 DIAGNOSIS — R53.1 WEAKNESS: ICD-10-CM

## 2025-07-15 DIAGNOSIS — R07.9 CHEST PAIN: ICD-10-CM

## 2025-07-15 DIAGNOSIS — D62 ACUTE BLOOD LOSS ANEMIA: ICD-10-CM

## 2025-07-15 DIAGNOSIS — R79.89 ELEVATED TROPONIN: ICD-10-CM

## 2025-07-15 DIAGNOSIS — I21.4 NSTEMI (NON-ST ELEVATED MYOCARDIAL INFARCTION): Primary | ICD-10-CM

## 2025-07-15 LAB
ALBUMIN SERPL-MCNC: 3.1 G/DL (ref 3.4–4.8)
ALBUMIN/GLOB SERPL: 0.9 RATIO (ref 1.1–2)
ALP SERPL-CCNC: 72 UNIT/L (ref 40–150)
ALT SERPL-CCNC: 11 UNIT/L (ref 0–55)
ANION GAP SERPL CALC-SCNC: 9 MEQ/L
AST SERPL-CCNC: 14 UNIT/L (ref 11–45)
BASOPHILS # BLD AUTO: 0.03 X10(3)/MCL
BASOPHILS NFR BLD AUTO: 0.3 %
BILIRUB SERPL-MCNC: 0.5 MG/DL
BNP BLD-MCNC: 364.6 PG/ML
BUN SERPL-MCNC: 43.3 MG/DL (ref 9.8–20.1)
CALCIUM SERPL-MCNC: 9.3 MG/DL (ref 8.4–10.2)
CHLORIDE SERPL-SCNC: 101 MMOL/L (ref 98–107)
CO2 SERPL-SCNC: 21 MMOL/L (ref 23–31)
CREAT SERPL-MCNC: 1.72 MG/DL (ref 0.55–1.02)
CREAT/UREA NIT SERPL: 25
EOSINOPHIL # BLD AUTO: 0.05 X10(3)/MCL (ref 0–0.9)
EOSINOPHIL NFR BLD AUTO: 0.4 %
ERYTHROCYTE [DISTWIDTH] IN BLOOD BY AUTOMATED COUNT: 12.6 % (ref 11.5–17)
GFR SERPLBLD CREATININE-BSD FMLA CKD-EPI: 30 ML/MIN/1.73/M2
GLOBULIN SER-MCNC: 3.3 GM/DL (ref 2.4–3.5)
GLUCOSE SERPL-MCNC: 270 MG/DL (ref 82–115)
HCT VFR BLD AUTO: 31.4 % (ref 37–47)
HGB BLD-MCNC: 9.8 G/DL (ref 12–16)
IMM GRANULOCYTES # BLD AUTO: 0.04 X10(3)/MCL (ref 0–0.04)
IMM GRANULOCYTES NFR BLD AUTO: 0.3 %
LIPASE SERPL-CCNC: 47 U/L
LYMPHOCYTES # BLD AUTO: 1.77 X10(3)/MCL (ref 0.6–4.6)
LYMPHOCYTES NFR BLD AUTO: 14.9 %
MAGNESIUM SERPL-MCNC: 2.2 MG/DL (ref 1.6–2.6)
MCH RBC QN AUTO: 29 PG (ref 27–31)
MCHC RBC AUTO-ENTMCNC: 31.2 G/DL (ref 33–36)
MCV RBC AUTO: 92.9 FL (ref 80–94)
MONOCYTES # BLD AUTO: 0.63 X10(3)/MCL (ref 0.1–1.3)
MONOCYTES NFR BLD AUTO: 5.3 %
NEUTROPHILS # BLD AUTO: 9.38 X10(3)/MCL (ref 2.1–9.2)
NEUTROPHILS NFR BLD AUTO: 78.8 %
NRBC BLD AUTO-RTO: 0 %
OHS QRS DURATION: 64 MS
OHS QTC CALCULATION: 425 MS
PLATELET # BLD AUTO: 260 X10(3)/MCL (ref 130–400)
PMV BLD AUTO: 9.7 FL (ref 7.4–10.4)
POTASSIUM SERPL-SCNC: 5.2 MMOL/L (ref 3.5–5.1)
PROT SERPL-MCNC: 6.4 GM/DL (ref 5.8–7.6)
RBC # BLD AUTO: 3.38 X10(6)/MCL (ref 4.2–5.4)
SODIUM SERPL-SCNC: 131 MMOL/L (ref 136–145)
TROPONIN I SERPL-MCNC: 0.08 NG/ML (ref 0–0.04)
WBC # BLD AUTO: 11.9 X10(3)/MCL (ref 4.5–11.5)

## 2025-07-15 PROCEDURE — 83880 ASSAY OF NATRIURETIC PEPTIDE: CPT

## 2025-07-15 PROCEDURE — 63600175 PHARM REV CODE 636 W HCPCS

## 2025-07-15 PROCEDURE — 11000001 HC ACUTE MED/SURG PRIVATE ROOM

## 2025-07-15 PROCEDURE — 85025 COMPLETE CBC W/AUTO DIFF WBC: CPT

## 2025-07-15 PROCEDURE — 83690 ASSAY OF LIPASE: CPT

## 2025-07-15 PROCEDURE — 93005 ELECTROCARDIOGRAM TRACING: CPT

## 2025-07-15 PROCEDURE — 93010 ELECTROCARDIOGRAM REPORT: CPT | Mod: ,,, | Performed by: INTERNAL MEDICINE

## 2025-07-15 PROCEDURE — 99285 EMERGENCY DEPT VISIT HI MDM: CPT | Mod: 25

## 2025-07-15 PROCEDURE — 84484 ASSAY OF TROPONIN QUANT: CPT

## 2025-07-15 PROCEDURE — 83735 ASSAY OF MAGNESIUM: CPT

## 2025-07-15 PROCEDURE — 25000003 PHARM REV CODE 250

## 2025-07-15 PROCEDURE — 80053 COMPREHEN METABOLIC PANEL: CPT

## 2025-07-15 RX ORDER — IBUPROFEN 200 MG
16 TABLET ORAL
Status: DISCONTINUED | OUTPATIENT
Start: 2025-07-15 | End: 2025-07-19 | Stop reason: HOSPADM

## 2025-07-15 RX ORDER — OMEPRAZOLE 20 MG/1
20 TABLET, DELAYED RELEASE ORAL DAILY
Status: ON HOLD | COMMUNITY
End: 2025-07-19 | Stop reason: HOSPADM

## 2025-07-15 RX ORDER — ALUMINUM HYDROXIDE, MAGNESIUM HYDROXIDE, AND SIMETHICONE 1200; 120; 1200 MG/30ML; MG/30ML; MG/30ML
30 SUSPENSION ORAL 4 TIMES DAILY PRN
Status: DISCONTINUED | OUTPATIENT
Start: 2025-07-15 | End: 2025-07-19 | Stop reason: HOSPADM

## 2025-07-15 RX ORDER — SODIUM CHLORIDE 0.9 % (FLUSH) 0.9 %
10 SYRINGE (ML) INJECTION
Status: DISCONTINUED | OUTPATIENT
Start: 2025-07-15 | End: 2025-07-19 | Stop reason: HOSPADM

## 2025-07-15 RX ORDER — METHOCARBAMOL 750 MG/1
500 TABLET, FILM COATED ORAL 4 TIMES DAILY
COMMUNITY

## 2025-07-15 RX ORDER — ACETAMINOPHEN 325 MG/1
650 TABLET ORAL EVERY 4 HOURS PRN
Status: DISCONTINUED | OUTPATIENT
Start: 2025-07-15 | End: 2025-07-19 | Stop reason: HOSPADM

## 2025-07-15 RX ORDER — TALC
6 POWDER (GRAM) TOPICAL NIGHTLY PRN
Status: DISCONTINUED | OUTPATIENT
Start: 2025-07-15 | End: 2025-07-19 | Stop reason: HOSPADM

## 2025-07-15 RX ORDER — ENOXAPARIN SODIUM 100 MG/ML
1 INJECTION SUBCUTANEOUS
Status: COMPLETED | OUTPATIENT
Start: 2025-07-15 | End: 2025-07-15

## 2025-07-15 RX ORDER — BISACODYL 10 MG/1
10 SUPPOSITORY RECTAL DAILY PRN
Status: DISCONTINUED | OUTPATIENT
Start: 2025-07-15 | End: 2025-07-19 | Stop reason: HOSPADM

## 2025-07-15 RX ORDER — ONDANSETRON 8 MG/1
8 TABLET, FILM COATED ORAL EVERY 8 HOURS PRN
COMMUNITY

## 2025-07-15 RX ORDER — GLUCAGON 1 MG
1 KIT INJECTION
Status: DISCONTINUED | OUTPATIENT
Start: 2025-07-15 | End: 2025-07-19 | Stop reason: HOSPADM

## 2025-07-15 RX ORDER — IBUPROFEN 200 MG
24 TABLET ORAL
Status: DISCONTINUED | OUTPATIENT
Start: 2025-07-15 | End: 2025-07-19 | Stop reason: HOSPADM

## 2025-07-15 RX ORDER — POLYETHYLENE GLYCOL 3350 17 G/17G
17 POWDER, FOR SOLUTION ORAL 2 TIMES DAILY PRN
Status: DISCONTINUED | OUTPATIENT
Start: 2025-07-15 | End: 2025-07-19 | Stop reason: HOSPADM

## 2025-07-15 RX ADMIN — MELATONIN 6 MG: at 10:07

## 2025-07-15 RX ADMIN — ENOXAPARIN SODIUM 80 MG: 80 INJECTION SUBCUTANEOUS at 10:07

## 2025-07-16 LAB
ALBUMIN SERPL-MCNC: 2.9 G/DL (ref 3.4–4.8)
ALBUMIN/GLOB SERPL: 0.9 RATIO (ref 1.1–2)
ALP SERPL-CCNC: 62 UNIT/L (ref 40–150)
ALT SERPL-CCNC: 10 UNIT/L (ref 0–55)
ANION GAP SERPL CALC-SCNC: 10 MEQ/L
AORTIC SIZE INDEX (SOV): 1.7 CM/M2
APICAL FOUR CHAMBER EJECTION FRACTION: 62 %
APICAL TWO CHAMBER EJECTION FRACTION: 61 %
AST SERPL-CCNC: 15 UNIT/L (ref 11–45)
AV INDEX (PROSTH): 0.75
AV MEAN GRADIENT: 7 MMHG
AV PEAK GRADIENT: 13 MMHG
AV VALVE AREA BY VELOCITY RATIO: 1.7 CM²
AV VALVE AREA: 2.1 CM²
AV VELOCITY RATIO: 0.61
BASOPHILS # BLD AUTO: 0.05 X10(3)/MCL
BASOPHILS NFR BLD AUTO: 0.4 %
BILIRUB SERPL-MCNC: 0.4 MG/DL
BSA FOR ECHO PROCEDURE: 1.79 M2
BUN SERPL-MCNC: 63.2 MG/DL (ref 9.8–20.1)
CALCIUM SERPL-MCNC: 9.3 MG/DL (ref 8.4–10.2)
CHLORIDE SERPL-SCNC: 100 MMOL/L (ref 98–107)
CO2 SERPL-SCNC: 22 MMOL/L (ref 23–31)
COLOR STL: ABNORMAL
CONSISTENCY STL: ABNORMAL
CREAT SERPL-MCNC: 1.61 MG/DL (ref 0.55–1.02)
CREAT/UREA NIT SERPL: 39
CV ECHO LV RWT: 0.5 CM
DOP CALC AO PEAK VEL: 1.8 M/S
DOP CALC AO VTI: 33.2 CM
DOP CALC LVOT AREA: 2.8 CM2
DOP CALC LVOT DIAMETER: 1.9 CM
DOP CALC LVOT PEAK VEL: 1.1 M/S
DOP CALC LVOT STROKE VOLUME: 70.8 CM3
DOP CALC MV VTI: 28.8 CM
DOP CALCLVOT PEAK VEL VTI: 25 CM
E WAVE DECELERATION TIME: 222 MSEC
E/A RATIO: 0.8
E/E' RATIO: 9 M/S
ECHO LV POSTERIOR WALL: 0.9 CM (ref 0.6–1.1)
EOSINOPHIL # BLD AUTO: 0.17 X10(3)/MCL (ref 0–0.9)
EOSINOPHIL NFR BLD AUTO: 1.5 %
ERYTHROCYTE [DISTWIDTH] IN BLOOD BY AUTOMATED COUNT: 12.8 % (ref 11.5–17)
FERRITIN SERPL-MCNC: 128.77 NG/ML (ref 4.63–204)
FRACTIONAL SHORTENING: 38.9 % (ref 28–44)
GFR SERPLBLD CREATININE-BSD FMLA CKD-EPI: 32 ML/MIN/1.73/M2
GLOBULIN SER-MCNC: 3.1 GM/DL (ref 2.4–3.5)
GLUCOSE SERPL-MCNC: 228 MG/DL (ref 82–115)
HCT VFR BLD AUTO: 28.6 % (ref 37–47)
HEMOCCULT SP1 STL QL: POSITIVE
HGB BLD-MCNC: 8.9 G/DL (ref 12–16)
HR MV ECHO: 86 BPM
IMM GRANULOCYTES # BLD AUTO: 0.03 X10(3)/MCL (ref 0–0.04)
IMM GRANULOCYTES NFR BLD AUTO: 0.3 %
INTERVENTRICULAR SEPTUM: 0.9 CM (ref 0.6–1.1)
IRON SATN MFR SERPL: 20 % (ref 20–50)
IRON SERPL-MCNC: 38 UG/DL (ref 50–170)
LEFT ATRIUM AREA SYSTOLIC (APICAL 4 CHAMBER): 12.6 CM2
LEFT ATRIUM SIZE: 3.3 CM
LEFT INTERNAL DIMENSION IN SYSTOLE: 2.2 CM (ref 2.1–4)
LEFT VENTRICLE DIASTOLIC VOLUME INDEX: 31.4 ML/M2
LEFT VENTRICLE DIASTOLIC VOLUME: 54 ML
LEFT VENTRICLE END DIASTOLIC VOLUME APICAL 2 CHAMBER: 34 ML
LEFT VENTRICLE END DIASTOLIC VOLUME APICAL 4 CHAMBER: 54.2 ML
LEFT VENTRICLE END SYSTOLIC VOLUME APICAL 4 CHAMBER: 28.4 ML
LEFT VENTRICLE MASS INDEX: 53.9 G/M2
LEFT VENTRICLE SYSTOLIC VOLUME INDEX: 9.3 ML/M2
LEFT VENTRICLE SYSTOLIC VOLUME: 16 ML
LEFT VENTRICULAR INTERNAL DIMENSION IN DIASTOLE: 3.6 CM (ref 3.5–6)
LEFT VENTRICULAR MASS: 92.8 G
LV LATERAL E/E' RATIO: 10 M/S
LV SEPTAL E/E' RATIO: 7.8 M/S
LVED V (TEICH): 54.43 ML
LVES V (TEICH): 16.2 ML
LVOT MG: 3 MMHG
LVOT MV: 0.75 CM/S
LYMPHOCYTES # BLD AUTO: 2.55 X10(3)/MCL (ref 0.6–4.6)
LYMPHOCYTES NFR BLD AUTO: 22.4 %
Lab: 1.9 CM/M
MAGNESIUM SERPL-MCNC: 3.2 MG/DL (ref 1.6–2.6)
MCH RBC QN AUTO: 29.3 PG (ref 27–31)
MCHC RBC AUTO-ENTMCNC: 31.1 G/DL (ref 33–36)
MCV RBC AUTO: 94.1 FL (ref 80–94)
MONOCYTES # BLD AUTO: 1.09 X10(3)/MCL (ref 0.1–1.3)
MONOCYTES NFR BLD AUTO: 9.6 %
MV MEAN GRADIENT: 3 MMHG
MV PEAK A VEL: 0.88 M/S
MV PEAK E VEL: 0.7 M/S
MV PEAK GRADIENT: 7 MMHG
MV STENOSIS PRESSURE HALF TIME: 66 MS
MV VALVE AREA BY CONTINUITY EQUATION: 2.46 CM2
MV VALVE AREA P 1/2 METHOD: 3.33 CM2
NEUTROPHILS # BLD AUTO: 7.5 X10(3)/MCL (ref 2.1–9.2)
NEUTROPHILS NFR BLD AUTO: 65.8 %
NRBC BLD AUTO-RTO: 0 %
OHS CV CPX PATIENT HEIGHT IN: 59
OHS LV EJECTION FRACTION SIMPSONS BIPLANE MOD: 62 %
PISA TR MAX VEL: 2.2 M/S
PLATELET # BLD AUTO: 218 X10(3)/MCL (ref 130–400)
PMV BLD AUTO: 9.9 FL (ref 7.4–10.4)
POCT GLUCOSE: 159 MG/DL (ref 70–110)
POCT GLUCOSE: 230 MG/DL (ref 70–110)
POCT GLUCOSE: 263 MG/DL (ref 70–110)
POTASSIUM SERPL-SCNC: 5 MMOL/L (ref 3.5–5.1)
PROT SERPL-MCNC: 6 GM/DL (ref 5.8–7.6)
RA PRESSURE ESTIMATED: 3 MMHG
RBC # BLD AUTO: 3.04 X10(6)/MCL (ref 4.2–5.4)
RV TB RVSP: 5 MMHG
SINUS: 2.9 CM
SODIUM SERPL-SCNC: 132 MMOL/L (ref 136–145)
TDI LATERAL: 0.07 M/S
TDI SEPTAL: 0.09 M/S
TDI: 0.08 M/S
TIBC SERPL-MCNC: 153 UG/DL (ref 70–310)
TIBC SERPL-MCNC: 191 UG/DL (ref 250–450)
TR MAX PG: 19 MMHG
TRANSFERRIN SERPL-MCNC: 164 MG/DL
TRICUSPID ANNULAR PLANE SYSTOLIC EXCURSION: 1.7 CM
TROPONIN I SERPL-MCNC: 0.06 NG/ML (ref 0–0.04)
TROPONIN I SERPL-MCNC: 0.08 NG/ML (ref 0–0.04)
TV REST PULMONARY ARTERY PRESSURE: 22 MMHG
VIT B12 SERPL-MCNC: 346 PG/ML (ref 213–816)
WBC # BLD AUTO: 11.39 X10(3)/MCL (ref 4.5–11.5)
Z-SCORE OF LEFT VENTRICULAR DIMENSION IN END DIASTOLE: -2.82
Z-SCORE OF LEFT VENTRICULAR DIMENSION IN END SYSTOLE: -2.33

## 2025-07-16 PROCEDURE — 36415 COLL VENOUS BLD VENIPUNCTURE: CPT

## 2025-07-16 PROCEDURE — 25000003 PHARM REV CODE 250: Performed by: INTERNAL MEDICINE

## 2025-07-16 PROCEDURE — 63600175 PHARM REV CODE 636 W HCPCS: Performed by: INTERNAL MEDICINE

## 2025-07-16 PROCEDURE — 85025 COMPLETE CBC W/AUTO DIFF WBC: CPT

## 2025-07-16 PROCEDURE — 83540 ASSAY OF IRON: CPT | Performed by: INTERNAL MEDICINE

## 2025-07-16 PROCEDURE — 25000003 PHARM REV CODE 250

## 2025-07-16 PROCEDURE — 21400001 HC TELEMETRY ROOM

## 2025-07-16 PROCEDURE — 83735 ASSAY OF MAGNESIUM: CPT

## 2025-07-16 PROCEDURE — A9500 TC99M SESTAMIBI: HCPCS | Performed by: INTERNAL MEDICINE

## 2025-07-16 PROCEDURE — 82607 VITAMIN B-12: CPT | Performed by: INTERNAL MEDICINE

## 2025-07-16 PROCEDURE — 80053 COMPREHEN METABOLIC PANEL: CPT

## 2025-07-16 PROCEDURE — 84484 ASSAY OF TROPONIN QUANT: CPT

## 2025-07-16 PROCEDURE — 25500020 PHARM REV CODE 255: Performed by: INTERNAL MEDICINE

## 2025-07-16 PROCEDURE — 82272 OCCULT BLD FECES 1-3 TESTS: CPT | Performed by: INTERNAL MEDICINE

## 2025-07-16 PROCEDURE — 82728 ASSAY OF FERRITIN: CPT | Performed by: INTERNAL MEDICINE

## 2025-07-16 RX ORDER — PANTOPRAZOLE SODIUM 40 MG/1
40 TABLET, DELAYED RELEASE ORAL
Status: DISCONTINUED | OUTPATIENT
Start: 2025-07-16 | End: 2025-07-17

## 2025-07-16 RX ORDER — ONDANSETRON HYDROCHLORIDE 2 MG/ML
4 INJECTION, SOLUTION INTRAVENOUS EVERY 6 HOURS PRN
Status: DISCONTINUED | OUTPATIENT
Start: 2025-07-16 | End: 2025-07-19 | Stop reason: HOSPADM

## 2025-07-16 RX ORDER — LEVOTHYROXINE SODIUM 100 UG/1
100 TABLET ORAL
Status: DISCONTINUED | OUTPATIENT
Start: 2025-07-17 | End: 2025-07-19 | Stop reason: HOSPADM

## 2025-07-16 RX ORDER — TETRAKIS(2-METHOXYISOBUTYLISOCYANIDE)COPPER(I) TETRAFLUOROBORATE 1 MG/ML
33 INJECTION, POWDER, LYOPHILIZED, FOR SOLUTION INTRAVENOUS
Status: DISCONTINUED | OUTPATIENT
Start: 2025-07-16 | End: 2025-07-19 | Stop reason: HOSPADM

## 2025-07-16 RX ORDER — TETRAKIS(2-METHOXYISOBUTYLISOCYANIDE)COPPER(I) TETRAFLUOROBORATE 1 MG/ML
11 INJECTION, POWDER, LYOPHILIZED, FOR SOLUTION INTRAVENOUS
Status: COMPLETED | OUTPATIENT
Start: 2025-07-16 | End: 2025-07-16

## 2025-07-16 RX ORDER — GABAPENTIN 300 MG/1
300 CAPSULE ORAL 3 TIMES DAILY
Status: DISCONTINUED | OUTPATIENT
Start: 2025-07-16 | End: 2025-07-19 | Stop reason: HOSPADM

## 2025-07-16 RX ORDER — REGADENOSON 0.08 MG/ML
0.4 INJECTION, SOLUTION INTRAVENOUS
Status: DISCONTINUED | OUTPATIENT
Start: 2025-07-16 | End: 2025-07-19 | Stop reason: HOSPADM

## 2025-07-16 RX ORDER — SODIUM CHLORIDE 9 MG/ML
INJECTION, SOLUTION INTRAVENOUS CONTINUOUS
Status: DISCONTINUED | OUTPATIENT
Start: 2025-07-16 | End: 2025-07-19 | Stop reason: HOSPADM

## 2025-07-16 RX ORDER — INSULIN ASPART 100 [IU]/ML
0-5 INJECTION, SOLUTION INTRAVENOUS; SUBCUTANEOUS EVERY 6 HOURS PRN
Status: DISCONTINUED | OUTPATIENT
Start: 2025-07-16 | End: 2025-07-19 | Stop reason: HOSPADM

## 2025-07-16 RX ORDER — SODIUM CHLORIDE 9 MG/ML
INJECTION, SOLUTION INTRAVENOUS ONCE
Status: DISCONTINUED | OUTPATIENT
Start: 2025-07-16 | End: 2025-07-19 | Stop reason: HOSPADM

## 2025-07-16 RX ORDER — GLUCAGON 1 MG
1 KIT INJECTION
Status: DISCONTINUED | OUTPATIENT
Start: 2025-07-16 | End: 2025-07-19 | Stop reason: HOSPADM

## 2025-07-16 RX ORDER — ATORVASTATIN CALCIUM 40 MG/1
40 TABLET, FILM COATED ORAL NIGHTLY
Status: DISCONTINUED | OUTPATIENT
Start: 2025-07-16 | End: 2025-07-19 | Stop reason: HOSPADM

## 2025-07-16 RX ORDER — LABETALOL HYDROCHLORIDE 5 MG/ML
10 INJECTION, SOLUTION INTRAVENOUS EVERY 4 HOURS PRN
Status: DISCONTINUED | OUTPATIENT
Start: 2025-07-16 | End: 2025-07-19 | Stop reason: HOSPADM

## 2025-07-16 RX ORDER — HYDRALAZINE HYDROCHLORIDE 20 MG/ML
10 INJECTION INTRAMUSCULAR; INTRAVENOUS EVERY 4 HOURS PRN
Status: DISCONTINUED | OUTPATIENT
Start: 2025-07-16 | End: 2025-07-19 | Stop reason: HOSPADM

## 2025-07-16 RX ADMIN — GABAPENTIN 300 MG: 300 CAPSULE ORAL at 01:07

## 2025-07-16 RX ADMIN — ACETAMINOPHEN 650 MG: 325 TABLET ORAL at 02:07

## 2025-07-16 RX ADMIN — INSULIN ASPART 3 UNITS: 100 INJECTION, SOLUTION INTRAVENOUS; SUBCUTANEOUS at 04:07

## 2025-07-16 RX ADMIN — PANTOPRAZOLE SODIUM 40 MG: 40 TABLET, DELAYED RELEASE ORAL at 04:07

## 2025-07-16 RX ADMIN — KIT FOR THE PREPARATION OF TECHNETIUM TC99M SESTAMIBI 11 MILLICURIE: 1 INJECTION, POWDER, LYOPHILIZED, FOR SOLUTION PARENTERAL at 11:07

## 2025-07-16 RX ADMIN — INSULIN ASPART 2 UNITS: 100 INJECTION, SOLUTION INTRAVENOUS; SUBCUTANEOUS at 10:07

## 2025-07-16 RX ADMIN — SODIUM CHLORIDE: 9 INJECTION, SOLUTION INTRAVENOUS at 04:07

## 2025-07-16 RX ADMIN — GABAPENTIN 300 MG: 300 CAPSULE ORAL at 09:07

## 2025-07-16 RX ADMIN — PERFLUTREN 1 ML: 6.52 INJECTION, SUSPENSION INTRAVENOUS at 05:07

## 2025-07-16 RX ADMIN — PANTOPRAZOLE SODIUM 40 MG: 40 TABLET, DELAYED RELEASE ORAL at 03:07

## 2025-07-16 RX ADMIN — ATORVASTATIN CALCIUM 40 MG: 40 TABLET, FILM COATED ORAL at 09:07

## 2025-07-16 RX ADMIN — SODIUM CHLORIDE: 9 INJECTION, SOLUTION INTRAVENOUS at 11:07

## 2025-07-16 RX ADMIN — SODIUM ZIRCONIUM CYCLOSILICATE 10 G: 10 POWDER, FOR SUSPENSION ORAL at 01:07

## 2025-07-16 NOTE — PLAN OF CARE
07/16/25 1106   Discharge Assessment   Assessment Type Discharge Planning Assessment   Confirmed/corrected address, phone number and insurance Yes   Confirmed Demographics Correct on Facesheet   Source of Information family  (Ki heller at )   Communicated HUSSAIN with patient/caregiver Yes   People in Home child(shyam), adult   Name(s) of People in Home lives with ki Hanson   Facility Arrived From: home   Do you expect to return to your current living situation?   (ki Heller reports that pt will be moving to [place] when sc to live with fmly)   Do you have help at home or someone to help you manage your care at home? Yes   Who are your caregiver(s) and their phone number(s)? fm[facility]   Current cognitive status: Alert/Oriented   Walking or Climbing Stairs Difficulty yes   Walking or Climbing Stairs ambulation difficulty, requires equipment   Mobility Management rw   Dressing/Bathing Difficulty yes   Dressing/Bathing bathing difficulty, assistance 1 person   Home Accessibility wheelchair accessible   Equipment Currently Used at Home walker, rolling   Do you currently have service(s) that help you manage your care at home? No   Do you take prescription medications? Yes   Do you have any problems affording any of your prescribed medications? No   Is the patient taking medications as prescribed? yes   Who is going to help you get home at discharge? fm[facility]   How do you get to doctors appointments? family or friend will provide   Discharge Plan A Home with family   Discharge Plan B Home Health   DME Needed Upon Discharge  other (see comments)  (tbd)   Discharge Plan discussed with: Patient;Adult children   Transition of Care Barriers None   Housing Stability   In the last 12 months, was there a time when you were not able to pay the mortgage or rent on time? N   At any time in the past 12 months, were you homeless or living in a shelter (including now)? N   Transportation Needs   In the past 12 months, has lack of transportation  kept you from medical appointments or from getting medications? no   In the past 12 months, has lack of transportation kept you from meetings, work, or from getting things needed for daily living? No   Food Insecurity   Within the past 12 months, you worried that your food would run out before you got the money to buy more. Never true   Within the past 12 months, the food you bought just didn't last and you didn't have money to get more. Never true   Utilities   In the past 12 months has the electric, gas, oil, or water company threatened to shut off services in your home? No   Health Literacy   How often do you need to have someone help you when you read instructions, pamphlets, or other written material from your doctor or pharmacy? Sometimes     I spoke to pt son Martin who is at . Pt speaks some English, mostly Romanian. He reports that pt lives with his brother Valentin . She did have [facility] 1 mth ago but is not current with them. She uses [facility].  Son reports that he believes his mother can not live alone anymore and she will be moving to [place] with ly at [place]. Other needs TBD.

## 2025-07-16 NOTE — CONSULTS
Inpatient consult to Cardiology  Consult performed by: Geo Macias MD  Consult ordered by: Salome Tom FNP        [facility]    Cardiology  Consult Note    Patient Name: Gabriela Lozano  MRN: [id number]  Admission Date: 7/15/2025  Hospital Length of Stay: 1 days  Code Status: Full Code   Attending Provider: Geo Macias MD   Consulting Provider: Giacomo Carranza RN  Primary Care Physician: No, Primary Doctor  Principal Problem:<principal problem not specified>    Patient information was obtained from patient, past medical records, and ER records.     Subjective:     Chief Complaint/Reason for Consult: Elevated Troponin     HPI:   Ms. Gabriela Lozano is an 79-year-old known to Dr. Chatterjee (2017), who presented to [facility]  with complaints of mid epigastric abdominal pain. weakness, nausea , vomiting , dark black stools. She reports she has been using NSAID's daily for left hip pain.  She has a history of CAD s/p PCI to the LAD and RCA in 2017, HF,  HTN, HLD, hypothyroidism, DM2, obesity. Upon workup, troponin was 0.083, 0.075, , K 5.2, WBC 11.90, H/H 9.8/31.4, , BUN 43.3, Cr 1.72, CXR No acute cardiopulmonary process identified. [facility] is consulted for elevated troponin .     PMH: CAD s/p PCI to the LAD and RCA in 2017, HTN, HLD, hypothyroidism, DM2, obesity.  PSH: Coronary stents, , hysterectomy, femur surgery  Family History: Brother-DM 2  Social History: Denied smoking, alcohol and drugs     Previous Cardiac Diagnostics:   US BLE 5.15.25  Negative for deep and superficial vein thrombosis in bilateral lower extremities     Echocardiogram 5.13.25  Left Ventricle: The left ventricle is normal in size. Normal wall thickness. There is normal systolic function with a visually estimated ejection fraction of 60 - 65%. There is normal diastolic function.  Right Ventricle: Systolic function is normal.  Mitral Valve: The mitral valve is structurally  normal.  Tricuspid Valve: There is trace regurgitation.  Pulmonary Artery: The estimated pulmonary artery systolic pressure is 22 mmHg.  Pericardium: There is no pericardial effusion.     [facility] 3/29/17   Left main-normal, lad 90% InStent stenosis stented with drug-eluting stent, mid LAD to distal 70% InStent restenosis stented with drug-eluting stent, RCA stented with drug-eluting stent       Review of patient's allergies indicates:   Allergen Reactions    Sitagliptin      No current facility-administered medications on file prior to encounter.     Current Outpatient Medications on File Prior to Encounter   Medication Sig    atorvastatin (LIPITOR) 40 MG tablet Take 40 mg by mouth once daily.    carvediloL (COREG) 12.5 MG tablet Take 1 tablet (12.5 mg total) by mouth 2 (two) times daily.    clopidogreL (PLAVIX) 75 mg tablet Take 75 mg by mouth once daily.    gabapentin (NEURONTIN) 600 MG tablet Take 0.5 tablets (300 mg total) by mouth 3 (three) times daily.    isosorbide mononitrate (IMDUR) 30 MG 24 hr tablet Take 30 mg by mouth every morning.    levothyroxine (SYNTHROID) 100 MCG tablet 1 tablet in the morning on an empty stomach Orally Once a day for 100 days    lisinopriL (PRINIVIL,ZESTRIL) 20 MG tablet Take 1.5 tablets (30 mg total) by mouth once daily.    methocarbamoL (ROBAXIN) 750 MG Tab Take 500 mg by mouth 4 (four) times daily.    multivitamin with minerals tablet Take 1 tablet by mouth once daily.    NOVOLIN R FLEXPEN 100 unit/mL (3 mL) InPn pen Inject 1-10 Units into the skin 3 (three) times daily as needed (Hyperglycemia).    omeprazole (PRILOSEC OTC) 20 MG tablet Take 20 mg by mouth once daily.    ondansetron (ZOFRAN) 8 MG tablet Take 8 mg by mouth every 8 (eight) hours as needed for Nausea.    acetaminophen (TYLENOL) 650 MG TbSR Take 1 tablet (650 mg total) by mouth every 8 (eight) hours as needed (Pain).    cholestyramine-aspartame (CHOLESTYRAMINE LIGHT) 4 gram PwPk Take 1 packet (4 grams of anhydrous  cholestyramine total) by mouth daily as needed (Diarrhea).    furosemide (LASIX) 20 MG tablet Take 1 tablet (20 mg total) by mouth daily as needed (In case of swelling, weight gain more than 2 lb overnight). (Patient not taking: Reported on 7/15/2025)    insulin glargine U-100, Lantus, 100 unit/mL injection Inject 18 Units into the skin every evening. Increase by 2 units every other night until fasting CBG is persistently below 120    mirtazapine (REMERON) 15 MG tablet Take 15 mg by mouth every evening. (Patient not taking: Reported on 7/15/2025)     Family History       Problem Relation (Age of Onset)    No Known Problems Mother, Father, Sister, Brother          Tobacco Use    Smoking status: Never    Smokeless tobacco: Never   Vaping Use    Vaping status: Never Used   Substance and Sexual Activity    Alcohol use: Not Currently    Drug use: Never    Sexual activity: Not on file       Review of Systems   Respiratory:  Positive for shortness of breath.      Objective:     Vital Signs (Most Recent):  Temp: 98.1 °F (36.7 °C) (07/16/25 0834)  Pulse: 87 (07/16/25 0834)  Resp: 20 (07/16/25 0834)  BP: 114/72 (07/16/25 0815)  SpO2: 95 % (07/16/25 0811) Vital Signs (24h Range):  Temp:  [97 °F (36.1 °C)-98.2 °F (36.8 °C)] 98.1 °F (36.7 °C)  Pulse:  [70-91] 87  Resp:  [13-20] 20  SpO2:  [95 %-100 %] 95 %  BP: (108-133)/(59-80) 114/72   Weight: 77.1 kg (170 lb)  Body mass index is 34.34 kg/m².  SpO2: 95 %       Intake/Output Summary (Last 24 hours) at 7/16/2025 0847  Last data filed at 7/16/2025 0400  Gross per 24 hour   Intake --   Output 1 ml   Net -1 ml     Lines/Drains/Airways       Drain  Duration             Female External Urinary Catheter w/ Suction 07/16/25 0031 <1 day              Peripheral Intravenous Line  Duration             Peripheral IV Single Lumen 07/15/25 2130 20 G Right Antecubital <1 day                  Significant Labs:   Chemistries:   Recent Labs   Lab 07/15/25  1938 07/16/25  0210   * 132*   K  "5.2* 5.0    100   CO2 21* 22*   BUN 43.3* 63.2*   CREATININE 1.72* 1.61*   CALCIUM 9.3 9.3   PROT 6.4 6.0   BILITOT 0.5 0.4   ALKPHOS 72 62   ALT 11 10   AST 14 15   MG 2.20 3.20*   TROPONINI 0.083* 0.076*        CBC/Anemia Labs: Coags:    Recent Labs   Lab 07/15/25  1938 07/16/25  0210   WBC 11.90* 11.39   HGB 9.8* 8.9*   HCT 31.4* 28.6*    218   MCV 92.9 94.1*   RDW 12.6 12.8   IRON  --  38*   FERRITIN  --  128.77   [id number]  --  346    No results for input(s): "PT", "INR", "APTT" in the last 168 hours.     Significant Imaging:  Imaging Results              X-Ray Chest 1 View (Final result)  Result time 07/15/25 22:25:40   Procedure changed from X-Ray Chest PA And Lateral     Final result by Rafi Li MD (07/15/25 22:25:40)                   Impression:      No acute cardiopulmonary process identified.      Electronically signed by: Rafi Li  Date:    07/15/2025  Time:    22:25               Narrative:    EXAMINATION:  XR CHEST 1 VIEW    CLINICAL HISTORY:  weakness;Weakness    TECHNIQUE:  One view    COMPARISON:  May 12, 2025.    FINDINGS:  Cardiopericardial silhouette is within normal limits.  Lungs hypoventilatory changes without convincing dense focal or segmental consolidation, overt congestive process, pleural effusions or pneumothorax.                                    EKG:       Physical Exam  Home Medications:   Medications Ordered Prior to Encounter[1]  Current Schedule Inpatient Medications:   pantoprazole  40 mg Oral BID AC     Continuous Infusions:   0.9% NaCl   Intravenous Continuous 75 mL/hr at 07/16/25 0414 New Bag at 07/16/25 0414     Assessment:   NSTEMI , type II  secondary to   Chronic Systolic HF  --EF 60-65 % (Echo 5.13.25)  Chronic Anemia  CAD  -S/p coronary stent placement to the LAD and RCA 3/17  HTN  HLD  DM 2  BRYNN on CKD  DM II  Hypothyroidism  Hyperkalemia   Plan:   Continue Plavix, Coreg, Lisinopril, Imdur  LexiPet scan today  Keep NPO    Transcribed in the " presence of Dr. MADAY Carranza, RN  Cardiology  [facility]                         --------------   CARDIOLOGY ATTENDING ADDENDUM   Please also see NP/PA portion of note  ---------------  07/16/2025 11:58 AM    I evaluated Gabriela Lozano.  The patient is a 80 y.o. female with:   Chief Complaint   Patient presents with    Emesis     C/o nausea, vomiting, inc weakness x 3 days. Symptoms worsened this morning. Hx of DM. . GCS 15. Denies CP.          Gabriela Lozano has the following scheduled Medications   atorvastatin  40 mg Oral QHS    gabapentin  300 mg Oral TID    [START ON 7/17/2025] levothyroxine  100 mcg Oral Before breakfast    pantoprazole  40 mg Oral BID AC    sodium zirconium cyclosilicate  10 g Oral Once       Gabriela Lozano has the following Lab Results   Last BMP BMP  Lab Results   Component Value Date     (L) 07/16/2025    K 5.0 07/16/2025     07/16/2025    CO2 22 (L) 07/16/2025    BUN 63.2 (H) 07/16/2025    CREATININE 1.61 (H) 07/16/2025    CALCIUM 9.3 07/16/2025    EGFRNORACEVR 32 07/16/2025      Lab Results   Component Value Date    CREATININE 1.61 (H) 07/16/2025    CREATININE 1.72 (H) 07/15/2025    CREATININE 1.35 (H) 05/16/2025     Last CBC     Lab Results   Component Value Date    WBC 11.39 07/16/2025    HGB 8.9 (L) 07/16/2025    HCT 28.6 (L) 07/16/2025    MCV 94.1 (H) 07/16/2025     07/16/2025           Lab Results   Component Value Date    HGB 8.9 (L) 07/16/2025    HGB 9.8 (L) 07/15/2025    HGB 8.6 (L) 05/16/2025     BNP    Lab Results   Component Value Date    .6 (H) 07/15/2025    .6 (H) 05/12/2025    .6 (H) 01/17/2025     Troponin   Lab Results   Component Value Date    TROPONINI 0.056 (H) 07/16/2025    TROPONINI 0.076 (H) 07/16/2025    TROPONINI 0.083 (H) 07/15/2025     Last lipids    Lab Results   Component Value Date    CHOL 149 12/06/2024    HDL 41 12/06/2024    LDL 93.00 12/06/2024  "   TRIG 77 12/06/2024    TOTALCHOLEST 4 12/06/2024        Gabriela Lozano has the following Echocardiogram Results  Results for orders placed during the hospital encounter of 05/12/25    Echo    Interpretation Summary    Left Ventricle: The left ventricle is normal in size. Normal wall thickness. There is normal systolic function with a visually estimated ejection fraction of 60 - 65%. There is normal diastolic function.    Right Ventricle: Systolic function is normal.    Mitral Valve: The mitral valve is structurally normal.    Tricuspid Valve: There is trace regurgitation.    Pulmonary Artery: The estimated pulmonary artery systolic pressure is 22 mmHg.    Pericardium: There is no pericardial effusion.      Gabriela Lozano has the following Stress Test Results  No results found for this or any previous visit.      Gabriela Lozano has the following Coronary Angiogram Results   No results found for this or any previous visit.      Gabriela Lozano has the following Holter Monitor Results   No cardiac monitor results found for the past 12 months    THE ABOVE ARE DIAGNOSTIC RESULTS PULLED INTO THIS NOTE ON Gabriela Lozano    Patient c/o feeling weak.  She had CP radiating into her chest.      ROS    See HPI, otherwise ROS is negative    Blood pressure 108/69, pulse 77, temperature 98 °F (36.7 °C), temperature source Oral, resp. rate 18, height 4' 11" (1.499 m), weight 77.1 kg (170 lb), SpO2 99%.  PE    GEN  No acute distress  Not ill appearing Laying in bed   NECK  Supple Thick neck      CV   Normal rate  Regular rhythm    PUL  No respiratory distress  No wheezing.    ABD  No distention Obese    LOW EXT   No deformity   No edema    SKIN  No bruising  No rash  Moist skin  NEURO  Awake and alert          Assessment/Plan  I agree with the assessment and plan as outlined above  Obtain nuclear stress test in view of her chest discomfort.  (Patient does minimal activities)      Casey BURROWS" MD Jevon    Cardiologist        [1]   No current facility-administered medications on file prior to encounter.     Current Outpatient Medications on File Prior to Encounter   Medication Sig Dispense Refill    atorvastatin (LIPITOR) 40 MG tablet Take 40 mg by mouth once daily.      carvediloL (COREG) 12.5 MG tablet Take 1 tablet (12.5 mg total) by mouth 2 (two) times daily.      clopidogreL (PLAVIX) 75 mg tablet Take 75 mg by mouth once daily.      gabapentin (NEURONTIN) 600 MG tablet Take 0.5 tablets (300 mg total) by mouth 3 (three) times daily.      isosorbide mononitrate (IMDUR) 30 MG 24 hr tablet Take 30 mg by mouth every morning.      levothyroxine (SYNTHROID) 100 MCG tablet 1 tablet in the morning on an empty stomach Orally Once a day for 100 days      lisinopriL (PRINIVIL,ZESTRIL) 20 MG tablet Take 1.5 tablets (30 mg total) by mouth once daily.      methocarbamoL (ROBAXIN) 750 MG Tab Take 500 mg by mouth 4 (four) times daily.      multivitamin with minerals tablet Take 1 tablet by mouth once daily.      NOVOLIN R FLEXPEN 100 unit/mL (3 mL) InPn pen Inject 1-10 Units into the skin 3 (three) times daily as needed (Hyperglycemia).      omeprazole (PRILOSEC OTC) 20 MG tablet Take 20 mg by mouth once daily.      ondansetron (ZOFRAN) 8 MG tablet Take 8 mg by mouth every 8 (eight) hours as needed for Nausea.      acetaminophen (TYLENOL) 650 MG TbSR Take 1 tablet (650 mg total) by mouth every 8 (eight) hours as needed (Pain).      cholestyramine-aspartame (CHOLESTYRAMINE LIGHT) 4 gram PwPk Take 1 packet (4 grams of anhydrous cholestyramine total) by mouth daily as needed (Diarrhea).      furosemide (LASIX) 20 MG tablet Take 1 tablet (20 mg total) by mouth daily as needed (In case of swelling, weight gain more than 2 lb overnight). (Patient not taking: Reported on 7/15/2025)      insulin glargine U-100, Lantus, 100 unit/mL injection Inject 18 Units into the skin every evening. Increase by 2 units every other  night until fasting CBG is persistently below 120      mirtazapine (REMERON) 15 MG tablet Take 15 mg by mouth every evening. (Patient not taking: Reported on 7/15/2025)

## 2025-07-16 NOTE — CONSULTS
Consult Note    Reason for Consult:      We were consulted to evaluate this patient for Nausea/vomiting/epigastric pain/NSAID abuse/melanocytic stools .     HPI:   Patient is an 80-year-old  female previously known to Dr. Denney, most recently seen in clinic by Dr. Jose R lopez/anastacio of anemia, diabetes, hypertension, CAD with stent on plavix.    Patient presented from home to ER with c/o  of N/V/weakness x3 days.  Upon arrival, afebrile VSS.  WBC 11.9, H&H 9.8 yesterday 4 K5.2, BUN/CR 40/1.72 troponin positive, lipase normal, .  CXR negative for acute process.  Of note she had endorse constipation and was seen in urgent Care with the gave her Mag citrate and she had had 2 bowel movements since then.  Patient admitted with weakness, epigastric pain and non-STEMI.    GI consulted for  Nausea/vomiting/epigastric pain/NSAID abuse/melanocytic stools  Hgb 9.8--8.9  Iron 38m TIBC 1+12, ferritin 128, iron sat 20.  Troponin trending down-cardiology consulted  Patient does speak some english but primarily Vietnamese. Spoke with son yue to translate. Patient had nausea x 4 days with some vomiting of clear/bile like material. This caused a decrease in appetite and oral intake and she could not take her medications. She has been taking pepto recently for upset stomach. Hx of anemia, was on oral iron but was taken off about a month ago. This did cause constipation. Otherwise she had regular bowel movements daily. Recently having dark stool likely 2/2 pepto use. Son does report 1 other family member with recent GI illness.    She reports nausea and vomiting better today than yesterday.    Recently seen in clinic January 30, 2025 for diarrhea.  No previous egd/colonoscopy.    PCP:  No, Primary Doctor    Review of patient's allergies indicates:   Allergen Reactions    Sitagliptin         Current Medications[1]  Prescriptions Prior to Admission[2]    Past Medical History:  Past Medical History:   Diagnosis Date    CAD  (coronary artery disease)     Diabetes mellitus     Hypertension     Hypothyroidism, unspecified       Past Surgical History:  Past Surgical History:   Procedure Laterality Date     SECTION      CORONARY ANGIOPLASTY WITH STENT PLACEMENT      HYSTERECTOMY      RETROGRADE INTRAMEDULLARY RODDING OF DISTAL FEMUR Left 2024    Procedure: INSERTION, INTRAMEDULLARY MARCOS, FEMUR, DISTAL, RETROGRADE;  Surgeon: Godwin Teixeira MD;  Location: [facility];  Service: Orthopedics;  Laterality: Left;      Family History:  Family History   Problem Relation Name Age of Onset    No Known Problems Mother      No Known Problems Father      No Known Problems Sister      No Known Problems Brother       Social History:  Social History     Tobacco Use    Smoking status: Never    Smokeless tobacco: Never   Substance Use Topics    Alcohol use: Not Currently       Review of Systems:     Review of Systems   Gastrointestinal:  Positive for constipation, diarrhea, nausea and vomiting.       Objective:     VITAL SIGNS: 24 HR MIN & MAX LAST    Temp  Min: 97 °F (36.1 °C)  Max: 98.2 °F (36.8 °C)  98.1 °F (36.7 °C)        BP  Min: 108/79  Max: 133/59  114/72     Pulse  Min: 70  Max: 91  87     Resp  Min: 13  Max: 20  20    SpO2  Min: 95 %  Max: 100 %  95 %        Intake/Output Summary (Last 24 hours) at 2025 0854  Last data filed at 2025 0400  Gross per 24 hour   Intake --   Output 1 ml   Net -1 ml       Physical Exam  Constitutional:       General: She is not in acute distress.     Appearance: She is obese. She is not ill-appearing or toxic-appearing.   HENT:      Head: Normocephalic and atraumatic.      Mouth/Throat:      Mouth: Mucous membranes are moist.      Pharynx: Oropharynx is clear.   Cardiovascular:      Rate and Rhythm: Normal rate and regular rhythm.      Pulses: Normal pulses.      Heart sounds: Normal heart sounds.   Pulmonary:      Effort: Pulmonary effort is normal.      Breath sounds: Normal breath sounds.    Abdominal:      General: Bowel sounds are normal. There is no distension.      Palpations: Abdomen is soft. There is no mass.      Tenderness: There is no abdominal tenderness. There is no guarding.      Hernia: No hernia is present.   Musculoskeletal:         General: Normal range of motion.   Skin:     General: Skin is warm and dry.      Coloration: Skin is not pale.   Neurological:      Mental Status: She is alert and oriented to person, place, and time.   Psychiatric:         Mood and Affect: Mood normal.         Behavior: Behavior normal.         Thought Content: Thought content normal.         Judgment: Judgment normal.           Recent Results (from the past 48 hours)   EKG 12-lead    Collection Time: 07/15/25  6:56 PM   Result Value Ref Range    QRS Duration 64 ms    [facility] QTC Calculation 425 ms   Comprehensive metabolic panel    Collection Time: 07/15/25  7:38 PM   Result Value Ref Range    Sodium 131 (L) 136 - 145 mmol/L    Potassium 5.2 (H) 3.5 - 5.1 mmol/L    Chloride 101 98 - 107 mmol/L    CO2 21 (L) 23 - 31 mmol/L    Glucose 270 (H) 82 - 115 mg/dL    Blood Urea Nitrogen 43.3 (H) 9.8 - 20.1 mg/dL    Creatinine 1.72 (H) 0.55 - 1.02 mg/dL    Calcium 9.3 8.4 - 10.2 mg/dL    Protein Total 6.4 5.8 - 7.6 gm/dL    Albumin 3.1 (L) 3.4 - 4.8 g/dL    Globulin 3.3 2.4 - 3.5 gm/dL    Albumin/Globulin Ratio 0.9 (L) 1.1 - 2.0 ratio    Bilirubin Total 0.5 <=1.5 mg/dL    ALP 72 40 - 150 unit/L    ALT 11 0 - 55 unit/L    AST 14 11 - 45 unit/L    eGFR 30 mL/min/1.73/m2    Anion Gap 9.0 mEq/L    BUN/Creatinine Ratio 25    Magnesium    Collection Time: 07/15/25  7:38 PM   Result Value Ref Range    Magnesium Level 2.20 1.60 - 2.60 mg/dL   Troponin I    Collection Time: 07/15/25  7:38 PM   Result Value Ref Range    Troponin-I 0.083 (H) 0.000 - 0.045 ng/mL   Lipase    Collection Time: 07/15/25  7:38 PM   Result Value Ref Range    Lipase Level 47 <=60 U/L   CBC with Differential    Collection Time: 07/15/25  7:38 PM   Result  Value Ref Range    WBC 11.90 (H) 4.50 - 11.50 x10(3)/mcL    RBC 3.38 (L) 4.20 - 5.40 x10(6)/mcL    Hgb 9.8 (L) 12.0 - 16.0 g/dL    Hct 31.4 (L) 37.0 - 47.0 %    MCV 92.9 80.0 - 94.0 fL    MCH 29.0 27.0 - 31.0 pg    MCHC 31.2 (L) 33.0 - 36.0 g/dL    RDW 12.6 11.5 - 17.0 %    Platelet 260 130 - 400 x10(3)/mcL    MPV 9.7 7.4 - 10.4 fL    Neut % 78.8 %    Lymph % 14.9 %    Mono % 5.3 %    Eos % 0.4 %    Basophil % 0.3 %    Imm Grans % 0.3 %    Neut # 9.38 (H) 2.1 - 9.2 x10(3)/mcL    Lymph # 1.77 0.6 - 4.6 x10(3)/mcL    Mono # 0.63 0.1 - 1.3 x10(3)/mcL    Eos # 0.05 0 - 0.9 x10(3)/mcL    Baso # 0.03 <=0.2 x10(3)/mcL    Imm Gran # 0.04 0.00 - 0.04 x10(3)/mcL    NRBC% 0.0 %   Brain natriuretic peptide    Collection Time: 07/15/25  7:38 PM   Result Value Ref Range    Natriuretic Peptide 364.6 (H) <=100.0 pg/mL   Comprehensive Metabolic Panel (CMP)    Collection Time: 07/16/25  2:10 AM   Result Value Ref Range    Sodium 132 (L) 136 - 145 mmol/L    Potassium 5.0 3.5 - 5.1 mmol/L    Chloride 100 98 - 107 mmol/L    CO2 22 (L) 23 - 31 mmol/L    Glucose 228 (H) 82 - 115 mg/dL    Blood Urea Nitrogen 63.2 (H) 9.8 - 20.1 mg/dL    Creatinine 1.61 (H) 0.55 - 1.02 mg/dL    Calcium 9.3 8.4 - 10.2 mg/dL    Protein Total 6.0 5.8 - 7.6 gm/dL    Albumin 2.9 (L) 3.4 - 4.8 g/dL    Globulin 3.1 2.4 - 3.5 gm/dL    Albumin/Globulin Ratio 0.9 (L) 1.1 - 2.0 ratio    Bilirubin Total 0.4 <=1.5 mg/dL    ALP 62 40 - 150 unit/L    ALT 10 0 - 55 unit/L    AST 15 11 - 45 unit/L    eGFR 32 mL/min/1.73/m2    Anion Gap 10.0 mEq/L    BUN/Creatinine Ratio 39    Magnesium    Collection Time: 07/16/25  2:10 AM   Result Value Ref Range    Magnesium Level 3.20 (H) 1.60 - 2.60 mg/dL   Troponin I    Collection Time: 07/16/25  2:10 AM   Result Value Ref Range    Troponin-I 0.076 (H) 0.000 - 0.045 ng/mL   CBC with Differential    Collection Time: 07/16/25  2:10 AM   Result Value Ref Range    WBC 11.39 4.50 - 11.50 x10(3)/mcL    RBC 3.04 (L) 4.20 - 5.40 x10(6)/mcL     Hgb 8.9 (L) 12.0 - 16.0 g/dL    Hct 28.6 (L) 37.0 - 47.0 %    MCV 94.1 (H) 80.0 - 94.0 fL    MCH 29.3 27.0 - 31.0 pg    MCHC 31.1 (L) 33.0 - 36.0 g/dL    RDW 12.8 11.5 - 17.0 %    Platelet 218 130 - 400 x10(3)/mcL    MPV 9.9 7.4 - 10.4 fL    Neut % 65.8 %    Lymph % 22.4 %    Mono % 9.6 %    Eos % 1.5 %    Basophil % 0.4 %    Imm Grans % 0.3 %    Neut # 7.50 2.1 - 9.2 x10(3)/mcL    Lymph # 2.55 0.6 - 4.6 x10(3)/mcL    Mono # 1.09 0.1 - 1.3 x10(3)/mcL    Eos # 0.17 0 - 0.9 x10(3)/mcL    Baso # 0.05 <=0.2 x10(3)/mcL    Imm Gran # 0.03 0.00 - 0.04 x10(3)/mcL    NRBC% 0.0 %   Iron and TIBC    Collection Time: 07/16/25  2:10 AM   Result Value Ref Range    Iron Binding Capacity Unsaturated 153 70 - 310 ug/dL    Iron Level 38 (L) 50 - 170 ug/dL    Transferrin 164 mg/dL    Iron Binding Capacity Total 191 (L) 250 - 450 ug/dL    Iron Saturation 20 20 - 50 %   Ferritin    Collection Time: 07/16/25  2:10 AM   Result Value Ref Range    Ferritin Level 128.77 4.63 - 204.00 ng/mL   Vitamin B12    Collection Time: 07/16/25  2:10 AM   Result Value Ref Range    Vitamin B12 346 213 - 816 pg/mL       X-Ray Chest 1 View  Result Date: 7/15/2025  EXAMINATION: XR CHEST 1 VIEW CLINICAL HISTORY: weakness;Weakness TECHNIQUE: One view COMPARISON: May 12, 2025. FINDINGS: Cardiopericardial silhouette is within normal limits.  Lungs hypoventilatory changes without convincing dense focal or segmental consolidation, overt congestive process, pleural effusions or pneumothorax.     No acute cardiopulmonary process identified. Electronically signed by: Rafi Li Date:    07/15/2025 Time:    22:25      Imaging personally reviewed by myself and SP.    Assessment / Plan:     80-year-old  female previously known to Dr. Denney, [facility], [facility], [facility], CAD with stent on plavix. Patient presented from nursing home to ER with c/o  of N/V/weakness x3 days. Of note she had endorse  constipation and was seen in urgent Care with the gave her Mag citrate and she had had 2 bowel movements since then.  Patient admitted with weakness, epigastric pain and non-STEMI.    GI consulted for  Nausea/vomiting/epigastric pain/NSAID abuse/melanocytic stools  N/V  Epigastric pain  Acute anemia  N STEMI  Hgb 9.8--8.9  Iron 38, TIBC 1+12, ferritin 128, iron sat 20.- AOCD?  -Continue ppi bid fr now   -Monitor H/H and transfuse as needed to Hgb 8 in setting of CAD  -Monitor stools for bleeding    Will await cardiac eval for NSTEMI. If Ok with cardiology, plan on EGD tomorrow to evaluate above. NPO at MN. Ok for diet from GI standpoint if tolerating but would confirm with cardiology prior to feeding.     Suspect dark stools 2/2 to pepto intake and n/v from Viral illness.  Will need son to give consent.    Thank you for allowing us to participate in this patient's care.  Case and plan of care discussed with MD Jessica Velasquez NP with Dr. Luis Morton MD          [1]   Current Facility-Administered Medications   Medication Dose Route Frequency Provider Last Rate Last Admin    0.9% NaCl infusion   Intravenous Continuous Geo Macias MD 75 mL/hr at 07/16/25 0414 New Bag at 07/16/25 0414    acetaminophen tablet 650 mg  650 mg Oral Q4H PRN Salome Tom FNP        aluminum-magnesium hydroxide-simethicone 200-200-20 mg/5 mL suspension 30 mL  30 mL Oral QID PRN Salome Tom FNP        bisacodyL suppository 10 mg  10 mg Rectal Daily PRN Salome Tom FNP        dextrose 50% injection 12.5 g  12.5 g Intravenous PRN Salome Tom FNP        dextrose 50% injection 12.5 g  12.5 g Intravenous PRGeo Mahoney MD        dextrose 50% injection 25 g  25 g Intravenous PRN Salome Tom FNP        glucagon (human recombinant) injection 1 mg  1 mg Intramuscular PRSalome Rivera FNP        glucagon (human recombinant) injection 1 mg  1 mg Intramuscular PRGeo Mahoney MD        glucose chewable tablet 16  g  16 g Oral PRN Salome Tom FNP        glucose chewable tablet 24 g  24 g Oral PRN Salome Tom FNP        hydrALAZINE injection 10 mg  10 mg Intravenous Q4H PRGeo Mahoney MD        insulin aspart U-100 injection 0-5 Units  0-5 Units Subcutaneous Q6H PRGeo Mahoney MD        labetaloL injection 10 mg  10 mg Intravenous Q4H PRN Geo Macias MD        melatonin tablet 6 mg  6 mg Oral Nightly PRN Salome Tom FNP   6 mg at 07/15/25 2246    pantoprazole EC tablet 40 mg  40 mg Oral BID AC Geo Macias MD   40 mg at 07/16/25 0419    polyethylene glycol packet 17 g  17 g Oral BID PRN Salome Tom FNP        sodium chloride 0.9% flush 10 mL  10 mL Intravenous PRN Salome Tom FNP       [2]   Medications Prior to Admission   Medication Sig Dispense Refill Last Dose/Taking    atorvastatin (LIPITOR) 40 MG tablet Take 40 mg by mouth once daily.   7/14/2025 Evening    carvediloL (COREG) 12.5 MG tablet Take 1 tablet (12.5 mg total) by mouth 2 (two) times daily.   7/14/2025 Evening    clopidogreL (PLAVIX) 75 mg tablet Take 75 mg by mouth once daily.   7/14/2025 Morning    gabapentin (NEURONTIN) 600 MG tablet Take 0.5 tablets (300 mg total) by mouth 3 (three) times daily.   7/14/2025 Evening    isosorbide mononitrate (IMDUR) 30 MG 24 hr tablet Take 30 mg by mouth every morning.   7/14/2025 Morning    levothyroxine (SYNTHROID) 100 MCG tablet 1 tablet in the morning on an empty stomach Orally Once a day for 100 days   7/14/2025 Morning    lisinopriL (PRINIVIL,ZESTRIL) 20 MG tablet Take 1.5 tablets (30 mg total) by mouth once daily.   7/14/2025 Morning    methocarbamoL (ROBAXIN) 750 MG Tab Take 500 mg by mouth 4 (four) times daily.   Past Week    multivitamin with minerals tablet Take 1 tablet by mouth once daily.   7/14/2025 Morning    NOVOLIN R FLEXPEN 100 unit/mL (3 mL) InPn pen Inject 1-10 Units into the skin 3 (three) times daily as needed (Hyperglycemia).   7/14/2025    omeprazole  (PRILOSEC OTC) 20 MG tablet Take 20 mg by mouth once daily.   7/14/2025 Morning    ondansetron (ZOFRAN) 8 MG tablet Take 8 mg by mouth every 8 (eight) hours as needed for Nausea.   7/15/2025 Noon    acetaminophen (TYLENOL) 650 MG TbSR Take 1 tablet (650 mg total) by mouth every 8 (eight) hours as needed (Pain).       cholestyramine-aspartame (CHOLESTYRAMINE LIGHT) 4 gram PwPk Take 1 packet (4 grams of anhydrous cholestyramine total) by mouth daily as needed (Diarrhea).       furosemide (LASIX) 20 MG tablet Take 1 tablet (20 mg total) by mouth daily as needed (In case of swelling, weight gain more than 2 lb overnight). (Patient not taking: Reported on 7/15/2025)   Not Taking    insulin glargine U-100, Lantus, 100 unit/mL injection Inject 18 Units into the skin every evening. Increase by 2 units every other night until fasting CBG is persistently below 120       mirtazapine (REMERON) 15 MG tablet Take 15 mg by mouth every evening. (Patient not taking: Reported on 7/15/2025)   Not Taking

## 2025-07-16 NOTE — H&P
[facility]  Hospital Medicine History & Physical Examination       Patient Name: Gabriela Lozano  MRN: [id number]  Patient Class: IP- Inpatient   Admission Date: 7/15/2025   Admitting Physician: ALLIE Service   Length of Stay: 1  Attending Physician: Geo Macias MD  Primary Care Provider: Angela Primary Doctor  Face-to-Face encounter date: 07/16/2025  Code Status:  Full code  Chief Complaint: Emesis (C/o nausea, vomiting, inc weakness x 3 days. Symptoms worsened this morning. Hx of DM. . GCS 15. Denies CP. )      Screening for Social Drivers for health:  Patient screened for food insecurity, housing instability, transportation needs, utility difficulties, and interpersonal safety (select all that apply as identified as concern)  []Housing or Food  []Transportation Needs  []Utility Difficulties  []Interpersonal safety  [x]None      Patient information was obtained from patient, patient's family, past medical records and ER records.  ED records were reviewed in detail and documented below    HISTORY OF PRESENT ILLNESS:   Gabriela Lozano is a 80 y.o. female who  has a past medical history of CAD (coronary artery disease), Diabetes mellitus, Hypertension, and Hypothyroidism, unspecified.. The patient presented to [facility] on 7/15/2025 with a primary complaint of :    Patient is an 80-year-old female from [place], she has been living in [place] for the last 25 years with her family.    The patient has a history of heart failure with preserved ejection fraction/diabetes type 2 on insulin/hypertension chronic kidney disease anemia of chronic disease/physical deconditioning history of CAD status post PCI x2 in 2017/hyperlipidemia/hypothyroidism and obesity.  Patient presents to [facility] with complaints of nausea vomiting midepigastric abdominal pain weakness.  Patient has a recent admission to [facility] for heart  failure with preserved ejection fraction/deconditioning/poorly controlled hypertension/BRYNN on CKD/bilateral pleural effusions.  At the time cis was consulted, goal-directed medical treatment was initiated.  Eventually the patient improved but require placement.  Also patient has a history of left hip open reduction internal fixation post fall around 2024.  Patient refers daily use of NSAIDs to control left hip pain.  Refers she has had several episodes of nausea and vomiting.  Complains of abdominal pain over midepigastric area as a constant pressure.  Was constipated but eventually had a bowel movement that she describes as black.  H&H at baseline compared to discharge.  Troponins at 0.0 83.  Cis has been consulted.  Hospitalist services consulted for admission.      PAST MEDICAL HISTORY:     Past Medical History:   Diagnosis Date    CAD (coronary artery disease)     Diabetes mellitus     Hypertension     Hypothyroidism, unspecified        PAST SURGICAL HISTORY:     Past Surgical History:   Procedure Laterality Date     SECTION      CORONARY ANGIOPLASTY WITH STENT PLACEMENT      HYSTERECTOMY      RETROGRADE INTRAMEDULLARY RODDING OF DISTAL FEMUR Left 2024    Procedure: INSERTION, INTRAMEDULLARY MARCOS, FEMUR, DISTAL, RETROGRADE;  Surgeon: Godwin Teixiera MD;  Location: [facility];  Service: Orthopedics;  Laterality: Left;       ALLERGIES:   Sitagliptin    FAMILY HISTORY:   Reviewed and negative    SOCIAL HISTORY:     Social History     Tobacco Use    Smoking status: Never    Smokeless tobacco: Never   Substance Use Topics    Alcohol use: Not Currently        HOME MEDICATIONS:     Prior to Admission medications    Medication Sig Start Date End Date Taking? Authorizing Provider   atorvastatin (LIPITOR) 40 MG tablet Take 40 mg by mouth once daily.   Yes Provider, Historical   carvediloL (COREG) 12.5 MG tablet Take 1 tablet (12.5 mg total) by mouth 2 (two) times daily. 25  Yes Orquidea Reese MD    clopidogreL (PLAVIX) 75 mg tablet Take 75 mg by mouth once daily.   Yes Provider, Historical   gabapentin (NEURONTIN) 600 MG tablet Take 0.5 tablets (300 mg total) by mouth 3 (three) times daily. 1/18/25  Yes Orquidea Reese MD   isosorbide mononitrate (IMDUR) 30 MG 24 hr tablet Take 30 mg by mouth every morning. 3/16/25  Yes Provider, Historical   levothyroxine (SYNTHROID) 100 MCG tablet 1 tablet in the morning on an empty stomach Orally Once a day for 100 days 10/1/24  Yes Provider, Historical   lisinopriL (PRINIVIL,ZESTRIL) 20 MG tablet Take 1.5 tablets (30 mg total) by mouth once daily. 5/16/25  Yes Orquidea Reese MD   methocarbamoL (ROBAXIN) 750 MG Tab Take 500 mg by mouth 4 (four) times daily.   Yes Provider, Historical   multivitamin with minerals tablet Take 1 tablet by mouth once daily.   Yes Provider, Historical   NOVOLIN R FLEXPEN 100 unit/mL (3 mL) InPn pen Inject 1-10 Units into the skin 3 (three) times daily as needed (Hyperglycemia). 5/16/25  Yes Orquidea Reese MD   omeprazole (PRILOSEC OTC) 20 MG tablet Take 20 mg by mouth once daily.   Yes Provider, Historical   ondansetron (ZOFRAN) 8 MG tablet Take 8 mg by mouth every 8 (eight) hours as needed for Nausea.   Yes Provider, Historical   acetaminophen (TYLENOL) 650 MG TbSR Take 1 tablet (650 mg total) by mouth every 8 (eight) hours as needed (Pain). 1/18/25   Orquidea Reese MD   cholestyramine-aspartame (CHOLESTYRAMINE LIGHT) 4 gram PwPk Take 1 packet (4 grams of anhydrous cholestyramine total) by mouth daily as needed (Diarrhea). 1/18/25   Orquidea Reese MD   furosemide (LASIX) 20 MG tablet Take 1 tablet (20 mg total) by mouth daily as needed (In case of swelling, weight gain more than 2 lb overnight).  Patient not taking: Reported on 7/15/2025 1/18/25   Orquidea Reese MD   insulin glargine U-100, Lantus, 100 unit/mL injection Inject 18 Units into the skin every evening. Increase by 2 units every other night until fasting CBG is persistently  below 120 5/16/25 6/15/25  Orquidea Reese MD   mirtazapine (REMERON) 15 MG tablet Take 15 mg by mouth every evening.  Patient not taking: Reported on 7/15/2025 12/18/24   Provider, Historical       REVIEW OF SYSTEMS:   Except as documented, all other systems reviewed and negative     PHYSICAL EXAM:     VITAL SIGNS: 24 HRS MIN & MAX LAST   Temp  Min: 97.9 °F (36.6 °C)  Max: 98.2 °F (36.8 °C) 98.2 °F (36.8 °C)   BP  Min: 108/79  Max: 133/59 (!) 133/59   Pulse  Min: 70  Max: 91  70   Resp  Min: 14  Max: 20 14   SpO2  Min: 96 %  Max: 100 % 96 %     General appearance: Well-developed, well-nourished female in no apparent distress.  HENT: Atraumatic head. Moist mucous membranes of oral cavity.  Eyes: Normal extraocular movements.   Neck: Supple.   Lungs: Clear to auscultation bilaterally. No wheezing present.   Heart: Regular rate and rhythm. S1 and S2 present with no murmurs/gallop/rub. No pedal edema. No JVD present.   Abdomen: Soft, non distended, tender mid epigastrium. No rebound tenderness/guarding. Bowel sounds are normal.   Extremities: No cyanosis, clubbing, or edema.  Skin: No Rash.   Neuro: Motor and sensory exams grossly intact. Good tone. Muscle strength 5/5 in all 4 extremities  Psych/mental status: Appropriate mood and affect. Responds appropriately to questions.     LABS AND IMAGING:     Recent Labs   Lab 07/15/25  1938 07/16/25  0210   WBC 11.90* 11.39   RBC 3.38* 3.04*   HGB 9.8* 8.9*   HCT 31.4* 28.6*   MCV 92.9 94.1*   MCH 29.0 29.3   MCHC 31.2* 31.1*   RDW 12.6 12.8    218   MPV 9.7 9.9       Recent Labs   Lab 07/15/25  1938   *   K 5.2*      CO2 21*   BUN 43.3*   CREATININE 1.72*   *   CALCIUM 9.3   MG 2.20   ALBUMIN 3.1*   PROT 6.4   ALKPHOS 72   ALT 11   AST 14   BILITOT 0.5       Microbiology Results (last 7 days)       ** No results found for the last 168 hours. **             X-Ray Chest 1 View  Narrative: EXAMINATION:  XR CHEST 1 VIEW    CLINICAL  HISTORY:  weakness;Weakness    TECHNIQUE:  One view    COMPARISON:  May 12, 2025.    FINDINGS:  Cardiopericardial silhouette is within normal limits.  Lungs hypoventilatory changes without convincing dense focal or segmental consolidation, overt congestive process, pleural effusions or pneumothorax.  Impression: No acute cardiopulmonary process identified.    Electronically signed by: Rafi Li  Date:    07/15/2025  Time:    22:25      ASSESSMENT & PLAN:   1.-nausea/vomiting/epigastric pain/melanocytic stools with daily use of NSAIDs  2.-BRYNN on CKD  3.-non ST-elevation MI, unknown type with a history of CAD/PCI with stents  4.-history of diabetes type 2 on insulin   5.-history of hypertensive vascular disease  6.-history of hyperlipidemia   7.-chronic anemia   8.-deconditioning  9.-hypothyroid   10.-mild hyperkalemia at 5.2 we will give Lokelma 10 g x 1    Plan-we will start patient on Protonix 40 mg IV q.12 hours/consult GI for evaluation.  Stools for occult blood.  Cis has been consulted for elevated troponins with a history of CAD/stents.  We will keep patient NPO.  Insulin sliding scale with Accu-Cheks q.6 hours.  Trend troponins.  Gentle hydration.      Critical care time 45 minutes   Critical care diagnosis includes upper GI bleed requiring Protonix IV/elevated troponins        Please refer patient to Dr. Jonny suarez or Dr. David Orlando upon discharge         VTE Prophylaxis: will be placed on Heparin/Lovenox/ Xarelto/ SCD for DVT prophylaxis and will be advised to be as mobile as possible and sit in a chair as tolerated    Patient condition:  Stable/Fair/Guarded/ Serious/ Critical    __________________________________________________________________________  INPATIENT LIST OF MEDICATIONS     Scheduled Meds:  Continuous Infusions:  PRN Meds:.  Current Facility-Administered Medications:     acetaminophen, 650 mg, Oral, Q4H PRN    aluminum-magnesium hydroxide-simethicone, 30 mL, Oral, QID PRN     bisacodyL, 10 mg, Rectal, Daily PRN    dextrose 50%, 12.5 g, Intravenous, PRN    dextrose 50%, 25 g, Intravenous, PRN    glucagon (human recombinant), 1 mg, Intramuscular, PRN    glucose, 16 g, Oral, PRN    glucose, 24 g, Oral, PRN    melatonin, 6 mg, Oral, Nightly PRN    polyethylene glycol, 17 g, Oral, BID PRN    sodium chloride 0.9%, 10 mL, Intravenous, PRN          Discharge Planning and Disposition: No mobility needs. Ambulating well. Good social support system.   Anticipated discharge    If patient was admitted under observational status it is with my approval/permission.        All diagnosis and differential diagnosis have been reviewed; assessment and plan has been documented; I have personally reviewed the labs and test results that are presently available; I have reviewed the patients medication list; I have reviewed the consulting providers response and recommendations. I have reviewed or attempted to review medical records based upon their availability.    All of the patient and family questions have been addressed and answered. Patient's is agreeable to the above stated plan. I will continue to monitor closely and make adjustments to medical management as needed.    If patient was admitted under observational status it is with my approval/permission.      Geo Macias MD   07/16/2025

## 2025-07-16 NOTE — CARE UPDATE
[facility]  Hospital Medicine Progress Note        Chief Complaint: Inpatient Follow-up     HPI:   Gabriela Lozano is a 80 y.o. female from [place], she has been living in [place] for the last 25 years with her family who  has a past medical history of heart failure with preserved ejection fraction/diabetes type 2 on insulin/hypertension chronic kidney disease anemia of chronic disease/physical deconditioning history of CAD status post PCI x2 in 2017/hyperlipidemia/hypothyroidism and obesity.  Also history of left hip open reduction internal fixation post fall around November 2024.  Patient refers daily use of NSAIDs to control left hip pain.    The patient presented to [facility] on 7/15/2025 with a primary complaint of nausea vomiting midepigastric abdominal pain weakness.    Patient has a recent admission 5/12-5/16/2025 to [facility] for heart failure with preserved ejection fraction/deconditioning/poorly controlled hypertension/BRYNN on CKD/bilateral pleural effusions.  At the time cis was consulted, goal-directed medical treatment was initiated.  Eventually the patient improved but required placement.     Refers she has had several episodes of nausea and vomiting.  Complains of abdominal pain over midepigastric area as a constant pressure.  Was constipated but eventually had a bowel movement that she describes as black.  H&H at baseline compared to discharge.  Troponins at 0.083.  Hospitalist services consulted for admission. Cis and GI has been consulted.     Interval Hx:     AF. Stress test planned. Planned scope in am if cleared by cardiology    Case was discussed with patient's nurse and  on the floor.    Objective/physical exam:  General: In no acute distress, afebrile  Chest: Clear to auscultation bilaterally  Heart: RRR, +S1, S2, no appreciable murmur  Abdomen: Soft, nontender, BS +  MSK: Warm, no lower extremity edema, no  clubbing or cyanosis  Neurologic: Alert and oriented, moving all extremities with good strength    VITAL SIGNS: 24 HRS MIN & MAX LAST   Temp  Min: 97 °F (36.1 °C)  Max: 98.2 °F (36.8 °C) 98.1 °F (36.7 °C)   BP  Min: 108/79  Max: 133/59 114/72   Pulse  Min: 70  Max: 91  87   Resp  Min: 13  Max: 20 20   SpO2  Min: 95 %  Max: 100 % 95 %     I have reviewed the following labs:  Recent Labs   Lab 07/15/25  1938 07/16/25  0210   WBC 11.90* 11.39   RBC 3.38* 3.04*   HGB 9.8* 8.9*   HCT 31.4* 28.6*   MCV 92.9 94.1*   MCH 29.0 29.3   MCHC 31.2* 31.1*   RDW 12.6 12.8    218   MPV 9.7 9.9     Recent Labs   Lab 07/15/25  1938 07/16/25  0210   * 132*   K 5.2* 5.0    100   CO2 21* 22*   BUN 43.3* 63.2*   CREATININE 1.72* 1.61*   * 228*   CALCIUM 9.3 9.3   MG 2.20 3.20*   ALBUMIN 3.1* 2.9*   PROT 6.4 6.0   ALKPHOS 72 62   ALT 11 10   AST 14 15   BILITOT 0.5 0.4     Microbiology Results (last 7 days)       ** No results found for the last 168 hours. **             See below for Radiology    Assessment/Plan:    Concern for upper GI bleed  - nausea/vomiting/epigastric pain/melanocytic stools with daily use of NSAIDs  Anemia of chronic disease  NSTEMI, unspecified  History of CAD status post PCI x2 in 2017   Heart failure with preserved ejection fraction  Type 2 diabetes mellitus   BRYNN vs BRYNN on CKD IIIb  Hyperkalemia    Hx of HTN, HLD, Hypothyroidism, Morbid obesity, Physical deconditioning     CXR No acute cardiopulmonary process identified.     Plan  NPO  Protonix 40 mg IV q.12 hours  Stools for occult blood  GI evaluation  - Suspect dark stools 2/2 to pepto intake and n/v from Viral illness.   - Will await cardiac eval for NSTEMI. If Ok with cardiology, plan on EGD tomorrow to evaluate above. NPO at MN. Ok for diet from GI standpoint if tolerating but would confirm with cardiology prior to feeding.      - transfuse as needed to Hgb 8 in setting of CAD   - Obtain ferritin/iron/TIBC panel. Hold plavix for  now. Ok for ASA 81 mg daily.     elevated troponins with a history of CAD/stents.    Trend troponins. Cards eval  ECHO ; nuclear stress test     BRYNN vs BRYNN on CKD IIIb  NS @75 cc/hr, monitor I&O  mild hyperkalemia at 5.2 >> 5.0 s/p Lokelma 10 g x 2    A1c 5.4, Monitor gluc - prn insulin if needed, hypoglycemia precautions  Resume home meds as appropriate    Refer patient to Dr. Fuller or Dr. David Orlando upon discharge     Critical care note:  Critical care diagnosis: GI bleed requiring Protonix IV  Critical care interventions: Hands-on evaluation, review of labs/radiographs/records and discussion with patient and family if present  Critical care time spent: 35 minutes    VTE prophylaxis: SCDs    Patient condition:  Fair    Anticipated discharge and Disposition:         All diagnosis and differential diagnosis have been reviewed; assessment and plan has been documented; I have personally reviewed the labs and test results that are presently available; I have reviewed the patients medication list; I have reviewed the consulting providers response and recommendations. I have reviewed or attempted to review medical records based upon their availability    All of the patient's questions have been  addressed and answered. Patient's is agreeable to the above stated plan. I will continue to monitor closely and make adjustments to medical management as needed.    Portions of this note dictated using EMR integrated voice recognition software, and may be subject to voice recognition errors not corrected at proofreading. Please contact writer for clarification if needed.   _____________________________________________________________________    Malnutrition Status:  Nutrition consulted. Most recent weight and BMI monitored-     Measurements:  Wt Readings from Last 1 Encounters:   07/16/25 77.1 kg (170 lb)   Body mass index is 34.34 kg/m².    Patient has been screened and assessed by RD.    Malnutrition Type:  Context:     Level:      Malnutrition Characteristic Summary:       Interventions/Recommendations (treatment strategy):        Scheduled Med:   pantoprazole  40 mg Oral BID AC      Continuous Infusions:   0.9% NaCl   Intravenous Continuous 75 mL/hr at 07/16/25 0414 New Bag at 07/16/25 0414      PRN Meds:    Current Facility-Administered Medications:     acetaminophen, 650 mg, Oral, Q4H PRN    aluminum-magnesium hydroxide-simethicone, 30 mL, Oral, QID PRN    bisacodyL, 10 mg, Rectal, Daily PRN    dextrose 50%, 12.5 g, Intravenous, PRN    dextrose 50%, 12.5 g, Intravenous, PRN    dextrose 50%, 25 g, Intravenous, PRN    glucagon (human recombinant), 1 mg, Intramuscular, PRN    glucagon (human recombinant), 1 mg, Intramuscular, PRN    glucose, 16 g, Oral, PRN    glucose, 24 g, Oral, PRN    hydrALAZINE, 10 mg, Intravenous, Q4H PRN    insulin aspart U-100, 0-5 Units, Subcutaneous, Q6H PRN    labetalol, 10 mg, Intravenous, Q4H PRN    melatonin, 6 mg, Oral, Nightly PRN    polyethylene glycol, 17 g, Oral, BID PRN    sodium chloride 0.9%, 10 mL, Intravenous, PRN     Radiology:  I have personally reviewed the following imaging and agree with the radiologist.     X-Ray Chest 1 View  Narrative: EXAMINATION:  XR CHEST 1 VIEW    CLINICAL HISTORY:  weakness;Weakness    TECHNIQUE:  One view    COMPARISON:  May 12, 2025.    FINDINGS:  Cardiopericardial silhouette is within normal limits.  Lungs hypoventilatory changes without convincing dense focal or segmental consolidation, overt congestive process, pleural effusions or pneumothorax.  Impression: No acute cardiopulmonary process identified.    Electronically signed by: Rafi Li  Date:    07/15/2025  Time:    22:25      Suresh Powell MD  Department of Hospital Medicine   [facility]   07/16/2025

## 2025-07-16 NOTE — ED PROVIDER NOTES
Encounter Date: 7/15/2025       History     Chief Complaint   Patient presents with    Emesis     C/o nausea, vomiting, inc weakness x 3 days. Symptoms worsened this morning. Hx of DM. . GCS 15. Denies CP.      The history is provided by the patient. A  was used.     Review of patient's allergies indicates:   Allergen Reactions    Sitagliptin      Past Medical History:   Diagnosis Date    CAD (coronary artery disease)     Diabetes mellitus     Hypertension     Hypothyroidism, unspecified      Past Surgical History:   Procedure Laterality Date     SECTION      CORONARY ANGIOPLASTY WITH STENT PLACEMENT      EGD, WITH CLOSED BIOPSY  2025    Procedure: EGD, WITH CLOSED BIOPSY;  Surgeon: Luis Morton MD;  Location: [facility] ENDOSCOPY;  Service: Endoscopy;;    EGD, WITH HEMORRHAGE CONTROL  2025    Procedure: EGD,WITH HEMORRHAGE CONTROL;  Surgeon: Luis Morton MD;  Location: [facility] ENDOSCOPY;  Service: Endoscopy;;  ultra clip x1    EGD, WITH SUBMUCOSAL INJECTION  2025    Procedure: EGD, WITH SUBMUCOSAL INJECTION;  Surgeon: Luis Morton MD;  Location: [facility] ENDOSCOPY;  Service: Endoscopy;;  Epi Injection 2mls total    ESOPHAGOGASTRODUODENOSCOPY N/A 2025    Procedure: EGD;  Surgeon: Luis Morton MD;  Location: [facility] ENDOSCOPY;  Service: Endoscopy;  Laterality: N/A;    HYSTERECTOMY      RETROGRADE INTRAMEDULLARY RODDING OF DISTAL FEMUR Left 2024    Procedure: INSERTION, INTRAMEDULLARY MARCOS, FEMUR, DISTAL, RETROGRADE;  Surgeon: Godwin Teixeira MD;  Location: [facility];  Service: Orthopedics;  Laterality: Left;     Family History   Problem Relation Name Age of Onset    No Known Problems Mother      No Known Problems Father      No Known Problems Sister      No Known Problems Brother       Social History[1]  Review of Systems   Constitutional:  Negative for chills and fever.   Respiratory:  Negative for cough, chest tightness, shortness  of breath, wheezing and stridor.    Cardiovascular:  Negative for chest pain and palpitations.   Gastrointestinal:  Positive for abdominal pain, constipation, nausea and vomiting. Negative for blood in stool and diarrhea.   Genitourinary:  Negative for dysuria and frequency.   Musculoskeletal:  Negative for arthralgias and myalgias.   Neurological:  Positive for weakness. Negative for dizziness, syncope, light-headedness and headaches.   All other systems reviewed and are negative.      Physical Exam     Initial Vitals [07/15/25 1855]   BP Pulse Resp Temp SpO2   127/75 90 18 97.9 °F (36.6 °C) 100 %      MAP       --         Physical Exam    Vitals reviewed.  Constitutional: She appears well-developed and well-nourished. She is not diaphoretic. No distress.   HENT:   Head: Normocephalic and atraumatic.   Eyes: EOM are normal. Pupils are equal, round, and reactive to light.   Cardiovascular:  Normal rate, regular rhythm, normal heart sounds and intact distal pulses.           Abdominal: Abdomen is soft and flat. Bowel sounds are normal. There is abdominal tenderness in the epigastric area.     There is no rebound, no guarding, no tenderness at McBurney's point and negative Oglesby's sign.   Musculoskeletal:         General: Normal range of motion.     Neurological: She is alert and oriented to person, place, and time.   Skin: Skin is warm and dry.   Psychiatric: She has a normal mood and affect.         ED Course   Procedures  Labs Reviewed   COMPREHENSIVE METABOLIC PANEL - Abnormal       Result Value    Sodium 131 (*)     Potassium 5.2 (*)     Chloride 101      CO2 21 (*)     Glucose 270 (*)     Blood Urea Nitrogen 43.3 (*)     Creatinine 1.72 (*)     Calcium 9.3      Protein Total 6.4      Albumin 3.1 (*)     Globulin 3.3      Albumin/Globulin Ratio 0.9 (*)     Bilirubin Total 0.5      ALP 72      ALT 11      AST 14      eGFR 30      Anion Gap 9.0      BUN/Creatinine Ratio 25     TROPONIN I - Abnormal    Troponin-I  0.083 (*)    CBC WITH DIFFERENTIAL - Abnormal    WBC 11.90 (*)     RBC 3.38 (*)     Hgb 9.8 (*)     Hct 31.4 (*)     MCV 92.9      MCH 29.0      MCHC 31.2 (*)     RDW 12.6      Platelet 260      MPV 9.7      Neut % 78.8      Lymph % 14.9      Mono % 5.3      Eos % 0.4      Basophil % 0.3      Imm Grans % 0.3      Neut # 9.38 (*)     Lymph # 1.77      Mono # 0.63      Eos # 0.05      Baso # 0.03      Imm Gran # 0.04      NRBC% 0.0     B-TYPE NATRIURETIC PEPTIDE - Abnormal    Natriuretic Peptide 364.6 (*)    COMPREHENSIVE METABOLIC PANEL - Abnormal    Sodium 132 (*)     Potassium 5.0      Chloride 100      CO2 22 (*)     Glucose 228 (*)     Blood Urea Nitrogen 63.2 (*)     Creatinine 1.61 (*)     Calcium 9.3      Protein Total 6.0      Albumin 2.9 (*)     Globulin 3.1      Albumin/Globulin Ratio 0.9 (*)     Bilirubin Total 0.4      ALP 62      ALT 10      AST 15      eGFR 32      Anion Gap 10.0      BUN/Creatinine Ratio 39     MAGNESIUM - Abnormal    Magnesium Level 3.20 (*)    TROPONIN I - Abnormal    Troponin-I 0.076 (*)    CBC WITH DIFFERENTIAL - Abnormal    WBC 11.39      RBC 3.04 (*)     Hgb 8.9 (*)     Hct 28.6 (*)     MCV 94.1 (*)     MCH 29.3      MCHC 31.1 (*)     RDW 12.8      Platelet 218      MPV 9.9      Neut % 65.8      Lymph % 22.4      Mono % 9.6      Eos % 1.5      Basophil % 0.4      Imm Grans % 0.3      Neut # 7.50      Lymph # 2.55      Mono # 1.09      Eos # 0.17      Baso # 0.05      Imm Gran # 0.03      NRBC% 0.0     MAGNESIUM - Normal    Magnesium Level 2.20     LIPASE - Normal    Lipase Level 47     CBC W/ AUTO DIFFERENTIAL    Narrative:     The following orders were created for panel order CBC auto differential.  Procedure                               Abnormality         Status                     ---------                               -----------         ------                     CBC with Differential[[id number]]       Abnormal            Final result                 Please view results  for these tests on the individual orders.   CBC W/ AUTO DIFFERENTIAL    Narrative:     The following orders were created for panel order CBC with Automated Differential.  Procedure                               Abnormality         Status                     ---------                               -----------         ------                     CBC with Differential[[id number]]       Abnormal            Final result                 Please view results for these tests on the individual orders.     EKG Readings: (Independently Interpreted)   Initial Reading: No STEMI. Rhythm: Sinus Arrhythmia. Heart Rate: 89. Ectopy: No Ectopy. Conduction: Normal. ST Segments: Normal ST Segments. Clinical Impression: Left Ventricular Hypertrophy (LVH)     ECG Results              EKG 12-lead (Final result)        Collection Time Result Time QRS Duration OHS QTC Calculation    07/15/25 18:56:26 07/15/25 19:53:18 64 425                     Final result by Interface, Lab In [facility] (07/15/25 19:53:26)                   Narrative:    Test Reason : R53.1,    Vent. Rate :  89 BPM     Atrial Rate :  89 BPM     P-R Int : 128 ms          QRS Dur :  64 ms      QT Int : 350 ms       P-R-T Axes :  35   4  57 degrees    QTcB Int : 425 ms    Normal sinus rhythm with sinus arrhythmia  Minimal voltage criteria for LVH, may be normal variant ( R in aVL )  Borderline Abnormal ECG    Confirmed by Corrine Johns ([id number]) on 7/15/2025 7:53:13 PM    Referred By:            Confirmed By: Corrine Johns                                     EKG 12-lead (Final result)  Result time 07/24/25 08:36:26      Final result by Unknown User (07/24/25 08:36:26)                                      Imaging Results              X-Ray Chest 1 View (Final result)  Result time 07/15/25 22:25:40   Procedure changed from X-Ray Chest PA And Lateral     Final result by Rafi Li MD (07/15/25 22:25:40)                   Impression:      No acute cardiopulmonary process  identified.      Electronically signed by: Rafi Li  Date:    07/15/2025  Time:    22:25               Narrative:    EXAMINATION:  XR CHEST 1 VIEW    CLINICAL HISTORY:  weakness;Weakness    TECHNIQUE:  One view    COMPARISON:  May 12, 2025.    FINDINGS:  Cardiopericardial silhouette is within normal limits.  Lungs hypoventilatory changes without convincing dense focal or segmental consolidation, overt congestive process, pleural effusions or pneumothorax.                                       Medications   acetaminophen 1,000 mg/100 mL (10 mg/mL) injection 1,000 mg (has no administration in time range)   LIDOcaine (PF) 20 mg/mL (2%) 20 mg/mL (2 %) injection (has no administration in time range)   ondansetron 4 mg/2 mL injection (has no administration in time range)   enoxaparin injection 80 mg (80 mg Subcutaneous Given 7/15/25 2219)   sodium zirconium cyclosilicate packet 10 g (10 g Oral Given 7/16/25 1326)   kit for Tc 99m-sestamibi no.1 SolR 11 millicurie (11 millicuries Intravenous Given 7/16/25 1132)   perflutren lipid microspheres injection 1 mL (1 mL Intravenous Given 7/16/25 1745)   pantoprazole injection 80 mg (80 mg Intravenous Given 7/17/25 0719)   sodium zirconium cyclosilicate packet 10 g (10 g Oral Given 7/17/25 2050)   propofol (DIPRIVAN) 10 mg/mL IVP injection (  Override pull for Anesthesia 7/17/25 0959)   EPINEPHrine (ADRENALIN) 0.1 mg/mL injection (0.2 mLs Submucosal Injection Given by Provider 7/17/25 1000)     Medical Decision Making  80 year old female with pmhx of HTN, DM, CAD, Hypothyroid presents to ED due to progressive weakness, epigastric pain, nausea, and vomiting x 3 days. States her symptoms worsened this morning. She also endorses some constipation. She was seen at urgent care today where they gave her Mag citrate and she has had 2 BM since.     On exam, pt is in no acute distress, she has not had nausea or vomiting since coming to the ED. She exhibits mild epigastric tenderness on  exam with no guarding, rebound or pain in any other quadrant.     EKG shows sinus arrhythmia with LVH, Troponin elevated at 0.083. White count elevated at 11.9. CXR shows no acute abnormality.     Cardiology recommends one dose of Lovenox, trend troponin and admission    Amount and/or Complexity of Data Reviewed  Labs: ordered.  Radiology: ordered.  Discussion of management or test interpretation with external provider(s): Spoke with Lynda from [facility] who recommends lovenox and serial troponins    Spoke with Salome with  who agrees to admit patient    Discussed patient with Dr. Leon who had face to face time with patients     Risk  Prescription drug management.  Decision regarding hospitalization.    Critical Care  Total time providing critical care: 15 minutes      Additional MDM:   Differential Diagnosis:   Other: The following diagnoses were also considered and will be evaluated: pancreatitis, gastroenteritis and stemi.                                       Clinical Impression:  Final diagnoses:  [R53.1] Weakness  [I21.4] NSTEMI (non-ST elevated myocardial infarction) (Primary)  [R10.13] Epigastric pain          ED Disposition Condition    Admit                     Honey Morales PA-C  07/15/25 [id number]         [1]   Social History  Tobacco Use    Smoking status: Never    Smokeless tobacco: Never   Vaping Use    Vaping status: Never Used   Substance Use Topics    Alcohol use: Not Currently    Drug use: Never        Honey Morales PA-C  07/24/25 1411

## 2025-07-17 ENCOUNTER — ANESTHESIA (OUTPATIENT)
Dept: ENDOSCOPY | Facility: HOSPITAL | Age: 80
DRG: 377 | End: 2025-07-17
Payer: MEDICARE

## 2025-07-17 ENCOUNTER — ANESTHESIA EVENT (OUTPATIENT)
Dept: ENDOSCOPY | Facility: HOSPITAL | Age: 80
DRG: 377 | End: 2025-07-17
Payer: MEDICARE

## 2025-07-17 LAB
ABO + RH BLD: NORMAL
ABO + RH BLD: NORMAL
ALBUMIN SERPL-MCNC: 2.7 G/DL (ref 3.4–4.8)
ALBUMIN/GLOB SERPL: 1 RATIO (ref 1.1–2)
ALP SERPL-CCNC: 51 UNIT/L (ref 40–150)
ALT SERPL-CCNC: 11 UNIT/L (ref 0–55)
ANION GAP SERPL CALC-SCNC: 8 MEQ/L
ANION GAP SERPL CALC-SCNC: 9 MEQ/L
AST SERPL-CCNC: 19 UNIT/L (ref 11–45)
BACTERIA #/AREA URNS AUTO: ABNORMAL /HPF
BASOPHILS # BLD AUTO: 0.03 X10(3)/MCL
BASOPHILS NFR BLD AUTO: 0.2 %
BILIRUB SERPL-MCNC: 0.3 MG/DL
BILIRUB UR QL STRIP.AUTO: NEGATIVE
BLD PROD TYP BPU: NORMAL
BLD PROD TYP BPU: NORMAL
BLOOD UNIT EXPIRATION DATE: NORMAL
BLOOD UNIT EXPIRATION DATE: NORMAL
BLOOD UNIT TYPE CODE: 600
BLOOD UNIT TYPE CODE: 600
BUN SERPL-MCNC: 78 MG/DL (ref 9.8–20.1)
BUN SERPL-MCNC: 91.9 MG/DL (ref 9.8–20.1)
CALCIUM SERPL-MCNC: 7.9 MG/DL (ref 8.4–10.2)
CALCIUM SERPL-MCNC: 8.6 MG/DL (ref 8.4–10.2)
CHLORIDE SERPL-SCNC: 104 MMOL/L (ref 98–107)
CHLORIDE SERPL-SCNC: 108 MMOL/L (ref 98–107)
CLARITY UR: CLEAR
CO2 SERPL-SCNC: 20 MMOL/L (ref 23–31)
CO2 SERPL-SCNC: 23 MMOL/L (ref 23–31)
COLOR UR AUTO: ABNORMAL
CREAT SERPL-MCNC: 1.73 MG/DL (ref 0.55–1.02)
CREAT SERPL-MCNC: 1.92 MG/DL (ref 0.55–1.02)
CREAT/UREA NIT SERPL: 45
CREAT/UREA NIT SERPL: 48
CROSSMATCH INTERPRETATION: NORMAL
CROSSMATCH INTERPRETATION: NORMAL
DISPENSE STATUS: NORMAL
DISPENSE STATUS: NORMAL
EOSINOPHIL # BLD AUTO: 0.36 X10(3)/MCL (ref 0–0.9)
EOSINOPHIL NFR BLD AUTO: 2.6 %
ERYTHROCYTE [DISTWIDTH] IN BLOOD BY AUTOMATED COUNT: 13.4 % (ref 11.5–17)
FOLATE SERPL-MCNC: 18.2 NG/ML (ref 7–31.4)
GFR SERPLBLD CREATININE-BSD FMLA CKD-EPI: 26 ML/MIN/1.73/M2
GFR SERPLBLD CREATININE-BSD FMLA CKD-EPI: 29 ML/MIN/1.73/M2
GLOBULIN SER-MCNC: 2.7 GM/DL (ref 2.4–3.5)
GLUCOSE SERPL-MCNC: 160 MG/DL (ref 82–115)
GLUCOSE SERPL-MCNC: 166 MG/DL (ref 82–115)
GLUCOSE UR QL STRIP: NORMAL
GROUP & RH: NORMAL
HCT VFR BLD AUTO: 17.3 % (ref 37–47)
HGB BLD-MCNC: 5.4 G/DL (ref 12–16)
HGB UR QL STRIP: NEGATIVE
HYPOCHROMIA BLD QL SMEAR: ABNORMAL
IMM GRANULOCYTES # BLD AUTO: 0.07 X10(3)/MCL (ref 0–0.04)
IMM GRANULOCYTES NFR BLD AUTO: 0.5 %
INDIRECT COOMBS: NORMAL
KETONES UR QL STRIP: NEGATIVE
LEUKOCYTE ESTERASE UR QL STRIP: 25
LYMPHOCYTES # BLD AUTO: 3.2 X10(3)/MCL (ref 0.6–4.6)
LYMPHOCYTES NFR BLD AUTO: 23 %
MCH RBC QN AUTO: 29.3 PG (ref 27–31)
MCHC RBC AUTO-ENTMCNC: 31.2 G/DL (ref 33–36)
MCV RBC AUTO: 94 FL (ref 80–94)
MONOCYTES # BLD AUTO: 0.97 X10(3)/MCL (ref 0.1–1.3)
MONOCYTES NFR BLD AUTO: 7 %
MUCOUS THREADS URNS QL MICRO: ABNORMAL /LPF
NEUTROPHILS # BLD AUTO: 9.29 X10(3)/MCL (ref 2.1–9.2)
NEUTROPHILS NFR BLD AUTO: 66.7 %
NITRITE UR QL STRIP: NEGATIVE
NRBC BLD AUTO-RTO: 0 %
PH UR STRIP: 5.5 [PH]
PLATELET # BLD AUTO: 194 X10(3)/MCL (ref 130–400)
PLATELET # BLD EST: NORMAL 10*3/UL
PMV BLD AUTO: 10 FL (ref 7.4–10.4)
POCT GLUCOSE: 155 MG/DL (ref 70–110)
POCT GLUCOSE: 170 MG/DL (ref 70–110)
POCT GLUCOSE: 183 MG/DL (ref 70–110)
POCT GLUCOSE: 210 MG/DL (ref 70–110)
POTASSIUM SERPL-SCNC: 4.7 MMOL/L (ref 3.5–5.1)
POTASSIUM SERPL-SCNC: 5.3 MMOL/L (ref 3.5–5.1)
PROT SERPL-MCNC: 5.4 GM/DL (ref 5.8–7.6)
PROT UR QL STRIP: NEGATIVE
RBC # BLD AUTO: 1.84 X10(6)/MCL (ref 4.2–5.4)
RBC #/AREA URNS AUTO: ABNORMAL /HPF
RBC MORPH BLD: ABNORMAL
SODIUM SERPL-SCNC: 136 MMOL/L (ref 136–145)
SODIUM SERPL-SCNC: 136 MMOL/L (ref 136–145)
SP GR UR STRIP.AUTO: 1.01 (ref 1–1.03)
SPECIMEN OUTDATE: NORMAL
SQUAMOUS #/AREA URNS LPF: ABNORMAL /HPF
UNIT NUMBER: NORMAL
UNIT NUMBER: NORMAL
UROBILINOGEN UR STRIP-ACNC: NORMAL
WBC # BLD AUTO: 13.92 X10(3)/MCL (ref 4.5–11.5)
WBC #/AREA URNS AUTO: ABNORMAL /HPF

## 2025-07-17 PROCEDURE — 36430 TRANSFUSION BLD/BLD COMPNT: CPT

## 2025-07-17 PROCEDURE — 88305 TISSUE EXAM BY PATHOLOGIST: CPT | Performed by: INTERNAL MEDICINE

## 2025-07-17 PROCEDURE — 63600175 PHARM REV CODE 636 W HCPCS: Performed by: INTERNAL MEDICINE

## 2025-07-17 PROCEDURE — 86850 RBC ANTIBODY SCREEN: CPT | Performed by: INTERNAL MEDICINE

## 2025-07-17 PROCEDURE — 0W3P8ZZ CONTROL BLEEDING IN GASTROINTESTINAL TRACT, VIA NATURAL OR ARTIFICIAL OPENING ENDOSCOPIC: ICD-10-PCS | Performed by: INTERNAL MEDICINE

## 2025-07-17 PROCEDURE — 43255 EGD CONTROL BLEEDING ANY: CPT | Mod: XS | Performed by: INTERNAL MEDICINE

## 2025-07-17 PROCEDURE — 36410 VNPNXR 3YR/> PHY/QHP DX/THER: CPT

## 2025-07-17 PROCEDURE — 63600175 PHARM REV CODE 636 W HCPCS: Performed by: NURSE ANESTHETIST, CERTIFIED REGISTERED

## 2025-07-17 PROCEDURE — 82746 ASSAY OF FOLIC ACID SERUM: CPT | Performed by: INTERNAL MEDICINE

## 2025-07-17 PROCEDURE — 86923 COMPATIBILITY TEST ELECTRIC: CPT | Mod: 91 | Performed by: INTERNAL MEDICINE

## 2025-07-17 PROCEDURE — 80053 COMPREHEN METABOLIC PANEL: CPT

## 2025-07-17 PROCEDURE — 37000009 HC ANESTHESIA EA ADD 15 MINS: Performed by: INTERNAL MEDICINE

## 2025-07-17 PROCEDURE — 25000003 PHARM REV CODE 250: Performed by: INTERNAL MEDICINE

## 2025-07-17 PROCEDURE — 25000003 PHARM REV CODE 250: Performed by: NURSE ANESTHETIST, CERTIFIED REGISTERED

## 2025-07-17 PROCEDURE — 36415 COLL VENOUS BLD VENIPUNCTURE: CPT | Performed by: INTERNAL MEDICINE

## 2025-07-17 PROCEDURE — 30233N1 TRANSFUSION OF NONAUTOLOGOUS RED BLOOD CELLS INTO PERIPHERAL VEIN, PERCUTANEOUS APPROACH: ICD-10-PCS | Performed by: INTERNAL MEDICINE

## 2025-07-17 PROCEDURE — 02HV33Z INSERTION OF INFUSION DEVICE INTO SUPERIOR VENA CAVA, PERCUTANEOUS APPROACH: ICD-10-PCS | Performed by: INTERNAL MEDICINE

## 2025-07-17 PROCEDURE — 81001 URINALYSIS AUTO W/SCOPE: CPT

## 2025-07-17 PROCEDURE — 85025 COMPLETE CBC W/AUTO DIFF WBC: CPT

## 2025-07-17 PROCEDURE — 63600175 PHARM REV CODE 636 W HCPCS: Performed by: ANESTHESIOLOGY

## 2025-07-17 PROCEDURE — 21400001 HC TELEMETRY ROOM

## 2025-07-17 PROCEDURE — 88342 IMHCHEM/IMCYTCHM 1ST ANTB: CPT

## 2025-07-17 PROCEDURE — 37000008 HC ANESTHESIA 1ST 15 MINUTES: Performed by: INTERNAL MEDICINE

## 2025-07-17 PROCEDURE — P9016 RBC LEUKOCYTES REDUCED: HCPCS | Performed by: INTERNAL MEDICINE

## 2025-07-17 PROCEDURE — C1751 CATH, INF, PER/CENT/MIDLINE: HCPCS

## 2025-07-17 PROCEDURE — 0DB68ZX EXCISION OF STOMACH, VIA NATURAL OR ARTIFICIAL OPENING ENDOSCOPIC, DIAGNOSTIC: ICD-10-PCS | Performed by: INTERNAL MEDICINE

## 2025-07-17 PROCEDURE — 43239 EGD BIOPSY SINGLE/MULTIPLE: CPT | Performed by: INTERNAL MEDICINE

## 2025-07-17 PROCEDURE — 63600175 PHARM REV CODE 636 W HCPCS

## 2025-07-17 PROCEDURE — 27201423 OPTIME MED/SURG SUP & DEVICES STERILE SUPPLY: Performed by: INTERNAL MEDICINE

## 2025-07-17 RX ORDER — PROPOFOL 10 MG/ML
VIAL (ML) INTRAVENOUS
Status: DISCONTINUED | OUTPATIENT
Start: 2025-07-17 | End: 2025-07-17

## 2025-07-17 RX ORDER — DIPHENHYDRAMINE HYDROCHLORIDE 50 MG/ML
25 INJECTION, SOLUTION INTRAMUSCULAR; INTRAVENOUS EVERY 6 HOURS PRN
Status: DISCONTINUED | OUTPATIENT
Start: 2025-07-17 | End: 2025-07-19 | Stop reason: HOSPADM

## 2025-07-17 RX ORDER — ONDANSETRON HYDROCHLORIDE 2 MG/ML
4 INJECTION, SOLUTION INTRAVENOUS DAILY PRN
Status: DISCONTINUED | OUTPATIENT
Start: 2025-07-17 | End: 2025-07-19 | Stop reason: HOSPADM

## 2025-07-17 RX ORDER — GLUCAGON 1 MG
1 KIT INJECTION
Status: DISCONTINUED | OUTPATIENT
Start: 2025-07-17 | End: 2025-07-19 | Stop reason: HOSPADM

## 2025-07-17 RX ORDER — PROPOFOL 10 MG/ML
VIAL (ML) INTRAVENOUS
Status: COMPLETED
Start: 2025-07-17 | End: 2025-07-17

## 2025-07-17 RX ORDER — PHENYLEPHRINE HCL IN 0.9% NACL 1 MG/10 ML
SYRINGE (ML) INTRAVENOUS
Status: DISCONTINUED | OUTPATIENT
Start: 2025-07-17 | End: 2025-07-17

## 2025-07-17 RX ORDER — SODIUM CHLORIDE 0.9 % (FLUSH) 0.9 %
10 SYRINGE (ML) INJECTION EVERY 12 HOURS PRN
Status: DISCONTINUED | OUTPATIENT
Start: 2025-07-17 | End: 2025-07-19 | Stop reason: HOSPADM

## 2025-07-17 RX ORDER — EPINEPHRINE 0.1 MG/ML
INJECTION INTRAVENOUS
Status: COMPLETED
Start: 2025-07-17 | End: 2025-07-17

## 2025-07-17 RX ORDER — LIDOCAINE HYDROCHLORIDE 10 MG/ML
INJECTION, SOLUTION EPIDURAL; INFILTRATION; INTRACAUDAL; PERINEURAL
Status: DISCONTINUED | OUTPATIENT
Start: 2025-07-17 | End: 2025-07-17

## 2025-07-17 RX ORDER — LIDOCAINE HYDROCHLORIDE 20 MG/ML
INJECTION, SOLUTION EPIDURAL; INFILTRATION; INTRACAUDAL; PERINEURAL
Status: DISPENSED
Start: 2025-07-17 | End: 2025-07-17

## 2025-07-17 RX ORDER — PANTOPRAZOLE SODIUM 40 MG/10ML
80 INJECTION, POWDER, LYOPHILIZED, FOR SOLUTION INTRAVENOUS ONCE
Status: COMPLETED | OUTPATIENT
Start: 2025-07-17 | End: 2025-07-17

## 2025-07-17 RX ORDER — SODIUM CHLORIDE, SODIUM GLUCONATE, SODIUM ACETATE, POTASSIUM CHLORIDE AND MAGNESIUM CHLORIDE 30; 37; 368; 526; 502 MG/100ML; MG/100ML; MG/100ML; MG/100ML; MG/100ML
INJECTION, SOLUTION INTRAVENOUS CONTINUOUS
Status: DISCONTINUED | OUTPATIENT
Start: 2025-07-17 | End: 2025-07-19 | Stop reason: HOSPADM

## 2025-07-17 RX ORDER — PANTOPRAZOLE SODIUM 40 MG/10ML
40 INJECTION, POWDER, LYOPHILIZED, FOR SOLUTION INTRAVENOUS 2 TIMES DAILY
Status: DISCONTINUED | OUTPATIENT
Start: 2025-07-17 | End: 2025-07-19 | Stop reason: HOSPADM

## 2025-07-17 RX ORDER — POLYETHYLENE GLYCOL 3350 17 G/17G
17 POWDER, FOR SOLUTION ORAL DAILY
Status: DISCONTINUED | OUTPATIENT
Start: 2025-07-18 | End: 2025-07-19 | Stop reason: HOSPADM

## 2025-07-17 RX ORDER — HYDROCODONE BITARTRATE AND ACETAMINOPHEN 5; 325 MG/1; MG/1
1 TABLET ORAL EVERY 6 HOURS PRN
Status: DISCONTINUED | OUTPATIENT
Start: 2025-07-17 | End: 2025-07-19 | Stop reason: HOSPADM

## 2025-07-17 RX ORDER — ONDANSETRON HYDROCHLORIDE 2 MG/ML
INJECTION, SOLUTION INTRAVENOUS
Status: DISPENSED
Start: 2025-07-17 | End: 2025-07-17

## 2025-07-17 RX ORDER — HYDROCODONE BITARTRATE AND ACETAMINOPHEN 500; 5 MG/1; MG/1
TABLET ORAL
Status: DISCONTINUED | OUTPATIENT
Start: 2025-07-17 | End: 2025-07-19 | Stop reason: HOSPADM

## 2025-07-17 RX ORDER — LIDOCAINE HYDROCHLORIDE 10 MG/ML
1 INJECTION, SOLUTION EPIDURAL; INFILTRATION; INTRACAUDAL; PERINEURAL ONCE
Status: DISCONTINUED | OUTPATIENT
Start: 2025-07-17 | End: 2025-07-19 | Stop reason: HOSPADM

## 2025-07-17 RX ORDER — ACETAMINOPHEN 10 MG/ML
1000 INJECTION, SOLUTION INTRAVENOUS ONCE
Status: DISPENSED | OUTPATIENT
Start: 2025-07-17 | End: 2025-07-18

## 2025-07-17 RX ADMIN — HYDROCODONE BITARTRATE AND ACETAMINOPHEN 1 TABLET: 5; 325 TABLET ORAL at 11:07

## 2025-07-17 RX ADMIN — PANTOPRAZOLE SODIUM 80 MG: 40 INJECTION, POWDER, FOR SOLUTION INTRAVENOUS at 07:07

## 2025-07-17 RX ADMIN — SODIUM CHLORIDE: 9 INJECTION, SOLUTION INTRAVENOUS at 07:07

## 2025-07-17 RX ADMIN — ATORVASTATIN CALCIUM 40 MG: 40 TABLET, FILM COATED ORAL at 08:07

## 2025-07-17 RX ADMIN — GABAPENTIN 300 MG: 300 CAPSULE ORAL at 08:07

## 2025-07-17 RX ADMIN — HYDROCODONE BITARTRATE AND ACETAMINOPHEN 1 TABLET: 5; 325 TABLET ORAL at 05:07

## 2025-07-17 RX ADMIN — PROPOFOL 50 MG: 10 INJECTION, EMULSION INTRAVENOUS at 09:07

## 2025-07-17 RX ADMIN — PROPOFOL 20 MG: 10 INJECTION, EMULSION INTRAVENOUS at 09:07

## 2025-07-17 RX ADMIN — Medication 100 MCG: at 10:07

## 2025-07-17 RX ADMIN — SODIUM ZIRCONIUM CYCLOSILICATE 10 G: 10 POWDER, FOR SUSPENSION ORAL at 04:07

## 2025-07-17 RX ADMIN — PROPOFOL 10 MG: 10 INJECTION, EMULSION INTRAVENOUS at 09:07

## 2025-07-17 RX ADMIN — SODIUM ZIRCONIUM CYCLOSILICATE 10 G: 10 POWDER, FOR SUSPENSION ORAL at 08:07

## 2025-07-17 RX ADMIN — PANTOPRAZOLE SODIUM 40 MG: 40 INJECTION, POWDER, FOR SOLUTION INTRAVENOUS at 08:07

## 2025-07-17 RX ADMIN — LEVOTHYROXINE SODIUM 100 MCG: 0.1 TABLET ORAL at 05:07

## 2025-07-17 RX ADMIN — PROPOFOL 40 MG: 10 INJECTION, EMULSION INTRAVENOUS at 09:07

## 2025-07-17 RX ADMIN — SODIUM ZIRCONIUM CYCLOSILICATE 10 G: 10 POWDER, FOR SUSPENSION ORAL at 11:07

## 2025-07-17 RX ADMIN — LIDOCAINE HYDROCHLORIDE 100 MG: 10 INJECTION, SOLUTION EPIDURAL; INFILTRATION; INTRACAUDAL; PERINEURAL at 09:07

## 2025-07-17 RX ADMIN — INSULIN ASPART 2 UNITS: 100 INJECTION, SOLUTION INTRAVENOUS; SUBCUTANEOUS at 11:07

## 2025-07-17 RX ADMIN — ONDANSETRON 4 MG: 2 INJECTION INTRAMUSCULAR; INTRAVENOUS at 05:07

## 2025-07-17 RX ADMIN — ONDANSETRON 4 MG: 2 INJECTION INTRAMUSCULAR; INTRAVENOUS at 10:07

## 2025-07-17 RX ADMIN — SODIUM CHLORIDE, SODIUM GLUCONATE, SODIUM ACETATE, POTASSIUM CHLORIDE AND MAGNESIUM CHLORIDE: 526; 502; 368; 37; 30 INJECTION, SOLUTION INTRAVENOUS at 09:07

## 2025-07-17 RX ADMIN — GABAPENTIN 300 MG: 300 CAPSULE ORAL at 07:07

## 2025-07-17 RX ADMIN — GABAPENTIN 300 MG: 300 CAPSULE ORAL at 04:07

## 2025-07-17 RX ADMIN — EPINEPHRINE 0.2 ML: 0.1 INJECTION INTRAVENOUS at 10:07

## 2025-07-17 RX ADMIN — PANTOPRAZOLE SODIUM 40 MG: 40 TABLET, DELAYED RELEASE ORAL at 05:07

## 2025-07-17 NOTE — PROGRESS NOTES
[facility]    Cardiology  Progress Note    Patient Name: Gabriela Lozano  MRN: [id number]  Admission Date: 7/15/2025  Hospital Length of Stay: 2 days  Code Status: Full Code   Attending Physician: Suresh Powell MD   Primary Care Physician: Angela, Primary Doctor  Expected Discharge Date:   Principal Problem:<principal problem not specified>    Subjective:     Brief HPI/Hospital Course:   79-year-old female, known to Dr. Chatterjee (2017), with PMH of CAD s/p PCI to the LAD and RCA in 2017, HF,  HTN, HLD, hypothyroidism, DM2, obesity, who presented to [facility] 7/15/25 with complaints of mid epigastric abdominal pain, weakness, nausea , vomiting , dark black stools. She reported she has been using NSAID's daily for left hip pain.   Upon workup, troponin was 0.083, 0.075, , K 5.2, WBC 11.90, H/H 9.8/31.4, , BUN 43.3, Cr 1.72, CXR No acute cardiopulmonary process identified. [facility] is consulted for elevated troponin.     25: H&H 5.4/17.3; BUN/creat 91/1.9 this am. Bps have been low. Receiving blood presently. No CP or SOB.      PMH: CAD s/p PCI to the LAD and RCA in , HTN, HLD, hypothyroidism, DM2, obesity.  PSH: Coronary stents, , hysterectomy, femur surgery  Family History: Brother-DM 2  Social History: Denied smoking, alcohol and drugs     Previous Cardiac Diagnostics:   US BLE 5.15.25  Negative for deep and superficial vein thrombosis in bilateral lower extremities      Echocardiogram 5.13.25  Left Ventricle: The left ventricle is normal in size. Normal wall thickness. There is normal systolic function with a visually estimated ejection fraction of 60 - 65%. There is normal diastolic function.  Right Ventricle: Systolic function is normal.  Mitral Valve: The mitral valve is structurally normal.  Tricuspid Valve: There is trace regurgitation.  Pulmonary Artery: The estimated pulmonary artery systolic pressure is 22 mmHg.  Pericardium: There is no pericardial  effusion.     [facility] 3/29/17   Left main-normal, lad 90% InStent stenosis stented with drug-eluting stent, mid LAD to distal 70% InStent restenosis stented with drug-eluting stent, RCA stented with drug-eluting stent    Review of Systems   Constitutional: Negative.   HENT: Negative.     Eyes: Negative.    Cardiovascular: Negative.    Respiratory: Negative.     Genitourinary: Negative.      Objective:     Vital Signs (Most Recent):  Temp: 98 °F (36.7 °C) (07/17/25 0446)  Pulse: 89 (07/17/25 0446)  Resp: 18 (07/17/25 0446)  BP: (!) 96/58 (07/17/25 0446)  SpO2: 97 % (07/17/25 0446) Vital Signs (24h Range):  Temp:  [98 °F (36.7 °C)-98.4 °F (36.9 °C)] 98 °F (36.7 °C)  Pulse:  [70-92] 89  Resp:  [18-20] 18  SpO2:  [92 %-100 %] 97 %  BP: ()/(44-94) 96/58   Weight: 77.1 kg (170 lb)  Body mass index is 34.34 kg/m².  SpO2: 97 %       Intake/Output Summary (Last 24 hours) at 7/17/2025 0713  Last data filed at 7/16/2025 1423  Gross per 24 hour   Intake 260 ml   Output 100 ml   Net 160 ml     Lines/Drains/Airways       Peripheral Intravenous Line  Duration             Peripheral IV Single Lumen 07/17/25 0430 20 G Anterior;Proximal;Right Forearm <1 day                    Significant Labs:   Chemistries:   Recent Labs   Lab 07/15/25  1938 07/16/25  0210 07/16/25  0902 07/17/25  0311   * 132*  --  136   K 5.2* 5.0  --  5.3*    100  --  104   CO2 21* 22*  --  23   BUN 43.3* 63.2*  --  91.9*   CREATININE 1.72* 1.61*  --  1.92*   CALCIUM 9.3 9.3  --  8.6   PROT 6.4 6.0  --  5.4*   BILITOT 0.5 0.4  --  0.3   ALKPHOS 72 62  --  51   ALT 11 10  --  11   AST 14 15  --  19   MG 2.20 3.20*  --   --    TROPONINI 0.083* 0.076* 0.056*  --         CBC/Anemia Labs: Coags:    Recent Labs   Lab 07/15/25  1938 07/16/25  0210 07/17/25  0311   WBC 11.90* 11.39 13.92*   HGB 9.8* 8.9* 5.4*   HCT 31.4* 28.6* 17.3*    218 194   MCV 92.9 94.1* 94.0   RDW 12.6 12.8 13.4   IRON  --  38*  --    FERRITIN  --  128.77  --    FOLATE  --    "--  18.2   [id number]  --  346  --     No results for input(s): "PT", "INR", "APTT" in the last 168 hours.     Telemetry:  SR    Physical Exam  Constitutional:       Appearance: Normal appearance.   HENT:      Head: Normocephalic.   Eyes:      Extraocular Movements: Extraocular movements intact.   Cardiovascular:      Rate and Rhythm: Normal rate and regular rhythm.   Pulmonary:      Effort: Pulmonary effort is normal.      Breath sounds: Normal breath sounds.   Neurological:      General: No focal deficit present.      Mental Status: She is alert and oriented to person, place, and time.   Psychiatric:         Mood and Affect: Mood normal.         Behavior: Behavior normal.         Current Schedule Inpatient Medications:   0.9% NaCl   Intravenous Once    atorvastatin  40 mg Oral QHS    gabapentin  300 mg Oral TID    levothyroxine  100 mcg Oral Before breakfast    pantoprazole  40 mg Intravenous BID    pantoprazole  80 mg Intravenous Once     Continuous Infusions:   0.9% NaCl   Intravenous Continuous 75 mL/hr at 07/16/25 2309 New Bag at 07/16/25 2309       Assessment:   NSTEMI , type II  secondary to anemia  Acute Anemia - GI bleed  CAD  -S/p coronary stent placement to the LAD and RCA 3/17  HTN  HLD  DM 2  BRYNN on CKD  DM II  Hypothyroidism  Hyperkalemia     Plan:   Ok to hold Plavix if needed   Cont ASA if possible  DC stress test (anemia); will plan to do as outpatient if necessary   PT moving to [place] next week; son reports he will make appt for cardiology once in [place].       Casey Owens NP  Cardiology  [facility]    Physician Addendum:    Patient's cardiac care is performed as a split-shared visit with HARLEEN d/t complicated medical management as detailed in A/P and associated high acuity requiring physician expertise. I obtained and performed relevant components of history/exam. Medical decision-making is formulated by me. It is a pleasure to care for the " patient.    Impression/Plan:  Anemia - due to GI bleeding  CAD - resume Plavix when appropriate from GI standpoint; try to continue ASA  NSTEMI II - due to anemia    Suggest f/u w/ Cardiology as outpatient when moves to [place]  Restart medical tx for CAD once BP tolerates        Sean Al MD  [facility] Cardiology Team

## 2025-07-17 NOTE — TRANSFER OF CARE
"Anesthesia Transfer of Care Note    Patient: Gabriela Lozano    Procedure(s) Performed: Procedure(s) (LRB):  EGD (N/A)  EGD, WITH SUBMUCOSAL INJECTION  EGD, WITH CLOSED BIOPSY  EGD,WITH HEMORRHAGE CONTROL    Patient location: GI    Anesthesia Type: MAC    Transport from OR: Transported from OR on room air with adequate spontaneous ventilation    Post pain: adequate analgesia    Post assessment: no apparent anesthetic complications    Post vital signs: stable    Level of consciousness: responds to stimulation    Nausea/Vomiting: no nausea/vomiting    Complications: none    Transfer of care protocol was followed      Last vitals: Visit Vitals  /74 (BP Location: Left arm, Patient Position: Lying)   Pulse 84   Temp 36.4 °C (97.5 °F) (Temporal)   Resp 11   Ht 4' 11" (1.499 m)   Wt 77.1 kg (170 lb)   SpO2 97%   BMI 34.34 kg/m²     "

## 2025-07-17 NOTE — PT/OT/SLP PROGRESS
Occupational Therapy  Visit Attempt     Patient Name:  Gabriela Lozano   MRN:  [id number]    Patient not seen today secondary to Off the floor for procedure/surgery (pt currently wtih critically low H&H ( 5.4 and 17.3)- receiving transfusion at this time, as well as off the floor in endo procedure.). Will follow-up as available.    7/17/2025

## 2025-07-17 NOTE — PROCEDURES
"Gabriela Lozano is a 80 y.o. female patient.    Temp: 97.8 °F (36.6 °C) (07/17/25 0742)  Pulse: 78 (07/17/25 0742)  Resp: 16 (07/17/25 0742)  BP: (!) 94/55 (07/17/25 0742)  SpO2: 96 % (07/17/25 0742)  Weight: 77.1 kg (170 lb) (07/16/25 0428)  Height: 4' 11" (149.9 cm) (07/16/25 0428)    PICC  Date/Time: 7/17/2025 7:49 AM  Performed by: Elo Wong RN  Consent Done: Yes  Time out: Immediately prior to procedure a time out was called to verify the correct patient, procedure, equipment, support staff and site/side marked as required  Indications: vascular access  Anesthesia: local infiltration  Local anesthetic: lidocaine 1% without epinephrine  Anesthetic Total (mL): 5  Preparation: skin prepped with ChloraPrep  Skin prep agent dried: skin prep agent completely dried prior to procedure  Sterile barriers: all five maximum sterile barriers used - cap, mask, sterile gown, sterile gloves, and large sterile sheet  Hand hygiene: hand hygiene performed prior to central venous catheter insertion  Location details: left brachial  Catheter type: single lumen  Catheter size: 4 Fr  Catheter Length: 13cm    Ultrasound guidance: yes  Vessel Caliber: medium and patent, compressibility normal  Needle advanced into vessel with real time Ultrasound guidance.  Guidewire confirmed in vessel.  Sterile sheath used.  Number of attempts: 1  Post-procedure: blood return through all ports, sterile dressing applied and chlorhexidine patch    Complications: none  Comments: Arm circumference 31cm          Name ADELA Wong RN  7/17/2025    "

## 2025-07-17 NOTE — PT/OT/SLP PROGRESS
Physical Therapy      Patient Name:  Gabriela Lozano   MRN:  [id number]    PT orders received. Patient not seen today secondary to low H&H 5.4 & 17.3, scheduled blood transfusion. Also EGD today . Will follow-up tomorrow.

## 2025-07-17 NOTE — PROVATION PATIENT INSTRUCTIONS
Discharge Summary/Instructions after an Endoscopic Procedure  Patient Name: Gabriela Lozano  Patient MRN: [id number]  Patient   YOB: 1945  Thursday, July 17, 2025  Luis Morton MD  Dear patient,  As a result of recent federal legislation (The Federal Cures Act), you may   receive lab or pathology results from your procedure in your [facility]   account before your physician is able to contact you. Your physician or   their representative will relay the results to you with their   recommendations at their soonest availability.  Thank you,  RESTRICTIONS:  During your procedure today, you received medications for sedation.  These   medications may affect your judgment, balance and coordination.  Therefore,   for 24 hours, you have the following restrictions:   - DO NOT drive a car, operate machinery, make legal/financial decisions,   sign important papers or drink alcohol.    ACTIVITY:  Today: no heavy lifting, straining or running due to procedural   sedation/anesthesia.  The following day: return to full activity including work.  DIET:  Eat and drink normally unless instructed otherwise.     TREATMENT FOR COMMON SIDE EFFECTS:  - Mild abdominal pain, nausea, belching, bloating or excessive gas:  rest,   eat lightly and use a heating pad.  - Sore Throat: treat with throat lozenges and/or gargle with warm salt   water.  - Because air was used during the procedure, expelling large amounts of air   from your rectum or belching is normal.  - If a bowel prep was taken, you may not have a bowel movement for 1-3 days.    This is normal.  SYMPTOMS TO WATCH FOR AND REPORT TO YOUR PHYSICIAN:  1. Abdominal pain or bloating, other than gas cramps.  2. Chest pain.  3. Back pain.  4. Signs of infection such as: chills or fever occurring within 24 hours   after the procedure.  5. Rectal bleeding, which would show as bright red, maroon, or black stools.   (A tablespoon of blood from the rectum is not serious,  especially if   hemorrhoids are present.)  6. Vomiting.  7. Weakness or dizziness.  GO DIRECTLY TO THE NEAREST EMERGENCY ROOM IF YOU HAVE ANY OF THE FOLLOWING:      Difficulty breathing              Chills and/or fever over 101 F   Persistent vomiting and/or vomiting blood   Severe abdominal pain   Severe chest pain   Black, tarry stools   Bleeding- more than one tablespoon   Any other symptom or condition that you feel may need urgent attention  Your doctor recommends these additional instructions:  If any biopsies were taken, your doctors clinic will contact you in 1 to 2   weeks with any results.  Recommendations:  - Return patient to [facility] for ongoing care.   - Clear liquid diet.   - Continue present medications including IV PPI twice daily for 72 hours   followed by oral PPI twice daily for 8 weeks.   - Await pathology results.   - Monitor for recurrent, overt GI blood loss. If she has recurrent bleeding,   she will need a repeat EGD for a second attempt at hemostasis.   - Avoid all NSAID medications on discharge.   - Repeat EGD in 8 weeks to assess for healing of gastric ulceration.  Impressions:  - Small hiatal hernia viewed from the esophagus and in retroflexion in the   stomach.  - Severe diffuse gastritis, characterized by congestion (edema), erosions,   erythema, friability and deep ulcerations.  Biopsied.   - Cratered non-bleeding gastric ulcer with a nonbleeding visible vessel   (Ayaan Class IIa).  Injected with epinephrine.  Clip was placed.   - Non-bleeding duodenal diverticulum.   80 y.o. female with DM2, CAD s/p PCI, HFpEF, HTN, and hypothyroidism who   presented with epigastric pain with associated nausea, vomiting and melena   in the setting of frequent NSAID use. She was found to have a NSTEMI on   admission as well as severe, acute on chronic anemia requiring PRBC   transfusion. EGD revealed severe, diffuse gastritis as well as a cratered   ulcer in the pre-pyloric region with a  non-bleeding visible vessel which   was treated with epinephrine injection and a hemostatic clip.   For questions, problems or results please call your physician - Luis Morton MD at Work:  [phone].  [facility] ED at [phone]  IF A COMPLICATION OR EMERGENCY SITUATION ARISES AND YOU ARE UNABLE TO REACH   YOUR PHYSICIAN - GO DIRECTLY TO THE EMERGENCY ROOM.  MD Luis John MD  7/17/2025 10:22:56 AM  This report has been verified and signed electronically.  Dear patient,  As a result of recent federal legislation (The Federal Cures Act), you may   receive lab or pathology results from your procedure in your [facility]   account before your physician is able to contact you. Your physician or   their representative will relay the results to you with their   recommendations at their soonest availability.  Thank you,  PROVATION

## 2025-07-17 NOTE — PROGRESS NOTES
[facility]  Hospital Medicine Progress Note        Chief Complaint: Inpatient Follow-up     HPI:   Gabriela Lozano is a 80 y.o. female from [place], she has been living in [place] for the last 25 years with her family who  has a past medical history of heart failure with preserved ejection fraction/diabetes type 2 on insulin/hypertension chronic kidney disease anemia of chronic disease/physical deconditioning history of CAD status post PCI x2 in 2017/hyperlipidemia/hypothyroidism and obesity.  Also history of left hip open reduction internal fixation post fall around November 2024.  Patient refers daily use of NSAIDs to control left hip pain.    The patient presented to [facility] on 7/15/2025 with a primary complaint of nausea vomiting midepigastric abdominal pain weakness.    Patient has a recent admission 5/12-5/16/2025 to [facility] for heart failure with preserved ejection fraction/deconditioning/poorly controlled hypertension/BRYNN on CKD/bilateral pleural effusions.  At the time cis was consulted, goal-directed medical treatment was initiated.  Eventually the patient improved but required placement.     Refers she has had several episodes of nausea and vomiting.  Complains of abdominal pain over midepigastric area as a constant pressure.  Was constipated but eventually had a bowel movement that she describes as black.  H&H at baseline compared to discharge.  Troponins at 0.083.  Hospitalist services consulted for admission. Cis and GI has been consulted.     7/16 AF. Stress test planned. Planned scope in am if cleared by cardiology    Interval Hx:     AF. Stress test was canceled with low HH  Under went EGD today    Case was discussed with patient's nurse and  on the floor.    Objective/physical exam:  General: In no acute distress, afebrile  Chest: Clear to auscultation bilaterally  Heart: RRR, +S1, S2, no appreciable  murmur  Abdomen: Soft, nontender, BS +  MSK: Warm, no lower extremity edema, no clubbing or cyanosis  Neurologic: Alert and oriented, moving all extremities with good strength    VITAL SIGNS: 24 HRS MIN & MAX LAST   Temp  Min: 97.6 °F (36.4 °C)  Max: 98.4 °F (36.9 °C) 98.4 °F (36.9 °C)   BP  Min: 87/44  Max: 150/74 106/64   Pulse  Min: 77  Max: 92  84   Resp  Min: 16  Max: 20 18   SpO2  Min: 92 %  Max: 100 % 99 %     I have reviewed the following labs:  Recent Labs   Lab 07/15/25  1938 07/16/25  0210 07/17/25  0311   WBC 11.90* 11.39 13.92*   RBC 3.38* 3.04* 1.84*   HGB 9.8* 8.9* 5.4*   HCT 31.4* 28.6* 17.3*   MCV 92.9 94.1* 94.0   MCH 29.0 29.3 29.3   MCHC 31.2* 31.1* 31.2*   RDW 12.6 12.8 13.4    218 194   MPV 9.7 9.9 10.0     Recent Labs   Lab 07/15/25  1938 07/16/25  0210 07/17/25  0311   * 132* 136   K 5.2* 5.0 5.3*    100 104   CO2 21* 22* 23   BUN 43.3* 63.2* 91.9*   CREATININE 1.72* 1.61* 1.92*   * 228* 160*   CALCIUM 9.3 9.3 8.6   MG 2.20 3.20*  --    ALBUMIN 3.1* 2.9* 2.7*   PROT 6.4 6.0 5.4*   ALKPHOS 72 62 51   ALT 11 10 11   AST 14 15 19   BILITOT 0.5 0.4 0.3     Microbiology Results (last 7 days)       ** No results found for the last 168 hours. **             See below for Radiology    Assessment/Plan:    Concern for upper GI bleed  - nausea/vomiting/epigastric pain/melanocytic stools with daily use of NSAIDs  Anemia of chronic disease  NSTEMI, unspecified  History of CAD status post PCI x2 in 2017   Heart failure with preserved ejection fraction  Type 2 diabetes mellitus   BRYNN vs BRYNN on CKD IIIb  Hyperkalemia  Leukocytosis    Hx of HTN, HLD, Hypothyroidism, Morbid obesity, Physical deconditioning     CXR No acute cardiopulmonary process identified.     Plan  Protonix 40 mg IV q.12 hours  GI evaluation  Under went EGD 7/17/25 Impression: - Small hiatal hernia viewed from the esophagus and in retroflexion in the stomach. - Severe diffuse gastritis, characterized by congestion  (edema), erosions, erythema, friability and deep ulcerations. Biopsied. - Cratered non-bleeding gastric ulcer with a nonbleeding visible vessel (Ayaan Class IIa). Injected with epinephrine. Clip was placed. - Non-bleeding duodenal diverticulum.   Recommendation: - Clear liquid diet.    - Continue present medications including IV PPI twice daily for 72 hours followed by oral PPI twice daily for 8 weeks.  - Await pathology results.   - Monitor for recurrent, overt GI blood loss. If she has recurrent bleeding, she will need a repeat EGD for a second attempt at hemostasis.   - Avoid all NSAID medications on discharge.   - Repeat EGD in 8 weeks to assess for healing of gastric ulceration.   - transfuse as needed to Hgb 8 in setting of CAD   - Obtain ferritin/iron/TIBC panel. Hold plavix for now. Ok for ASA 81 mg daily.     elevated troponins with a history of CAD/stents.  Trops down trended   Cards  Ok to hold Plavix if needed   Cont ASA if possible  D/C stress test (anemia); will plan to do as outpatient if necessary   PT moving to [place] next week; son reports he will make appt for cardiology once in [place].   ECHO ejection fraction of 60 - 65%. There is diastolic dysfunction.     BRYNN vs BRYNN on CKD IIIb  NS @75 cc/hr, monitor I&O  mild hyperkalemia at 5.2 >> 5.0 >> 5.3, Lokelma ordered    A1c 5.4, Monitor gluc - prn insulin if needed, hypoglycemia precautions  Resume home meds as appropriate    Refer patient to Dr. Fuller or Dr. David Orlando upon discharge     Critical care note:  Critical care diagnosis: GI bleed requiring Protonix IV  Critical care interventions: Hands-on evaluation, review of labs/radiographs/records and discussion with patient and family if present  Critical care time spent: 35 minutes    VTE prophylaxis: SCDs    Patient condition:  Fair    Anticipated discharge and Disposition:         All diagnosis and differential diagnosis have been reviewed; assessment and plan has been documented; I have  personally reviewed the labs and test results that are presently available; I have reviewed the patients medication list; I have reviewed the consulting providers response and recommendations. I have reviewed or attempted to review medical records based upon their availability    All of the patient's questions have been  addressed and answered. Patient's is agreeable to the above stated plan. I will continue to monitor closely and make adjustments to medical management as needed.    Portions of this note dictated using EMR integrated voice recognition software, and may be subject to voice recognition errors not corrected at proofreading. Please contact writer for clarification if needed.   _____________________________________________________________________    Malnutrition Status:  Nutrition consulted. Most recent weight and BMI monitored-     Measurements:  Wt Readings from Last 1 Encounters:   07/16/25 77.1 kg (170 lb)   Body mass index is 34.34 kg/m².    Patient has been screened and assessed by RD.    Malnutrition Type:  Context:    Level:      Malnutrition Characteristic Summary:       Interventions/Recommendations (treatment strategy):        Scheduled Med:   LIDOcaine (PF) 20 mg/mL (2%)        propofol        0.9% NaCl   Intravenous Once    atorvastatin  40 mg Oral QHS    gabapentin  300 mg Oral TID    levothyroxine  100 mcg Oral Before breakfast    pantoprazole  40 mg Intravenous BID    sodium zirconium cyclosilicate  10 g Oral TID      Continuous Infusions:   0.9% NaCl   Intravenous Continuous 75 mL/hr at 07/16/25 2309 New Bag at 07/16/25 2309      PRN Meds:    Current Facility-Administered Medications:     LIDOcaine (PF) 20 mg/mL (2%), , ,     propofol, , ,     0.9%  NaCl infusion (for blood administration), , Intravenous, Q24H PRN    0.9%  NaCl infusion (for blood administration), , Intravenous, Q24H PRN    acetaminophen, 650 mg, Oral, Q4H PRN    aluminum-magnesium hydroxide-simethicone, 30 mL, Oral, QID  PRN    bisacodyL, 10 mg, Rectal, Daily PRN    dextrose 50%, 12.5 g, Intravenous, PRN    dextrose 50%, 12.5 g, Intravenous, PRN    dextrose 50%, 25 g, Intravenous, PRN    glucagon (human recombinant), 1 mg, Intramuscular, PRN    glucagon (human recombinant), 1 mg, Intramuscular, PRN    glucose, 16 g, Oral, PRN    glucose, 24 g, Oral, PRN    hydrALAZINE, 10 mg, Intravenous, Q4H PRN    insulin aspart U-100, 0-5 Units, Subcutaneous, Q6H PRN    kit for Tc 99m-sestamibi no.1, 33 millicurie, Intravenous, ONCE PRN    labetalol, 10 mg, Intravenous, Q4H PRN    melatonin, 6 mg, Oral, Nightly PRN    ondansetron, 4 mg, Intravenous, Q6H PRN    polyethylene glycol, 17 g, Oral, BID PRN    regadenoson, 0.4 mg, Intravenous, ONCE PRN    sodium chloride 0.9%, 10 mL, Intravenous, PRN    Flushing PICC/Midline Protocol, , , Until Discontinued **AND** sodium chloride 0.9%, 10 mL, Intravenous, Q12H PRN     Radiology:  I have personally reviewed the following imaging and agree with the radiologist.     Echo    Left Ventricle: The left ventricle is normal in size. Normal wall   thickness. There is normal systolic function with a visually estimated   ejection fraction of 60 - 65%. There is diastolic dysfunction.    Right Ventricle: Systolic function is normal. TAPSE is 1.7 cm.    Aortic Valve: There is mild aortic valve sclerosis.    Tricuspid Valve: There is trace regurgitation.    Pulmonary Artery: The estimated pulmonary artery systolic pressure is   22 mmHg.    Pericardium: There is no pericardial effusion.      Suresh Powell MD  Department of Hospital Medicine   [facility]   07/17/2025

## 2025-07-17 NOTE — ANESTHESIA PREPROCEDURE EVALUATION
"                                                                                                             07/17/2025  Gabriela Lozano is a 80 y.o., female with epigastric pain and acute blood loss anemia.  EGD today.  PRBC transfusing now.  She was quite weak, but after transfusion she is feeling better.  service used.    "80 y.o. female with CAD s/p PCI, DM2, HTN, hypothyroidism, and HFpEF who presented with epigastric abdominal pain with associated nausea, vomiting, weakness and dark stools. Reportedly has been taking NSAID medications regularly. No prior history of GI bleeding but has been on oral iron for anemia. Hgb 8.9 (near baseline). Mildly elevated troponin as well consistent with NSTEMI. Suspect that her abdominal pain may be related to PUD vs. Gastritis vs. Viral gastroenteritis.      Transfuse PRBCs for Hgb <8 given history of CAD. Obtain ferritin/iron/TIBC panel. Hold plavix for now. Ok for ASA 81 mg daily. Continue PPI. NPO at midnight for EGD. Avoid all NSAID medications on discharge.      Luis Morton MD  [facility] "      Pre-op Assessment    I have reviewed the Patient Summary Reports.     I have reviewed the Nursing Notes. I have reviewed the NPO Status.   I have reviewed the Medications.     Review of Systems  Anesthesia Hx:             Denies Family Hx of Anesthesia complications.    Denies Personal Hx of Anesthesia complications.                    Hematology/Oncology:       -- Anemia:                                  Cardiovascular:     Hypertension   CAD            ESPINOZA                              Pulmonary:      Shortness of breath                  Renal/:  Chronic Renal Disease                Endocrine:  Diabetes Hypothyroidism              Physical Exam  General: Well nourished, Cooperative, Alert and Oriented    Airway:  Mallampati: II   Mouth Opening: Normal  TM Distance: Normal  Tongue: Normal  Neck ROM: Normal ROM    Dental:  Intact, " Periodontal disease    Chest/Lungs:  Normal Respiratory Rate    Heart:  Rate: Normal  Rhythm: Regular Rhythm        Anesthesia Plan  Type of Anesthesia, risks & benefits discussed:    Anesthesia Type: Gen Natural Airway  Intra-op Monitoring Plan: Standard ASA Monitors  Post Op Pain Control Plan: multimodal analgesia  Induction:  IV  Informed Consent: Informed consent signed with the Patient and all parties understand the risks and agree with anesthesia plan.  All questions answered.   ASA Score: 3  Day of Surgery Review of History & Physical: H&P Update referred to the surgeon/provider.    Ready For Surgery From Anesthesia Perspective.     .

## 2025-07-17 NOTE — AI DETERIORATION ALERT
Artificial Intelligence Notification  [facility]  [address]  [place] [address]  Phone: [phone]    This documentation was triggered by an Artificial Intelligence Notification:    Admit Date: 7/15/2025   LOS: 2  Code Status: Full Code  : 1945  Age: 80 y.o.  Weight:   Wt Readings from Last 1 Encounters:   25 77.1 kg (170 lb)        Sex: female  Bed: [address] [place] [address]  MRN: [id number]  Attending Physician: Suresh Powell MD     Date of Alert: 2025  Time AI Alert Received: 448            Vitals:    25 0446   BP: (!) 96/58   Pulse: 89   Resp: 18   Temp: 98 °F (36.7 °C)     SpO2: 97 %      Artificial Intelligence alert discussed with Provider:     Name: DEEPALI Mistry   Date/Time of Provider Notification: 2025 @0455      Patient Condition: Pt with GI bleed being administered PRBC (2 Units) with plans for EGD today. VS stable at present. No ICU interventions at this time. Call with any changes or concerns.

## 2025-07-18 LAB
ABO + RH BLD: NORMAL
ALBUMIN SERPL-MCNC: 2.3 G/DL (ref 3.4–4.8)
ALBUMIN/GLOB SERPL: 1 RATIO (ref 1.1–2)
ALP SERPL-CCNC: 45 UNIT/L (ref 40–150)
ALT SERPL-CCNC: 8 UNIT/L (ref 0–55)
ANION GAP SERPL CALC-SCNC: 6 MEQ/L
AST SERPL-CCNC: 14 UNIT/L (ref 11–45)
BASOPHILS # BLD AUTO: 0.03 X10(3)/MCL
BASOPHILS NFR BLD AUTO: 0.3 %
BILIRUB SERPL-MCNC: 0.5 MG/DL
BLD PROD TYP BPU: NORMAL
BLOOD UNIT EXPIRATION DATE: NORMAL
BLOOD UNIT TYPE CODE: 600
BUN SERPL-MCNC: 73.6 MG/DL (ref 9.8–20.1)
CALCIUM SERPL-MCNC: 7.6 MG/DL (ref 8.4–10.2)
CHLORIDE SERPL-SCNC: 110 MMOL/L (ref 98–107)
CO2 SERPL-SCNC: 20 MMOL/L (ref 23–31)
CREAT SERPL-MCNC: 1.71 MG/DL (ref 0.55–1.02)
CREAT/UREA NIT SERPL: 43
CROSSMATCH INTERPRETATION: NORMAL
DISPENSE STATUS: NORMAL
EOSINOPHIL # BLD AUTO: 0.62 X10(3)/MCL (ref 0–0.9)
EOSINOPHIL NFR BLD AUTO: 7.2 %
ERYTHROCYTE [DISTWIDTH] IN BLOOD BY AUTOMATED COUNT: 16.8 % (ref 11.5–17)
GFR SERPLBLD CREATININE-BSD FMLA CKD-EPI: 30 ML/MIN/1.73/M2
GLOBULIN SER-MCNC: 2.2 GM/DL (ref 2.4–3.5)
GLUCOSE SERPL-MCNC: 137 MG/DL (ref 82–115)
HCT VFR BLD AUTO: 23.1 % (ref 37–47)
HGB BLD-MCNC: 7.2 G/DL (ref 12–16)
IMM GRANULOCYTES # BLD AUTO: 0.05 X10(3)/MCL (ref 0–0.04)
IMM GRANULOCYTES NFR BLD AUTO: 0.6 %
LYMPHOCYTES # BLD AUTO: 2.3 X10(3)/MCL (ref 0.6–4.6)
LYMPHOCYTES NFR BLD AUTO: 26.7 %
MAGNESIUM SERPL-MCNC: 2.4 MG/DL (ref 1.6–2.6)
MCH RBC QN AUTO: 28.6 PG (ref 27–31)
MCHC RBC AUTO-ENTMCNC: 31.2 G/DL (ref 33–36)
MCV RBC AUTO: 91.7 FL (ref 80–94)
MONOCYTES # BLD AUTO: 0.66 X10(3)/MCL (ref 0.1–1.3)
MONOCYTES NFR BLD AUTO: 7.7 %
NEUTROPHILS # BLD AUTO: 4.96 X10(3)/MCL (ref 2.1–9.2)
NEUTROPHILS NFR BLD AUTO: 57.5 %
NRBC BLD AUTO-RTO: 0.2 %
OHS CV CPX 85 PERCENT MAX PREDICTED HEART RATE MALE: 118
OHS CV CPX MAX PREDICTED HEART RATE: 139
OHS CV CPX PATIENT IS FEMALE: 1
OHS CV CPX PATIENT IS MALE: 0
OHS CV INITIAL DOSE: 11 MCG/KG/MIN
PLATELET # BLD AUTO: 97 X10(3)/MCL (ref 130–400)
PMV BLD AUTO: 10.3 FL (ref 7.4–10.4)
POCT GLUCOSE: 169 MG/DL (ref 70–110)
POCT GLUCOSE: 178 MG/DL (ref 70–110)
POCT GLUCOSE: 278 MG/DL (ref 70–110)
POTASSIUM SERPL-SCNC: 4.2 MMOL/L (ref 3.5–5.1)
PROT SERPL-MCNC: 4.5 GM/DL (ref 5.8–7.6)
RBC # BLD AUTO: 2.52 X10(6)/MCL (ref 4.2–5.4)
SODIUM SERPL-SCNC: 136 MMOL/L (ref 136–145)
UNIT NUMBER: NORMAL
WBC # BLD AUTO: 8.62 X10(3)/MCL (ref 4.5–11.5)

## 2025-07-18 PROCEDURE — P9016 RBC LEUKOCYTES REDUCED: HCPCS | Performed by: INTERNAL MEDICINE

## 2025-07-18 PROCEDURE — 25000003 PHARM REV CODE 250: Performed by: INTERNAL MEDICINE

## 2025-07-18 PROCEDURE — 85025 COMPLETE CBC W/AUTO DIFF WBC: CPT | Performed by: INTERNAL MEDICINE

## 2025-07-18 PROCEDURE — 36415 COLL VENOUS BLD VENIPUNCTURE: CPT | Performed by: INTERNAL MEDICINE

## 2025-07-18 PROCEDURE — 83735 ASSAY OF MAGNESIUM: CPT | Performed by: INTERNAL MEDICINE

## 2025-07-18 PROCEDURE — 97162 PT EVAL MOD COMPLEX 30 MIN: CPT

## 2025-07-18 PROCEDURE — 63600175 PHARM REV CODE 636 W HCPCS: Performed by: INTERNAL MEDICINE

## 2025-07-18 PROCEDURE — 86923 COMPATIBILITY TEST ELECTRIC: CPT | Performed by: INTERNAL MEDICINE

## 2025-07-18 PROCEDURE — 80053 COMPREHEN METABOLIC PANEL: CPT | Performed by: INTERNAL MEDICINE

## 2025-07-18 PROCEDURE — 97166 OT EVAL MOD COMPLEX 45 MIN: CPT

## 2025-07-18 PROCEDURE — 21400001 HC TELEMETRY ROOM

## 2025-07-18 RX ORDER — HYDROCODONE BITARTRATE AND ACETAMINOPHEN 500; 5 MG/1; MG/1
TABLET ORAL
Status: DISCONTINUED | OUTPATIENT
Start: 2025-07-18 | End: 2025-07-19 | Stop reason: HOSPADM

## 2025-07-18 RX ADMIN — INSULIN ASPART 2 UNITS: 100 INJECTION, SOLUTION INTRAVENOUS; SUBCUTANEOUS at 10:07

## 2025-07-18 RX ADMIN — INSULIN ASPART 3 UNITS: 100 INJECTION, SOLUTION INTRAVENOUS; SUBCUTANEOUS at 04:07

## 2025-07-18 RX ADMIN — GABAPENTIN 300 MG: 300 CAPSULE ORAL at 03:07

## 2025-07-18 RX ADMIN — LEVOTHYROXINE SODIUM 100 MCG: 0.1 TABLET ORAL at 05:07

## 2025-07-18 RX ADMIN — HYDROCODONE BITARTRATE AND ACETAMINOPHEN 1 TABLET: 5; 325 TABLET ORAL at 09:07

## 2025-07-18 RX ADMIN — POLYETHYLENE GLYCOL 3350 17 G: 17 POWDER, FOR SOLUTION ORAL at 09:07

## 2025-07-18 RX ADMIN — ATORVASTATIN CALCIUM 40 MG: 40 TABLET, FILM COATED ORAL at 07:07

## 2025-07-18 RX ADMIN — GABAPENTIN 300 MG: 300 CAPSULE ORAL at 09:07

## 2025-07-18 RX ADMIN — GABAPENTIN 300 MG: 300 CAPSULE ORAL at 07:07

## 2025-07-18 RX ADMIN — SODIUM CHLORIDE: 9 INJECTION, SOLUTION INTRAVENOUS at 09:07

## 2025-07-18 RX ADMIN — PANTOPRAZOLE SODIUM 40 MG: 40 INJECTION, POWDER, FOR SOLUTION INTRAVENOUS at 09:07

## 2025-07-18 RX ADMIN — HYDROCODONE BITARTRATE AND ACETAMINOPHEN 1 TABLET: 5; 325 TABLET ORAL at 07:07

## 2025-07-18 RX ADMIN — PANTOPRAZOLE SODIUM 40 MG: 40 INJECTION, POWDER, FOR SOLUTION INTRAVENOUS at 07:07

## 2025-07-18 NOTE — PLAN OF CARE
Problem: Adult Inpatient Plan of Care  Goal: Plan of Care Review  Outcome: Progressing  Goal: Patient-Specific Goal (Individualized)  Outcome: Progressing  Goal: Absence of Hospital-Acquired Illness or Injury  Outcome: Progressing  Goal: Optimal Comfort and Wellbeing  Outcome: Progressing  Goal: Readiness for Transition of Care  Outcome: Progressing     Problem: Diabetes Comorbidity  Goal: Blood Glucose Level Within Targeted Range  Outcome: Progressing     Problem: Acute Kidney Injury/Impairment  Goal: Fluid and Electrolyte Balance  Outcome: Progressing  Goal: Improved Oral Intake  Outcome: Progressing  Goal: Effective Renal Function  Outcome: Progressing     Problem: Infection  Goal: Absence of Infection Signs and Symptoms  Outcome: Progressing     Problem: Skin Injury Risk Increased  Goal: Skin Health and Integrity  Outcome: Progressing

## 2025-07-18 NOTE — PT/OT/SLP EVAL
Physical Therapy Evaluation    Patient Name:  Gabriela Lozano   MRN:  [id number]    Recommendations:     Discharge therapy intensity: Low Intensity Therapy   Discharge Equipment Recommendations: none   Barriers to discharge: Impaired mobility and Ongoing medical needs    Assessment:     Gabriela Lozano is a 80 y.o. female admitted with a medical diagnosis of NSTEMI, concern for upper GIB, anemia; PMhx of CAD, HF, L hip ORIF 11/2024.  She presents with the following impairments/functional limitations: weakness, impaired endurance, impaired functional mobility, impaired self care skills, gait instability, impaired balance . Pt up in chair on arrival, required Kash for transfers and CGA for ambulation with RW. Ambulatory distance limited by c/o dizziness. Communication limited 2/2 language barrier and no language line present in room at time of PT evaluation. Reports plan upon d/c is to move to [place] with her son Valentin and his wife who will provide 24/7 assistance. Recommend low intensity therapy at discharge.    DME Justification:  No DME recommended requiring DME justifications    Rehab Prognosis: Good; patient would benefit from acute skilled PT services to address these deficits and reach maximum level of function.    Recent Surgery: Procedure(s) (LRB):  EGD (N/A)  EGD, WITH SUBMUCOSAL INJECTION  EGD, WITH CLOSED BIOPSY  EGD,WITH HEMORRHAGE CONTROL 1 Day Post-Op    Plan:     During this hospitalization, patient would benefit from acute PT services 5 x/week to address the identified rehab impairments via gait training, therapeutic activities, therapeutic exercises, neuromuscular re-education and progress toward the following goals:    Plan of Care Expires:  08/18/25    Subjective     Chief Complaint: upper epigastric pain  Patient/Family Comments/goals: home with son  Pain/Comfort:  Pain Rating 1: 7/10  Location - Orientation 1: upper  Location 1: abdomen  Pain Addressed 1: Reposition, Distraction,  Cessation of Activity  Pain Rating Post-Intervention 1: 7/10    Patients cultural, spiritual, Muslim conflicts given the current situation: no    Living Environment:  Pt currently living with one son, will be going to stay with another son and DIL in [place] upon discharge.  Prior to admission, patients level of function was U[place] with RW, requires supervision for ADLs.  Equipment used at home: walker, rolling.  DME owned (not currently used): none.  Upon discharge, patient will have assistance from sons.    Objective:     Communicated with RN prior to session.  Patient found up in chair with telemetry, pulse ox (continuous), PureWick, peripheral IV  upon PT entry to room.    General Precautions: Standard, fall (Syriac speaking)  Orthopedic Precautions:    Braces:    Respiratory Status: Room air  Blood Pressure: 136/74      Exams:  RLE Strength: WFL  LLE Strength: WFL  Skin integrity: Visible skin intact      Functional Mobility:  Transfers:     Sit to Stand:  minimum assistance with rolling walker  Gait: 20ft with RW with CGA, slow steady pace but c/o dizziness so returned pt to chair  Balance: static standing balance = CGA      AM-PAC 6 CLICK MOBILITY  Total Score:18     Co-Treatment: N/A    Treatment & Education:    Patient provided with verbal education education regarding PT role/goals/POC, fall prevention, safety awareness, and discharge/DME recommendations.  Understanding was verbalized.     Patient left up in chair with all lines intact, call button in reach, and RN notified.      GOALS:   Multidisciplinary Problems       Physical Therapy Goals          Problem: Physical Therapy    Goal Priority Disciplines Outcome Interventions   Physical Therapy Goal     PT, PT/OT Progressing    Description: Goals to be met by: 25     Patient will increase functional independence with mobility by performin. Supine to sit with [place]  2. Sit to stand transfer with Modified [place]  3. Bed to chair  transfer with Modified [place] using Rolling Walker  4. Gait  x 100 feet with Modified [place] using Rolling Walker.                          History:     Past Medical History:   Diagnosis Date    CAD (coronary artery disease)     Diabetes mellitus     Hypertension     Hypothyroidism, unspecified        Past Surgical History:   Procedure Laterality Date     SECTION      CORONARY ANGIOPLASTY WITH STENT PLACEMENT      EGD, WITH CLOSED BIOPSY  2025    Procedure: EGD, WITH CLOSED BIOPSY;  Surgeon: Luis Morton MD;  Location: [facility] ENDOSCOPY;  Service: Endoscopy;;    EGD, WITH HEMORRHAGE CONTROL  2025    Procedure: EGD,WITH HEMORRHAGE CONTROL;  Surgeon: Luis Morton MD;  Location: [facility] ENDOSCOPY;  Service: Endoscopy;;  ultra clip x1    EGD, WITH SUBMUCOSAL INJECTION  2025    Procedure: EGD, WITH SUBMUCOSAL INJECTION;  Surgeon: Luis Morton MD;  Location: [facility] ENDOSCOPY;  Service: Endoscopy;;  Epi Injection 2mls total    ESOPHAGOGASTRODUODENOSCOPY N/A 2025    Procedure: EGD;  Surgeon: Luis Morton MD;  Location: [facility] ENDOSCOPY;  Service: Endoscopy;  Laterality: N/A;    HYSTERECTOMY      RETROGRADE INTRAMEDULLARY RODDING OF DISTAL FEMUR Left 2024    Procedure: INSERTION, INTRAMEDULLARY MARCOS, FEMUR, DISTAL, RETROGRADE;  Surgeon: Godwin Teixeira MD;  Location: [facility];  Service: Orthopedics;  Laterality: Left;       Time Tracking:     PT Received On: 25  PT Start Time: 53     PT Stop Time: 1022  PT Total Time (min): 29 min     Billable Minutes: Evaluation MOD      2025

## 2025-07-18 NOTE — PT/OT/SLP EVAL
Occupational Therapy  Evaluation    Name: Gabriela Lozano  MRN: [id number]  Recent Surgery: Procedure(s) (LRB):  EGD (N/A)  EGD, WITH SUBMUCOSAL INJECTION  EGD, WITH CLOSED BIOPSY  EGD,WITH HEMORRHAGE CONTROL 1 Day Post-Op    Recommendations:     Discharge therapy intensity: Low Intensity Therapy (with family assist PRN)   Discharge Equipment Recommendations:  none  Barriers to discharge:       Assessment:     Gabriela Lozano is a 80 y.o. female with a medical diagnosis of NSTEMI, concern for upper GIB, anemia; PMhx of CAD, HF, L hip ORIF 11/2024.  She presents with the following performance deficits affecting function: weakness, impaired endurance, impaired self care skills, impaired functional mobility, decreased safety awareness.     Pt tolerates initial eval well; mobility limited overall 2/2 urinary incontinence upon standing, saturating brief before therapist able to don over hips. Pt appears near functional baseline and will be living with her son and dtr-in-law in [place] upon d/c. Family will be able to provide 24/7 care. Will continue to follow during acute stay. Rec LOW intensity upon d/c. Pt's grandson at bedside to translate between pt and therapist. * at nurse station notified of pt need for  services; verbally agreed to retrieve equipment for pt room*.     DME Justification: No DME recommended requiring DME justifications    Rehab Prognosis: Good; patient would benefit from acute skilled OT services to address these deficits and reach maximum level of function.       Plan:     Patient to be seen 3 x/week to address the above listed problems via self-care/home management, therapeutic exercises, therapeutic activities  Plan of Care Expires: 08/15/25  Plan of Care Reviewed with: patient, grandchild(shyam)    Subjective     Pt's grandson at bedside to translate between pt and therapist.   * at nurse station notified of pt need for  services; verbally  agreed to retrieve equipment for pt room*.     Chief Complaint: none stated  Patient/Family Comments/goals: go home with son    Occupational Profile:  Living Environment: Pt currently living with one son, will be going to stay with another son and DIL in [place] upon discharge.   Previous level of function: mod (I) with ADLs and RW for mobility   Roles and Routines: mother, grandmother.  Equipment Used at Home: walker, rolling  Assistance upon Discharge: son and dtr-in-law    Pain/Comfort:  Pain Rating 1: 0/10    Patients cultural, spiritual, Latter-day conflicts given the current situation: no    Objective:     OT communicated with RN prior to session.      Patient was found up in chair with peripheral IV, PureWick upon OT entry to room.    General Precautions: Standard, fall (Requires )  Orthopedic Precautions: N/A  Braces: N/A    Vital Signs: Respiratory Status: on room air      Functional Mobility/Transfers:  Transfers: Sit to Stand: contact guard assistance with rolling walker  Bed to Chair: contact guard assistance with rolling walker using Step Transfer  Functional Mobility: mobility limited overall 2/2 urinary incontinence upon standing, saturating brief before therapist able to don over hips.    Activities of Daily Living:  Lower Body Dressing: minimum assistance to thread brief like pants onto BLE, unable to complete donning as pt with urinary incontinence upon standing.   Toileting: maximal assistance pure with in place.     AMPAC 6 Click ADL:  AMPAC Total Score: 21    Functional Cognition:  Intact  Maltese speaking, requiring  services    Visual Perceptual Skills:  Intact    Upper Extremity Function:  Right Upper Extremity:   WFL    Left Upper Extremity:  WFL    Balance:   Dynamic sitting balance:Fair +   Dynamic standing balance:fair + with CGA-Kash and RW.     Therapeutic Positioning  Risk for acquired pressure injuries is increased due to relative decrease in mobility d/t  hospitalization  and incontinence.    OT interventions performed during the course of today's session:   Education was provided on benefits of and recommendations for therapeutic positioning  Therapeutic positioning was provided at the conclusion of session to offload all bony prominences for the prevention and/or reduction of pressure injuries    Skin assessment: all bony prominences were assessed    Findings: no redness or breakdown noted    OT recommendations for therapeutic positioning throughout hospitalization:   Follow [facility] Pressure Injury Prevention Protocol  Geomat recommended for sacral protection while UIC    Co-Treatment: No    Patient Education:  Patient and grandson provided with verbal education education regarding OT role/goals/POC, fall prevention, safety awareness, and Discharge/DME recommendations.  Understanding was verbalized.     Patient left up in chair with all lines intact, call button in reach, and RN notified.    GOALS:   Multidisciplinary Problems       Occupational Therapy Goals          Problem: Occupational Therapy    Goal Priority Disciplines Outcome Interventions   Occupational Therapy Goal     OT, PT/OT Progressing    Description: LTG: Pt will perform basic ADLs and ADL transfers with Modified independence using LRAD by discharge.    STG: to be met by 25    Pt will complete grooming standing at sink with LRAD with SBA.  Pt will complete UB dressing with SBA.  Pt will complete LB dressing with SBA using LRAD.  Pt will complete toileting with SBA using LRAD.  Pt will complete functional mobility to/from toilet and toilet transfer with SBA using LRAD.                        History:     Past Medical History:   Diagnosis Date    CAD (coronary artery disease)     Diabetes mellitus     Hypertension     Hypothyroidism, unspecified          Past Surgical History:   Procedure Laterality Date     SECTION      CORONARY ANGIOPLASTY WITH STENT PLACEMENT      EGD, WITH CLOSED BIOPSY   7/17/2025    Procedure: EGD, WITH CLOSED BIOPSY;  Surgeon: Luis Morton MD;  Location: [facility] ENDOSCOPY;  Service: Endoscopy;;    EGD, WITH HEMORRHAGE CONTROL  7/17/2025    Procedure: EGD,WITH HEMORRHAGE CONTROL;  Surgeon: Luis Morton MD;  Location: [facility] ENDOSCOPY;  Service: Endoscopy;;  ultra clip x1    EGD, WITH SUBMUCOSAL INJECTION  7/17/2025    Procedure: EGD, WITH SUBMUCOSAL INJECTION;  Surgeon: Luis Morton MD;  Location: [facility] ENDOSCOPY;  Service: Endoscopy;;  Epi Injection 2mls total    ESOPHAGOGASTRODUODENOSCOPY N/A 7/17/2025    Procedure: EGD;  Surgeon: Luis Morton MD;  Location: [facility] ENDOSCOPY;  Service: Endoscopy;  Laterality: N/A;    HYSTERECTOMY      RETROGRADE INTRAMEDULLARY RODDING OF DISTAL FEMUR Left 11/27/2024    Procedure: INSERTION, INTRAMEDULLARY MARCOS, FEMUR, DISTAL, RETROGRADE;  Surgeon: Godwin Teixeira MD;  Location: [facility];  Service: Orthopedics;  Laterality: Left;       Time Tracking:     OT Date of Treatment: 07/18/25  OT Start Time: 1207  OT Stop Time: 1230  OT Total Time (min): 23 min    Billable Minutes:Evaluation MOD    7/18/2025

## 2025-07-18 NOTE — PLAN OF CARE
Problem: Occupational Therapy  Goal: Occupational Therapy Goal  Description: LTG: Pt will perform basic ADLs and ADL transfers with Modified independence using LRAD by discharge.    STG: to be met by 8/11/25    Pt will complete grooming standing at sink with LRAD with SBA.  Pt will complete UB dressing with SBA.  Pt will complete LB dressing with SBA using LRAD.  Pt will complete toileting with SBA using LRAD.  Pt will complete functional mobility to/from toilet and toilet transfer with SBA using LRAD.   Outcome: Progressing

## 2025-07-18 NOTE — PROGRESS NOTES
"[facility]   Progress Note          SUBJECTIVE: No BMs today.  In chair, no complaints.  Tolerating CLD.      ROS: Negative unless stated in HPI    MEDS: Reviewed in EMR    OBJECTIVE:  T 97.3 °F (36.3 °C)   BP (!) 150/61   P 69   RR 16   O2 97 %  GENERAL: NAD; does not appear toxic  SKIN: no rash, no jaundice  HEENT: sclera non-icteric; PERRL  NECK: supple; no LAD  CHEST: CTA; nonlabored, equal expansion; no adventitious BS  CARDIOVASCULAR: RRR, S1S2; no murmur   ABDOMEN:  active bowel sounds; abdomen soft, nondistended, nontender to palpation  EXTREMITIES: no cyanosis or clubbing  NEURO: AAO, baseline    Labs:  Recent Labs     07/17/25  0311 07/18/25  0457   WBC 13.92* 8.62   RBC 1.84* 2.52*   HGB 5.4* 7.2*   HCT 17.3* 23.1*   MCV 94.0 91.7   MCH 29.3 28.6   MCHC 31.2* 31.2*   RDW 13.4 16.8    97*     No results for input(s): "LACTIC" in the last 72 hours.  No results for input(s): "INR", "APTT", "D-DIMER" in the last 72 hours.  No results for input(s): "HGBA1C", "CHOL", "TRIG", "LDL", "VLDL", "HDL" in the last 72 hours.   Recent Labs     07/15/25  1938 07/16/25  0210 07/17/25  0311 07/17/25  1855 07/18/25  0457   * 132* 136 136 136   K 5.2* 5.0 5.3* 4.7 4.2   CO2 21* 22* 23 20* 20*   BUN 43.3* 63.2* 91.9* 78.0* 73.6*   CREATININE 1.72* 1.61* 1.92* 1.73* 1.71*   CALCIUM 9.3 9.3 8.6 7.9* 7.6*   MG 2.20 3.20*  --   --  2.40   ALBUMIN 3.1* 2.9* 2.7*  --  2.3*   GLOBULIN 3.3 3.1 2.7  --  2.2*   ALKPHOS 72 62 51  --  45   ALT 11 10 11  --  8   AST 14 15 19  --  14   BILITOT 0.5 0.4 0.3  --  0.5   LIPASE 47  --   --   --   --    FERRITIN  --  128.77  --   --   --    IRON  --  38*  --   --   --    TIBC  --  191*  --   --   --      Recent Labs     07/15/25  1938 07/16/25  0210 07/16/25  0902   .6*  --   --    TROPONINI 0.083* 0.076* 0.056*          Imaging: Reviewed pertinent imaging    ASSESSMENT / PLAN:  80-year-old  female previously known to most [place] " recently seen in clinic by Dr. Odell w/anastacio of anemia, diabetes, hypertension, CAD with stent on plavix. Patient presented from nursing home to ER with c/o  of N/V/weakness x3 days. Of note she had endorse constipation and was seen in urgent Care with the gave her Mag citrate and she had had 2 bowel movements since then.  Patient admitted with weakness, epigastric pain and non-STEMI.     GI consulted for  Nausea/vomiting/epigastric pain/NSAID abuse/melanocytic stools    N/V  Epigastric pain  Acute anemia  NSTEMI    S/p EGD 7/17: Severe diffuse gastritis as well as crater ulcer in the pre-pyloric region with a nonbleeding visible vessel, treated with epinephrine and hemostatic clip.    Await pathology  ADAT to GI soft  Continue PPI IV BID x72hrs, then change to PO and continue BID x8 weeks  Needs repeat EGD in 8 weeks to assess healing - our office to arrange  Avoid all NSAIDs    Will discuss resuming Plavix with MD on rounds  Discussed with patient and primary      Thank you for allowing us to participate in the care of Gabriela Lozano.    Jeanne Morton, FNP  [facility]

## 2025-07-18 NOTE — PROGRESS NOTES
[facility]  Hospital Medicine Progress Note        Chief Complaint: Inpatient Follow-up     HPI:   Gabriela Lozano is a 80 y.o. female from [place], she has been living in [place] for the last 25 years with her family who  has a past medical history of heart failure with preserved ejection fraction/diabetes type 2 on insulin/hypertension chronic kidney disease anemia of chronic disease/physical deconditioning history of CAD status post PCI x2 in 2017/hyperlipidemia/hypothyroidism and obesity.  Also history of left hip open reduction internal fixation post fall around November 2024.  Patient refers daily use of NSAIDs to control left hip pain.    The patient presented to [facility] on 7/15/2025 with a primary complaint of nausea vomiting midepigastric abdominal pain weakness.    Patient has a recent admission 5/12-5/16/2025 to [facility] for heart failure with preserved ejection fraction/deconditioning/poorly controlled hypertension/BRYNN on CKD/bilateral pleural effusions.  At the time cis was consulted, goal-directed medical treatment was initiated.  Eventually the patient improved but required placement.     Refers she has had several episodes of nausea and vomiting.  Complains of abdominal pain over midepigastric area as a constant pressure.  Was constipated but eventually had a bowel movement that she describes as black.  H&H at baseline compared to discharge.  Troponins at 0.083.  Hospitalist services consulted for admission. Cis and GI has been consulted.     7/16 AF. Stress test planned. Planned scope in am if cleared by cardiology  7/17 Stress test was canceled with low HH  Under went EGD 7/17/25 Impression: - Small hiatal hernia viewed from the esophagus and in retroflexion in the stomach. - Severe diffuse gastritis, characterized by congestion (edema), erosions, erythema, friability and deep ulcerations. Biopsied. - Cratered non-bleeding  gastric ulcer with a nonbleeding visible vessel (Ayaan Class IIa). Injected with epinephrine. Clip was placed. - Non-bleeding duodenal diverticulum.     Interval Hx:     AF. Denies pain. NAEON.   1 unit PRBC ordered.    Case was discussed with patient's nurse and  on the floor.    Objective/physical exam:  General: In no acute distress, afebrile  Chest: Clear to auscultation bilaterally  Heart: RRR, +S1, S2, no appreciable murmur  Abdomen: Soft, nontender, BS +  MSK: Warm, no lower extremity edema, no clubbing or cyanosis  Neurologic: Alert and oriented, moving all extremities with good strength    VITAL SIGNS: 24 HRS MIN & MAX LAST   Temp  Min: 97.2 °F (36.2 °C)  Max: 98.4 °F (36.9 °C) 97.9 °F (36.6 °C)   BP  Min: 98/55  Max: 151/56 (!) 125/52   Pulse  Min: 66  Max: 84  69   Resp  Min: 11  Max: 14 14   SpO2  Min: 92 %  Max: 100 % 95 %     I have reviewed the following labs:  Recent Labs   Lab 07/16/25 0210 07/17/25 0311 07/18/25  0457   WBC 11.39 13.92* 8.62   RBC 3.04* 1.84* 2.52*   HGB 8.9* 5.4* 7.2*   HCT 28.6* 17.3* 23.1*   MCV 94.1* 94.0 91.7   MCH 29.3 29.3 28.6   MCHC 31.1* 31.2* 31.2*   RDW 12.8 13.4 16.8    194 97*   MPV 9.9 10.0 10.3     Recent Labs   Lab 07/15/25  1938 07/16/25  0210 07/17/25  0311 07/17/25  1855 07/18/25  0457   * 132* 136 136 136   K 5.2* 5.0 5.3* 4.7 4.2    100 104 108* 110*   CO2 21* 22* 23 20* 20*   BUN 43.3* 63.2* 91.9* 78.0* 73.6*   CREATININE 1.72* 1.61* 1.92* 1.73* 1.71*   * 228* 160* 166* 137*   CALCIUM 9.3 9.3 8.6 7.9* 7.6*   MG 2.20 3.20*  --   --  2.40   ALBUMIN 3.1* 2.9* 2.7*  --  2.3*   PROT 6.4 6.0 5.4*  --  4.5*   ALKPHOS 72 62 51  --  45   ALT 11 10 11  --  8   AST 14 15 19  --  14   BILITOT 0.5 0.4 0.3  --  0.5     Microbiology Results (last 7 days)       ** No results found for the last 168 hours. **             See below for Radiology    Assessment/Plan:    Concern for upper GI bleed  - nausea/vomiting/epigastric  pain/melanocytic stools with daily use of NSAIDs  Anemia of chronic disease  NSTEMI, unspecified  History of CAD status post PCI x2 in 2017   Heart failure with preserved ejection fraction  Type 2 diabetes mellitus   BRYNN vs BRYNN on CKD IIIb  Hyperkalemia  Leukocytosis    Hx of HTN, HLD, Hypothyroidism, Morbid obesity, Physical deconditioning     CXR No acute cardiopulmonary process identified.     Plan  Protonix 40 mg IV q.12 hours  GI evaluation  Under went EGD 7/17/25 Impression: - Small hiatal hernia viewed from the esophagus and in retroflexion in the stomach. - Severe diffuse gastritis, characterized by congestion (edema), erosions, erythema, friability and deep ulcerations. Biopsied. - Cratered non-bleeding gastric ulcer with a nonbleeding visible vessel (Ayaan Class IIa). Injected with epinephrine. Clip was placed. - Non-bleeding duodenal diverticulum.   Recommendation: - Clear liquid diet.    - Continue present medications including IV PPI twice daily for 72 hours followed by oral PPI twice daily for 8 weeks.  - Await pathology results.   - Monitor for recurrent, overt GI blood loss. If she has recurrent bleeding, she will need a repeat EGD for a second attempt at hemostasis.   - Avoid all NSAID medications on discharge.   - Repeat EGD in 8 weeks to assess for healing of gastric ulceration.   - transfuse as needed to Hgb 8 in setting of CAD   - Obtain ferritin/iron/TIBC panel. Hold plavix for now. Ok for ASA 81 mg daily.     elevated troponins with a history of CAD/stents.  Trops down trended   Cards  Ok to hold Plavix if needed   Cont ASA if possible  D/C stress test (anemia); will plan to do as outpatient if necessary   PT moving to [place] next week; son reports he will make appt for cardiology once in [place].   ECHO ejection fraction of 60 - 65%. There is diastolic dysfunction.     BRYNN vs BRYNN on CKD IIIb  NS @75 cc/hr, monitor I&O  mild hyperkalemia at 5.2 >> 5.0 >> 5.3, Lokelma ordered    A1c 5.4, Monitor gluc  - prn insulin if needed, hypoglycemia precautions  Resume home meds as appropriate    Refer patient to Dr. Fuller or Dr. David Orlando upon discharge     Critical care note:  Critical care diagnosis: GI bleed requiring Protonix IV, Anemia requiring transfusion  Critical care interventions: Hands-on evaluation, review of labs/radiographs/records and discussion with patient and family if present  Critical care time spent: 35 minutes    VTE prophylaxis: SCDs    Patient condition:  Fair    Anticipated discharge and Disposition:         All diagnosis and differential diagnosis have been reviewed; assessment and plan has been documented; I have personally reviewed the labs and test results that are presently available; I have reviewed the patients medication list; I have reviewed the consulting providers response and recommendations. I have reviewed or attempted to review medical records based upon their availability    All of the patient's questions have been  addressed and answered. Patient's is agreeable to the above stated plan. I will continue to monitor closely and make adjustments to medical management as needed.    Portions of this note dictated using EMR integrated voice recognition software, and may be subject to voice recognition errors not corrected at proofreading. Please contact writer for clarification if needed.   _____________________________________________________________________    Malnutrition Status:  Nutrition consulted. Most recent weight and BMI monitored-     Measurements:  Wt Readings from Last 1 Encounters:   07/16/25 77.1 kg (170 lb)   Body mass index is 34.34 kg/m².    Patient has been screened and assessed by RD.    Malnutrition Type:  Context:    Level:      Malnutrition Characteristic Summary:       Interventions/Recommendations (treatment strategy):        Scheduled Med:   0.9% NaCl   Intravenous Once    acetaminophen  1,000 mg Intravenous Once    atorvastatin  40 mg Oral QHS     gabapentin  300 mg Oral TID    levothyroxine  100 mcg Oral Before breakfast    LIDOcaine (PF) 10 mg/ml (1%)  1 mL Intradermal Once    pantoprazole  40 mg Intravenous BID    polyethylene glycol  17 g Oral Daily      Continuous Infusions:   0.9% NaCl   Intravenous Continuous 75 mL/hr at 07/17/25 1903 New Bag at 07/17/25 1903    electrolyte-A   Intravenous Continuous          PRN Meds:    Current Facility-Administered Medications:     0.9%  NaCl infusion (for blood administration), , Intravenous, Q24H PRN    0.9%  NaCl infusion (for blood administration), , Intravenous, Q24H PRN    acetaminophen, 650 mg, Oral, Q4H PRN    aluminum-magnesium hydroxide-simethicone, 30 mL, Oral, QID PRN    bisacodyL, 10 mg, Rectal, Daily PRN    dextrose 50%, 12.5 g, Intravenous, PRN    dextrose 50%, 12.5 g, Intravenous, PRN    dextrose 50%, 12.5 g, Intravenous, PRN    dextrose 50%, 25 g, Intravenous, PRN    diphenhydrAMINE, 25 mg, Intravenous, Q6H PRN    glucagon (human recombinant), 1 mg, Intramuscular, PRN    glucagon (human recombinant), 1 mg, Intramuscular, PRN    glucagon (human recombinant), 1 mg, Intramuscular, PRN    glucose, 16 g, Oral, PRN    glucose, 24 g, Oral, PRN    hydrALAZINE, 10 mg, Intravenous, Q4H PRN    HYDROcodone-acetaminophen, 1 tablet, Oral, Q6H PRN    insulin aspart U-100, 0-5 Units, Subcutaneous, Q6H PRN    kit for Tc 99m-sestamibi no.1, 33 millicurie, Intravenous, ONCE PRN    labetalol, 10 mg, Intravenous, Q4H PRN    melatonin, 6 mg, Oral, Nightly PRN    ondansetron, 4 mg, Intravenous, Q6H PRN    ondansetron, 4 mg, Intravenous, Daily PRN    polyethylene glycol, 17 g, Oral, BID PRN    regadenoson, 0.4 mg, Intravenous, ONCE PRN    sodium chloride 0.9%, 10 mL, Intravenous, PRN    Flushing PICC/Midline Protocol, , , Until Discontinued **AND** sodium chloride 0.9%, 10 mL, Intravenous, Q12H PRN     Radiology:  I have personally reviewed the following imaging and agree with the radiologist.     Echo    Left Ventricle:  The left ventricle is normal in size. Normal wall   thickness. There is normal systolic function with a visually estimated   ejection fraction of 60 - 65%. There is diastolic dysfunction.    Right Ventricle: Systolic function is normal. TAPSE is 1.7 cm.    Aortic Valve: There is mild aortic valve sclerosis.    Tricuspid Valve: There is trace regurgitation.    Pulmonary Artery: The estimated pulmonary artery systolic pressure is   22 mmHg.    Pericardium: There is no pericardial effusion.      Suresh Powell MD  Department of Hospital Medicine   [facility]   07/18/2025

## 2025-07-18 NOTE — PLAN OF CARE
Problem: Physical Therapy  Goal: Physical Therapy Goal  Description: Goals to be met by: 25     Patient will increase functional independence with mobility by performin. Supine to sit with [place]  2. Sit to stand transfer with Modified [place]  3. Bed to chair transfer with Modified [place] using Rolling Walker  4. Gait  x 100 feet with Modified [place] using Rolling Walker.     Outcome: Progressing

## 2025-07-19 VITALS
SYSTOLIC BLOOD PRESSURE: 162 MMHG | DIASTOLIC BLOOD PRESSURE: 59 MMHG | HEART RATE: 73 BPM | OXYGEN SATURATION: 99 % | RESPIRATION RATE: 18 BRPM | HEIGHT: 59 IN | BODY MASS INDEX: 34.27 KG/M2 | TEMPERATURE: 98 F | WEIGHT: 170 LBS

## 2025-07-19 PROBLEM — K92.2 GI BLEED: Status: ACTIVE | Noted: 2025-07-19

## 2025-07-19 LAB
ANION GAP SERPL CALC-SCNC: 5 MEQ/L
BUN SERPL-MCNC: 53.5 MG/DL (ref 9.8–20.1)
CALCIUM SERPL-MCNC: 7.9 MG/DL (ref 8.4–10.2)
CHLORIDE SERPL-SCNC: 110 MMOL/L (ref 98–107)
CO2 SERPL-SCNC: 23 MMOL/L (ref 23–31)
CREAT SERPL-MCNC: 1.46 MG/DL (ref 0.55–1.02)
CREAT/UREA NIT SERPL: 37
ERYTHROCYTE [DISTWIDTH] IN BLOOD BY AUTOMATED COUNT: 16.9 % (ref 11.5–17)
GFR SERPLBLD CREATININE-BSD FMLA CKD-EPI: 36 ML/MIN/1.73/M2
GLUCOSE SERPL-MCNC: 169 MG/DL (ref 82–115)
HCT VFR BLD AUTO: 28.5 % (ref 37–47)
HGB BLD-MCNC: 9.1 G/DL (ref 12–16)
MCH RBC QN AUTO: 28.4 PG (ref 27–31)
MCHC RBC AUTO-ENTMCNC: 31.9 G/DL (ref 33–36)
MCV RBC AUTO: 89.1 FL (ref 80–94)
NRBC BLD AUTO-RTO: 0.2 %
PLATELET # BLD AUTO: 118 X10(3)/MCL (ref 130–400)
PMV BLD AUTO: 11.2 FL (ref 7.4–10.4)
POCT GLUCOSE: 236 MG/DL (ref 70–110)
POTASSIUM SERPL-SCNC: 4.4 MMOL/L (ref 3.5–5.1)
RBC # BLD AUTO: 3.2 X10(6)/MCL (ref 4.2–5.4)
SODIUM SERPL-SCNC: 138 MMOL/L (ref 136–145)
WBC # BLD AUTO: 8.2 X10(3)/MCL (ref 4.5–11.5)

## 2025-07-19 PROCEDURE — 80048 BASIC METABOLIC PNL TOTAL CA: CPT | Performed by: INTERNAL MEDICINE

## 2025-07-19 PROCEDURE — 63600175 PHARM REV CODE 636 W HCPCS: Performed by: INTERNAL MEDICINE

## 2025-07-19 PROCEDURE — 25000003 PHARM REV CODE 250: Performed by: INTERNAL MEDICINE

## 2025-07-19 PROCEDURE — 36415 COLL VENOUS BLD VENIPUNCTURE: CPT | Performed by: INTERNAL MEDICINE

## 2025-07-19 PROCEDURE — 85027 COMPLETE CBC AUTOMATED: CPT | Performed by: INTERNAL MEDICINE

## 2025-07-19 RX ORDER — POLYETHYLENE GLYCOL 3350 17 G/17G
17 POWDER, FOR SOLUTION ORAL 2 TIMES DAILY
Qty: 60 EACH | Refills: 0 | Status: SHIPPED | OUTPATIENT
Start: 2025-07-19

## 2025-07-19 RX ORDER — PANTOPRAZOLE SODIUM 40 MG/1
40 TABLET, DELAYED RELEASE ORAL 2 TIMES DAILY
Qty: 120 TABLET | Refills: 0 | Status: SHIPPED | OUTPATIENT
Start: 2025-07-19 | End: 2025-09-17

## 2025-07-19 RX ORDER — ASPIRIN 650 MG
81 TABLET, DELAYED RELEASE (ENTERIC COATED) ORAL DAILY
Status: ON HOLD | COMMUNITY
End: 2025-07-19 | Stop reason: CLARIF

## 2025-07-19 RX ORDER — ASPIRIN 81 MG/1
81 TABLET ORAL DAILY
COMMUNITY

## 2025-07-19 RX ORDER — HYDROCODONE BITARTRATE AND ACETAMINOPHEN 5; 325 MG/1; MG/1
1 TABLET ORAL EVERY 8 HOURS PRN
Qty: 12 TABLET | Refills: 0 | Status: SHIPPED | OUTPATIENT
Start: 2025-07-19

## 2025-07-19 RX ADMIN — LEVOTHYROXINE SODIUM 100 MCG: 0.1 TABLET ORAL at 06:07

## 2025-07-19 RX ADMIN — PANTOPRAZOLE SODIUM 40 MG: 40 INJECTION, POWDER, FOR SOLUTION INTRAVENOUS at 08:07

## 2025-07-19 RX ADMIN — POLYETHYLENE GLYCOL 3350 17 G: 17 POWDER, FOR SOLUTION ORAL at 08:07

## 2025-07-19 RX ADMIN — SODIUM CHLORIDE: 9 INJECTION, SOLUTION INTRAVENOUS at 12:07

## 2025-07-19 RX ADMIN — GABAPENTIN 300 MG: 300 CAPSULE ORAL at 08:07

## 2025-07-19 RX ADMIN — GABAPENTIN 300 MG: 300 CAPSULE ORAL at 03:07

## 2025-07-19 NOTE — PROGRESS NOTES
Discharge instructions given to patient and patient's family. Medication list and follow up appointments reviewed, all questions answered, patient to schedule follow ups in [place]. Patient's IV and heart monitor removed. Patient discharged home with family.

## 2025-07-19 NOTE — DISCHARGE SUMMARY
[facility]  Hospital Medicine Discharge Summary    Admit Date: 7/15/2025  Discharge Date and Time: 7/19/20252:24 PM  Admitting Physician: ALLIE Team  Discharging Physician: Suresh Powell MD.  Primary Care Physician: Angela, Primary Doctor  Consults: Cardiology and Gastroenterology    Discharge Diagnoses:    Upper GI bleed 2/2 gastric ulcer  - nausea/vomiting/epigastric pain/melanocytic stools with daily use of NSAIDs  - EGD 7/17/25 Impression: - Small hiatal hernia viewed from the esophagus and in retroflexion in the stomach. - Severe diffuse gastritis, characterized by congestion (edema), erosions, erythema, friability and deep ulcerations. Biopsied. - Cratered non-bleeding gastric ulcer with a nonbleeding visible vessel (Ayaan Class IIa). Injected with epinephrine. Clip was placed. - Non-bleeding duodenal diverticulum.   Anemia of chronic disease  NSTEMI, unspecified  History of CAD status post PCI x2 in 2017   Heart failure with preserved ejection fraction  Type 2 diabetes mellitus   BRYNN vs BRYNN on CKD IIIb  Hyperkalemia  Leukocytosis     Hx of HTN, HLD, Hypothyroidism, Morbid obesity, Physical deconditioning     Hospital Course:     Gabriela Lozano is a 80 y.o. female from [place], she has been living in [place] for the last 25 years with her family who  has a past medical history of heart failure with preserved ejection fraction/diabetes type 2 on insulin/hypertension chronic kidney disease anemia of chronic disease/physical deconditioning history of CAD status post PCI x2 in 2017/hyperlipidemia/hypothyroidism and obesity.  Also history of left hip open reduction internal fixation post fall around November 2024.  Patient refers daily use of NSAIDs to control left hip pain.     The patient presented to [facility] on 7/15/2025 with a primary complaint of nausea vomiting midepigastric abdominal pain weakness.     Patient has a recent admission 5/12-5/16/2025 to [facility] for heart failure with preserved ejection fraction/deconditioning/poorly controlled hypertension/BRYNN on CKD/bilateral pleural effusions.  At the time cis was consulted, goal-directed medical treatment was initiated.  Eventually the patient improved but required placement.      Refers she has had several episodes of nausea and vomiting.  Complains of abdominal pain over midepigastric area as a constant pressure.  Was constipated but eventually had a bowel movement that she describes as black.  H&H at baseline compared to discharge.  Troponins at 0.083.  Hospitalist services consulted for admission. Cis and GI has been consulted.  7/16 AF. Stress test planned. Planned scope in am if cleared by cardiology. 7/17 Stress test was canceled with low Hb    Cards  Ok to hold Plavix if needed   Cont ASA if possible  D/C stress test (anemia); will plan to do as outpatient if necessary   PT moving to [place] next week; son reports he will make appt for cardiology once in [place].   ECHO ejection fraction of 60 - 65%. There is diastolic dysfunction.     GI evaluation  Under went EGD 7/17/25 Impression: - Small hiatal hernia viewed from the esophagus and in retroflexion in the stomach. - Severe diffuse gastritis, characterized by congestion (edema), erosions, erythema, friability and deep ulcerations. Biopsied. - Cratered non-bleeding gastric ulcer with a nonbleeding visible vessel (Ayaan Class IIa). Injected with epinephrine. Clip was placed. - Non-bleeding duodenal diverticulum.   - Continue present medications including IV PPI twice daily for 72 hours followed by oral PPI twice daily for 8 weeks.  - Await pathology results.   - Monitor for recurrent, overt GI blood loss. If she has recurrent bleeding, she will need a repeat EGD for a second attempt at hemostasis.   - Avoid all NSAID medications on discharge. (Norco ordered for chronic pain for now)  - Repeat EGD in 8 weeks to assess for healing of gastric  ulceration.   - transfuse as needed to Hgb 8 in setting of CAD   7/18 1 unit PRBC ordered to keep Hb >8    BRYNN vs BRYNN on CKD IIIb, improving will need follow up. Hold lisinopril and coreg for now given low BP however will need to resume based on outpatient follow up and BP trend.   GI cleared for discharge and okay'ed to restart both aspirin and plavix if needed.     Patient being discharged with medications and follow up instructions as below.    Discussed regarding ER precautions and when to return to ER if needed.  Labs in 1 week following discharge with primary care physician.    Pt was seen and examined on the day of discharge  Vitals:  VITAL SIGNS: 24 HRS MIN & MAX LAST   Temp  Min: 97.5 °F (36.4 °C)  Max: 98.1 °F (36.7 °C) 97.9 °F (36.6 °C)   BP  Min: 103/39  Max: 162/59 (!) 162/59   Pulse  Min: 57  Max: 73  73   Resp  Min: 16  Max: 18 18   SpO2  Min: 95 %  Max: 99 % 99 %       Physical Exam:  General: In no acute distress, afebrile  Chest: Clear to auscultation bilaterally  Heart: RRR, +S1, S2, no appreciable murmur  Abdomen: Soft, nontender, BS +  MSK: Warm, no lower extremity edema, no clubbing or cyanosis  Neurologic: Alert and oriented, moving all extremities with good strength    Procedures Performed: No admission procedures for hospital encounter.     Significant Diagnostic Studies: See Full reports for all details    Recent Labs   Lab 07/17/25 0311 07/18/25  0457 07/19/25  0638   WBC 13.92* 8.62 8.20   RBC 1.84* 2.52* 3.20*   HGB 5.4* 7.2* 9.1*   HCT 17.3* 23.1* 28.5*   MCV 94.0 91.7 89.1   MCH 29.3 28.6 28.4   MCHC 31.2* 31.2* 31.9*   RDW 13.4 16.8 16.9    97* 118*   MPV 10.0 10.3 11.2*       Recent Labs   Lab 07/15/25  1938 07/16/25  0210 07/17/25  0311 07/17/25  1855 07/18/25  0457 07/19/25  0638   * 132* 136 136 136 138   K 5.2* 5.0 5.3* 4.7 4.2 4.4    100 104 108* 110* 110*   CO2 21* 22* 23 20* 20* 23   BUN 43.3* 63.2* 91.9* 78.0* 73.6* 53.5*   CREATININE 1.72* 1.61* 1.92*  1.73* 1.71* 1.46*   * 228* 160* 166* 137* 169*   CALCIUM 9.3 9.3 8.6 7.9* 7.6* 7.9*   MG 2.20 3.20*  --   --  2.40  --    ALBUMIN 3.1* 2.9* 2.7*  --  2.3*  --    PROT 6.4 6.0 5.4*  --  4.5*  --    ALKPHOS 72 62 51  --  45  --    ALT 11 10 11  --  8  --    AST 14 15 19  --  14  --    BILITOT 0.5 0.4 0.3  --  0.5  --         Microbiology Results (last 7 days)       ** No results found for the last 168 hours. **             Echo    Left Ventricle: The left ventricle is normal in size. Normal wall   thickness. There is normal systolic function with a visually estimated   ejection fraction of 60 - 65%. There is diastolic dysfunction.    Right Ventricle: Systolic function is normal. TAPSE is 1.7 cm.    Aortic Valve: There is mild aortic valve sclerosis.    Tricuspid Valve: There is trace regurgitation.    Pulmonary Artery: The estimated pulmonary artery systolic pressure is   22 mmHg.    Pericardium: There is no pericardial effusion.         Medication List        START taking these medications      HYDROcodone-acetaminophen 5-325 mg per tablet  Commonly known as: NORCO  Take 1 tablet by mouth every 8 (eight) hours as needed for Pain.     pantoprazole 40 MG tablet  Commonly known as: PROTONIX  Take 1 tablet (40 mg total) by mouth 2 (two) times daily.     polyethylene glycol 17 gram Pwpk  Commonly known as: GLYCOLAX  Take 17 g by mouth 2 (two) times daily.            CONTINUE taking these medications      aspirin 81 MG EC tablet  Commonly known as: ECOTRIN     atorvastatin 40 MG tablet  Commonly known as: LIPITOR     cholestyramine-aspartame 4 gram Pwpk  Commonly known as: CHOLESTYRAMINE LIGHT  Take 1 packet (4 grams of anhydrous cholestyramine total) by mouth daily as needed (Diarrhea).     clopidogreL 75 mg tablet  Commonly known as: PLAVIX     gabapentin 600 MG tablet  Commonly known as: NEURONTIN  Take 0.5 tablets (300 mg total) by mouth 3 (three) times daily.     insulin glargine U-100 (Lantus) 100 unit/mL  injection  Inject 18 Units into the skin every evening. Increase by 2 units every other night until fasting CBG is persistently below 120     isosorbide mononitrate 30 MG 24 hr tablet  Commonly known as: IMDUR     levothyroxine 100 MCG tablet  Commonly known as: SYNTHROID     methocarbamoL 750 MG Tab  Commonly known as: ROBAXIN     multivitamin with minerals tablet     NovoLIN R FlexPen 100 unit/mL (3 mL) Inpn pen  Generic drug: insulin regular human  Inject 1-10 Units into the skin 3 (three) times daily as needed (Hyperglycemia).     ondansetron 8 MG tablet  Commonly known as: ZOFRAN            STOP taking these medications      carvediloL 12.5 MG tablet  Commonly known as: COREG     furosemide 20 MG tablet  Commonly known as: LASIX     lisinopriL 20 MG tablet  Commonly known as: PRINIVIL,ZESTRIL     mirtazapine 15 MG tablet  Commonly known as: REMERON     omeprazole 20 MG tablet  Commonly known as: PRILOSEC OTC               Where to Get Your Medications        These medications were sent to [id number]  [place] [address]  [address] [place] [address]      Phone: [phone]   HYDROcodone-acetaminophen 5-325 mg per tablet  pantoprazole 40 MG tablet  polyethylene glycol 17 gram Pwpk          Explained in detail to the patient about the discharge plan, medications, and follow-up visits. Pt understands and agrees with the treatment plan  Discharge Disposition: Home, patient moving to [place] tomorrow  Discharged Condition: stable  Diet-   Dietary Orders (From admission, onward)       Start     Ordered    07/18/25 1035  Diet Low Fiber/Residue Consistent Carbohydrate, Heart Healthy; 2000 Calories (up to 75 gm per meal); Standard Tray  Diet effective now        Question Answer Comment   Additional Diet Options: Consistent Carbohydrate    Additional Diet Options: Heart Healthy    Total calories / carbs: 2000 Calories (up to 75 gm per meal)    Tray type: Standard  Lorenzo        07/18/25 1034                   Medications Per DC med rec  Activities as tolerated   Follow-up Information       No, Primary Doctor Follow up in 1 week(s).    Why: with CBC and CMP labs             Cardiology Follow up in 1 week(s).    Why: here or in [place]             Luis Morton MD Follow up in 1 week(s).    Specialty: Gastroenterology  Why: here or in [place]  Contact information:  [facility] [address]  [facility] [address] [phone]                           For further questions contact hospitalist office    Discharge time 33 minutes    For worsening symptoms, chest pain, shortness of breath, increased abdominal pain, high grade fever, stroke or stroke like symptoms, immediately go to the nearest Emergency Room or call 911 as soon as possible.      Suresh Duckworth M.D, on 7/19/2025. at 2:24 PM.

## 2025-07-21 LAB — PSYCHE PATHOLOGY RESULT: NORMAL

## 2025-07-30 NOTE — ANESTHESIA POSTPROCEDURE EVALUATION
Anesthesia Post Evaluation    Patient: Gabriela Lozano    Procedure(s) Performed: Procedure(s) (LRB):  EGD (N/A)  EGD, WITH SUBMUCOSAL INJECTION  EGD, WITH CLOSED BIOPSY  EGD,WITH HEMORRHAGE CONTROL    Final Anesthesia Type: general      Patient location during evaluation: PACU  Patient participation: Yes- Able to Participate  Level of consciousness: awake and alert  Post-procedure vital signs: reviewed and stable  Pain management: adequate  Airway patency: patent      Anesthetic complications: no      Cardiovascular status: blood pressure returned to baseline  Respiratory status: unassisted  Hydration status: euvolemic  Follow-up not needed.              Vitals Value Taken Time   /82 07/17/25 10:34   Temp 36.2 °C (97.2 °F) 07/17/25 10:14   Pulse 79 07/17/25 10:34   Resp 12 07/17/25 10:34   SpO2 99 % 07/17/25 10:34         No case tracking events are documented in the log.      Pain/Mary Score: No data recorded

## (undated) DEVICE — STAPLER SKIN PROXIMATE WIDE

## (undated) DEVICE — GLOVE PROTEXIS LTX MICRO 8

## (undated) DEVICE — APPLICATOR CHLORAPREP ORN 26ML

## (undated) DEVICE — SOL IRRI STRL WATER 1000ML

## (undated) DEVICE — CONTAINER SPECIMEN SCREW 4OZ

## (undated) DEVICE — BIT DRILL 4.2MM 3 FLUTD 145MM

## (undated) DEVICE — TUBING O2 FEMALE CONN 13FT

## (undated) DEVICE — SUT CTD VICRYL CT-1 27

## (undated) DEVICE — IMPLANTABLE DEVICE
Type: IMPLANTABLE DEVICE | Site: FEMUR | Status: NON-FUNCTIONAL
Removed: 2024-11-27

## (undated) DEVICE — BANDAGE ACE DOUBLE STER 6IN

## (undated) DEVICE — COLLECTION SPECIMEN NEPTUNE

## (undated) DEVICE — GOWN SMARTSLEEVE AAMI LVL4 XL

## (undated) DEVICE — GLOVE SIGNATURE MICRO LTX 6.5

## (undated) DEVICE — KIT SURGICAL COLON .25 1.1OZ

## (undated) DEVICE — ELECTRODE REM POLYHESIVE II

## (undated) DEVICE — DRAPE C-ARMOR EQUIPMENT COVER

## (undated) DEVICE — BIT DRLL CALIB 110MM 2.8X200MM

## (undated) DEVICE — DRAPE STERI U-SHAPED 47X51IN

## (undated) DEVICE — GLOVE SENSICARE PI GRN 6.5

## (undated) DEVICE — GLOVE 8 PROTEXIS PI ORTHO

## (undated) DEVICE — BAG LABGUARD BIOHAZARD 6X9IN

## (undated) DEVICE — COVER FULLGUARD SHOE HIGH-TOP

## (undated) DEVICE — Device

## (undated) DEVICE — PAD CAST 6X4YD SPECIALISTIC

## (undated) DEVICE — CLIP RESOLUTION 360 ULT 235CM

## (undated) DEVICE — TIP SUCTION YANKAUER

## (undated) DEVICE — PADDDING CAST WEBRIL 4YDX3IN

## (undated) DEVICE — KIT CANIST SUCTION 1200CC

## (undated) DEVICE — BIT DRILL XLN 4.2MM

## (undated) DEVICE — SPONGE GAUZE 16PLY 4X4

## (undated) DEVICE — COVER SHOE FLUID RESISTANT XL

## (undated) DEVICE — DRAPE ORTH SPLIT 77X108IN

## (undated) DEVICE — TAPE SILK 3IN

## (undated) DEVICE — FORCEP ALLIGATOR 2.8MM W/NDL

## (undated) DEVICE — DRESSING XEROFORM 5X9IN

## (undated) DEVICE — DRESSING ADH ISLAND 2.5 X 3

## (undated) DEVICE — BLOCK BLOX BITE DENT RIM 54FR

## (undated) DEVICE — DRAPE TOP 53X102IN

## (undated) DEVICE — WIRE GUIDE 3.2MM 400MM
Type: IMPLANTABLE DEVICE | Site: FEMUR | Status: NON-FUNCTIONAL
Removed: 2024-11-27

## (undated) DEVICE — COVER TABLE HVY DTY 60X90IN